# Patient Record
Sex: FEMALE | Race: WHITE | NOT HISPANIC OR LATINO | Employment: FULL TIME | URBAN - METROPOLITAN AREA
[De-identification: names, ages, dates, MRNs, and addresses within clinical notes are randomized per-mention and may not be internally consistent; named-entity substitution may affect disease eponyms.]

---

## 2017-04-12 ENCOUNTER — TELEPHONE (OUTPATIENT)
Dept: ENDOCRINOLOGY | Facility: CLINIC | Age: 47
End: 2017-04-12

## 2017-04-12 DIAGNOSIS — E89.0 POSTOPERATIVE HYPOTHYROIDISM: ICD-10-CM

## 2017-04-12 RX ORDER — LEVOTHYROXINE SODIUM 137 UG/1
137 TABLET ORAL DAILY
Qty: 30 TABLET | Refills: 5 | Status: SHIPPED | OUTPATIENT
Start: 2017-04-12 | End: 2017-10-10 | Stop reason: SDUPTHER

## 2017-04-12 NOTE — TELEPHONE ENCOUNTER
----- Message from Kimberly CARNES MD sent at 4/11/2017  6:19 PM EDT -----  Please call the patient regarding her lab results. Thyroid function is at goal of therapy. Vitamin D is low and she needs replacement (is she taking vit D supplements?) She also needs appointment - 30 min in fall. If she waits till last minute we will not be able to find a spot.

## 2017-04-12 NOTE — TELEPHONE ENCOUNTER
Pt was informed of results and expressed understanding. An appointment has been scheduled for 10/2016

## 2017-09-25 ENCOUNTER — TELEPHONE (OUTPATIENT)
Dept: INTERNAL MEDICINE | Facility: CLINIC | Age: 47
End: 2017-09-25

## 2017-09-25 DIAGNOSIS — C73 PAPILLARY CARCINOMA OF THYROID (HCC): Primary | ICD-10-CM

## 2017-09-25 NOTE — TELEPHONE ENCOUNTER
PATIENT IS CONCERNED ABOUT NOT FEELING GOOD, LEG CRAMPS AND LOSS OF CONCENTRATION. SHE WANTS TO KNOW IF DR. OLIVAS WOULD WANT TO CHECK PATIENTS BLOOD LEVEL TO SEE IF THIS IS WHAT'S CAUSING HER ISSUES. YOU CAN REACH PATIENT BACK [phone]

## 2017-09-26 NOTE — TELEPHONE ENCOUNTER
Lab order was entered into pt's chart, called pt, no answer. Left message requesting for her to return my call.

## 2017-09-26 NOTE — TELEPHONE ENCOUNTER
Spoke with pt and informed her that  wants for her to have some labs completed and that she can come by our office to have these completed.

## 2017-10-09 ENCOUNTER — LAB (OUTPATIENT)
Dept: LAB | Facility: HOSPITAL | Age: 47
End: 2017-10-09

## 2017-10-09 DIAGNOSIS — C73 PAPILLARY CARCINOMA OF THYROID (HCC): ICD-10-CM

## 2017-10-09 LAB
T4 FREE SERPL-MCNC: 1.36 NG/DL (ref 0.89–1.76)
TSH SERPL DL<=0.05 MIU/L-ACNC: 0.53 MIU/ML (ref 0.35–5.35)

## 2017-10-09 PROCEDURE — 84439 ASSAY OF FREE THYROXINE: CPT | Performed by: INTERNAL MEDICINE

## 2017-10-09 PROCEDURE — 84443 ASSAY THYROID STIM HORMONE: CPT | Performed by: INTERNAL MEDICINE

## 2017-10-10 DIAGNOSIS — E89.0 POSTOPERATIVE HYPOTHYROIDISM: ICD-10-CM

## 2017-10-10 RX ORDER — LEVOTHYROXINE SODIUM 137 UG/1
137 TABLET ORAL DAILY
Qty: 30 TABLET | Refills: 5 | Status: SHIPPED | OUTPATIENT
Start: 2017-10-10 | End: 2018-02-15

## 2017-10-10 NOTE — TELEPHONE ENCOUNTER
HAS TO BE NAME BRAND, CALLED FOR REFILL, WANTS TO KNOW IF THERE WILL BE A DOSAGE CHANGE WITH RECENT LAB RESULTS

## 2018-02-13 ENCOUNTER — TRANSCRIBE ORDERS (OUTPATIENT)
Dept: ADMINISTRATIVE | Facility: HOSPITAL | Age: 48
End: 2018-02-13

## 2018-02-13 DIAGNOSIS — Z12.31 VISIT FOR SCREENING MAMMOGRAM: Primary | ICD-10-CM

## 2018-02-14 ENCOUNTER — OFFICE VISIT (OUTPATIENT)
Dept: INTERNAL MEDICINE | Facility: CLINIC | Age: 48
End: 2018-02-14

## 2018-02-14 VITALS
OXYGEN SATURATION: 99 % | SYSTOLIC BLOOD PRESSURE: 124 MMHG | WEIGHT: 167.8 LBS | HEIGHT: 66 IN | BODY MASS INDEX: 26.97 KG/M2 | TEMPERATURE: 98 F | HEART RATE: 74 BPM | DIASTOLIC BLOOD PRESSURE: 82 MMHG

## 2018-02-14 DIAGNOSIS — E53.8 VITAMIN B12 DEFICIENCY: ICD-10-CM

## 2018-02-14 DIAGNOSIS — Z78.0 MENOPAUSE: ICD-10-CM

## 2018-02-14 DIAGNOSIS — Z23 NEED FOR TDAP VACCINATION: ICD-10-CM

## 2018-02-14 DIAGNOSIS — E89.0 POSTOPERATIVE HYPOTHYROIDISM: ICD-10-CM

## 2018-02-14 DIAGNOSIS — E55.9 VITAMIN D DEFICIENCY: ICD-10-CM

## 2018-02-14 DIAGNOSIS — K21.9 GASTROESOPHAGEAL REFLUX DISEASE WITHOUT ESOPHAGITIS: ICD-10-CM

## 2018-02-14 DIAGNOSIS — M25.552 PAIN OF BOTH HIP JOINTS: ICD-10-CM

## 2018-02-14 DIAGNOSIS — M25.551 PAIN OF BOTH HIP JOINTS: ICD-10-CM

## 2018-02-14 DIAGNOSIS — Z00.00 ANNUAL PHYSICAL EXAM: Primary | ICD-10-CM

## 2018-02-14 DIAGNOSIS — R82.90 ABNORMAL URINALYSIS: ICD-10-CM

## 2018-02-14 LAB
BILIRUB BLD-MCNC: NEGATIVE MG/DL
CLARITY, POC: CLEAR
COLOR UR: YELLOW
GLUCOSE UR STRIP-MCNC: NEGATIVE MG/DL
KETONES UR QL: NEGATIVE
LEUKOCYTE EST, POC: ABNORMAL
NITRITE UR-MCNC: NEGATIVE MG/ML
PH UR: 6 [PH] (ref 5–8)
PROT UR STRIP-MCNC: NEGATIVE MG/DL
RBC # UR STRIP: NEGATIVE /UL
SP GR UR: 1.02 (ref 1–1.03)
UROBILINOGEN UR QL: NORMAL

## 2018-02-14 PROCEDURE — 90471 IMMUNIZATION ADMIN: CPT | Performed by: NURSE PRACTITIONER

## 2018-02-14 PROCEDURE — 81003 URINALYSIS AUTO W/O SCOPE: CPT | Performed by: NURSE PRACTITIONER

## 2018-02-14 PROCEDURE — 87086 URINE CULTURE/COLONY COUNT: CPT | Performed by: NURSE PRACTITIONER

## 2018-02-14 PROCEDURE — 99396 PREV VISIT EST AGE 40-64: CPT | Performed by: NURSE PRACTITIONER

## 2018-02-14 PROCEDURE — 90715 TDAP VACCINE 7 YRS/> IM: CPT | Performed by: NURSE PRACTITIONER

## 2018-02-14 NOTE — PROGRESS NOTES
Subjective   Joyce Orona is a 47 y.o. female here to establish care.  Chief Complaint   Patient presents with   • Establish Care   • Annual Exam       History of Present Illness     Joyce is here to establish care and complete her annual physical exam.  She does see Dr. Christensen for gynecology and Dr. Vance for endocrinology.     She does have a history of thyroid cancer in 2007. She underwent a thyroidectomy and I - 131 was administered afterwards.  In July 2008 she had repeat whole body scan and recurrent activity in the thyroid bed was seen, for which patient received additional therapy. Thyroglobulin is undetectable since then.  Her last visit with Dr. Vance was 11/2016. Has an appt with her 3/2/2018.   Her last TSH and free T4 were drawn 10/2017.  TSH was 0.528 and free T4 was 1.36 at that time. Currently compliant with taking levothyroxine 125mg daily. Has some tremor in the  afternoons occasionally, but feels better after a snack.     She would like me to take over monitoring her thyroid.      Having some mild arthralgias in her hips and legs. Feels stiff in the mornings and improves within a few minutes of ambulation or when she gets on her treadmill.    Her Vitamin D was 12.8 in 3/2017.  She is not currently taking vitamin D supplementation.      She has had some past issues with vertigo and has meclizine for this. Sh has not had any problems for about a year.     Menopause- her LMP was 11/2016.  She does have some occasional hot flashes/night sweats. Denies any mood or vaginal symptoms. She does not wish to take any medications for this at this time.     GERD- chronic and well controlled with prevacid 15mg daily. Denies heartburn, regurgitation, early satiety, cough, trouble swallowing, N/V, abdominal pain, and melena.    Health maintenance:    Influenza: 10/2017  Tdap: 2/14/2018  Pap: per GYN (torre)  Mammogram:she has one scheduled for 3/2018  Dexa: 2003  Tobacco use: denies    Diet: meat and  "potatoes  Exercise: daily walking on treadmill      The following portions of the patient's history were reviewed and updated as appropriate: allergies, current medications, past family history, past medical history, past social history, past surgical history and problem list.    Review of Systems   Constitutional: Negative for appetite change, chills, fatigue and fever.   HENT: Negative for congestion, ear pain, rhinorrhea, sinus pressure and sore throat.    Eyes: Negative for itching and visual disturbance.   Respiratory: Negative for cough, chest tightness, shortness of breath and wheezing.    Cardiovascular: Negative for chest pain, palpitations and leg swelling.   Gastrointestinal: Negative for abdominal pain, constipation, diarrhea, nausea and vomiting.   Endocrine: Negative for cold intolerance, heat intolerance, polydipsia, polyphagia and polyuria.   Genitourinary: Negative for difficulty urinating, dysuria and hematuria.        C/O Hot flashes/night sweats     Musculoskeletal: Negative for arthralgias, back pain, joint swelling and myalgias.   Skin: Negative for rash and wound.   Allergic/Immunologic: Negative for environmental allergies and food allergies.   Neurological: Positive for dizziness (none for the last year) and tremors (occasional in afternoons). Negative for headaches.   Hematological: Negative for adenopathy. Does not bruise/bleed easily.   Psychiatric/Behavioral: Negative for sleep disturbance. The patient is not nervous/anxious.      Blood pressure 124/82, pulse 74, temperature 98 °F (36.7 °C), height 166.4 cm (65.5\"), weight 76.1 kg (167 lb 12.8 oz), last menstrual period 11/01/2016, SpO2 99 %, not currently breastfeeding.    Allergies   Allergen Reactions   • Antihistamines, Chlorpheniramine-Type Palpitations     TABS.  Elevated heart rate     • Codeine Rash     Past Medical History:   Diagnosis Date   • GERD (gastroesophageal reflux disease)    • H/O bone density study 01/01/2007   • H/O " mammogram 02/01/2017   • Hx of radiation therapy    • Migraine    • Pap smear for cervical cancer screening 04/01/2017   • Thyroid cancer 05/2007    radioiodine tx      Past Surgical History:   Procedure Laterality Date   • TONSILLECTOMY     • TOTAL THYROIDECTOMY  05/2007    Dr. FALCON   • VAGINAL DELIVERY      x1     Family History   Problem Relation Age of Onset   • Diabetes type I Father    • Kidney failure Father 42   • Heart attack Maternal Grandmother 72   • Arthritis Maternal Grandmother    • Diabetes type I Daughter    • Hypertension Mother    • Arthritis Maternal Grandfather    • Arthritis Paternal Grandmother    • Arthritis Paternal Grandfather    • Heart attack Paternal Grandfather 72   • No Known Problems Brother      Social History     Social History   • Marital status:      Spouse name: N/A   • Number of children: N/A   • Years of education: N/A     Occupational History   • refer coord      Social History Main Topics   • Smoking status: Never Smoker   • Smokeless tobacco: Never Used   • Alcohol use No   • Drug use: No   • Sexual activity: Yes     Partners: Male     Birth control/ protection: None      Comment:      Other Topics Concern   • Not on file     Social History Narrative    Lives with spouse and daughter     Immunization History   Administered Date(s) Administered   • Influenza, Unspecified 10/01/2017   • Tdap 02/14/2018   • Tetanus 01/01/2005       Current Outpatient Prescriptions:   •  lansoprazole (PREVACID 24HR) 15 MG capsule, Take  by mouth every 12 (twelve) hours., Disp: , Rfl:   •  Multiple Vitamins-Minerals (HAIR SKIN NAILS PO), Take  by mouth., Disp: , Rfl:   •  levothyroxine (SYNTHROID) 125 MCG tablet, Take 1 tablet by mouth Daily., Disp: 30 tablet, Rfl: 3    Objective   Physical Exam   Constitutional: She is oriented to person, place, and time. Vital signs are normal. She appears well-developed and well-nourished. No distress.   HENT:   Head: Normocephalic and atraumatic.    Right Ear: Tympanic membrane and external ear normal.   Left Ear: Tympanic membrane and external ear normal.   Nose: Nose normal. Right sinus exhibits no maxillary sinus tenderness and no frontal sinus tenderness. Left sinus exhibits no maxillary sinus tenderness and no frontal sinus tenderness.   Mouth/Throat: Uvula is midline, oropharynx is clear and moist and mucous membranes are normal.   Eyes: Conjunctivae, EOM and lids are normal. Pupils are equal, round, and reactive to light. Right eye exhibits no discharge. Left eye exhibits no discharge. No scleral icterus.   Neck: Normal range of motion. Neck supple. No JVD present. Carotid bruit is not present. No tracheal deviation present. No thyroid mass (S/P thyroidectomy) present.   Cardiovascular: Normal rate, regular rhythm, normal heart sounds and intact distal pulses.  Exam reveals no gallop and no friction rub.    No murmur heard.  Pulmonary/Chest: Effort normal and breath sounds normal. No respiratory distress. She exhibits no tenderness. Right breast exhibits no inverted nipple, no mass, no nipple discharge, no skin change and no tenderness. Left breast exhibits no inverted nipple, no mass, no nipple discharge, no skin change and no tenderness. Breasts are symmetrical.   Abdominal: Soft. Normal appearance and bowel sounds are normal. She exhibits no distension and no mass. There is no hepatosplenomegaly. There is no tenderness. There is no rigidity, no rebound, no guarding and no CVA tenderness. No hernia.   Musculoskeletal: Normal range of motion. She exhibits no edema, tenderness or deformity.   Lymphadenopathy:        Head (right side): No submental, no submandibular, no tonsillar, no preauricular, no posterior auricular and no occipital adenopathy present.        Head (left side): No submental, no submandibular, no tonsillar, no preauricular, no posterior auricular and no occipital adenopathy present.     She has no cervical adenopathy.   Neurological:  She is alert and oriented to person, place, and time. She has normal strength and normal reflexes. She displays normal reflexes. No cranial nerve deficit or sensory deficit. Coordination and gait normal.   Skin: Skin is warm and dry. No rash noted. She is not diaphoretic. No erythema. No pallor.   Psychiatric: She has a normal mood and affect. Her speech is normal and behavior is normal. Thought content normal. She is attentive.   Nursing note and vitals reviewed.      Assessment/Plan   Joyce was seen today for establish care and annual exam.    Diagnoses and all orders for this visit:    Annual physical exam  -     POCT urinalysis dipstick, automated  -     CBC & Differential; Future  -     Comprehensive Metabolic Panel; Future  -     TSH; Future  -     T4, Free; Future  -     Lipid Panel; Future  -     Vitamin D 25 Hydroxy; Future  -     Vitamin B12; Future  -     CBC & Differential  -     Comprehensive Metabolic Panel  -     TSH  -     T4, Free  -     Lipid Panel  -     Vitamin D 25 Hydroxy  -     Vitamin B12  -     CBC Auto Differential    Need for Tdap vaccination  -     Tdap Vaccine Greater Than or Equal To 8yo IM    Abnormal urinalysis  -     Urine Culture - Urine, Urine, Clean Catch    Menopause  -     DEXA Bone Density Axial; Future    Vitamin D deficiency  -     Vitamin D 25 Hydroxy; Future  -     Vitamin D 25 Hydroxy    Vitamin B12 deficiency  -     Vitamin B12; Future  -     Vitamin B12    Postoperative hypothyroidism  -     levothyroxine (SYNTHROID) 125 MCG tablet; Take 1 tablet by mouth Daily.    Gastroesophageal reflux disease without esophagitis    Pain of both hip joints        Outpatient Encounter Prescriptions as of 2/14/2018   Medication Sig Dispense Refill   • lansoprazole (PREVACID 24HR) 15 MG capsule Take  by mouth every 12 (twelve) hours.     • Multiple Vitamins-Minerals (HAIR SKIN NAILS PO) Take  by mouth.     • [DISCONTINUED] levothyroxine (SYNTHROID, LEVOTHROID) 137 MCG tablet Take 1  tablet by mouth Daily. (Patient taking differently: Take 137 mcg by mouth Daily. SUE Brand only) 30 tablet 5   • levothyroxine (SYNTHROID) 125 MCG tablet Take 1 tablet by mouth Daily. 30 tablet 3   • [DISCONTINUED] azithromycin (ZITHROMAX) 250 MG tablet Take 2 tablets the first day, then 1 tablet daily for 4 days. 6 tablet 0     No facility-administered encounter medications on file as of 2/14/2018.      Urine + leuks, culture sent and was normal.   Labs sent and reviewed with pt.   Vit D low, rec vit D 3 4719-0980 units daily  Will decrease her levothyroxine to 125mcg daily as TSH low and free t4 up.   Health maintenance: DEXA ordered, Tdap updated today  Counseling: diet and exercise  Encouraged to stay active  She will follow up with Dr Vance 3/2/18. She will discuss having me follow her thyroid with Dr. Vance.   Return in about 1 year (around 2/14/2019). need repeat thyroid labs in 6-8 weeks due to dose change.    Plan of care discussed with pt. She verbalized understanding and agreement.

## 2018-02-15 LAB
25(OH)D3 SERPL-MCNC: 21.9 NG/ML
ALBUMIN SERPL-MCNC: 4.4 G/DL (ref 3.2–4.8)
ALBUMIN/GLOB SERPL: 1.7 G/DL (ref 1.5–2.5)
ALP SERPL-CCNC: 95 U/L (ref 25–100)
ALT SERPL W P-5'-P-CCNC: 23 U/L (ref 7–40)
ANION GAP SERPL CALCULATED.3IONS-SCNC: 8 MMOL/L (ref 3–11)
ARTICHOKE IGE QN: 99 MG/DL (ref 0–130)
AST SERPL-CCNC: 27 U/L (ref 0–33)
BASOPHILS # BLD AUTO: 0.02 10*3/MM3 (ref 0–0.2)
BASOPHILS NFR BLD AUTO: 0.3 % (ref 0–1)
BILIRUB SERPL-MCNC: 0.6 MG/DL (ref 0.3–1.2)
BUN BLD-MCNC: 15 MG/DL (ref 9–23)
BUN/CREAT SERPL: 18.8 (ref 7–25)
CALCIUM SPEC-SCNC: 9.6 MG/DL (ref 8.7–10.4)
CHLORIDE SERPL-SCNC: 104 MMOL/L (ref 99–109)
CHOLEST SERPL-MCNC: 160 MG/DL (ref 0–200)
CO2 SERPL-SCNC: 28 MMOL/L (ref 20–31)
CREAT BLD-MCNC: 0.8 MG/DL (ref 0.6–1.3)
DEPRECATED RDW RBC AUTO: 43.7 FL (ref 37–54)
EOSINOPHIL # BLD AUTO: 0.18 10*3/MM3 (ref 0–0.3)
EOSINOPHIL NFR BLD AUTO: 3 % (ref 0–3)
ERYTHROCYTE [DISTWIDTH] IN BLOOD BY AUTOMATED COUNT: 12.8 % (ref 11.3–14.5)
GFR SERPL CREATININE-BSD FRML MDRD: 76 ML/MIN/1.73
GLOBULIN UR ELPH-MCNC: 2.6 GM/DL
GLUCOSE BLD-MCNC: 80 MG/DL (ref 70–100)
HCT VFR BLD AUTO: 42 % (ref 34.5–44)
HDLC SERPL-MCNC: 51 MG/DL (ref 40–60)
HGB BLD-MCNC: 13.6 G/DL (ref 11.5–15.5)
IMM GRANULOCYTES # BLD: 0.01 10*3/MM3 (ref 0–0.03)
IMM GRANULOCYTES NFR BLD: 0.2 % (ref 0–0.6)
LYMPHOCYTES # BLD AUTO: 1.17 10*3/MM3 (ref 0.6–4.8)
LYMPHOCYTES NFR BLD AUTO: 19.6 % (ref 24–44)
MCH RBC QN AUTO: 30.2 PG (ref 27–31)
MCHC RBC AUTO-ENTMCNC: 32.4 G/DL (ref 32–36)
MCV RBC AUTO: 93.3 FL (ref 80–99)
MONOCYTES # BLD AUTO: 0.53 10*3/MM3 (ref 0–1)
MONOCYTES NFR BLD AUTO: 8.9 % (ref 0–12)
NEUTROPHILS # BLD AUTO: 4.06 10*3/MM3 (ref 1.5–8.3)
NEUTROPHILS NFR BLD AUTO: 68 % (ref 41–71)
PLATELET # BLD AUTO: 293 10*3/MM3 (ref 150–450)
PMV BLD AUTO: 9.4 FL (ref 6–12)
POTASSIUM BLD-SCNC: 4 MMOL/L (ref 3.5–5.5)
PROT SERPL-MCNC: 7 G/DL (ref 5.7–8.2)
RBC # BLD AUTO: 4.5 10*6/MM3 (ref 3.89–5.14)
SODIUM BLD-SCNC: 140 MMOL/L (ref 132–146)
T4 FREE SERPL-MCNC: 1.77 NG/DL (ref 0.89–1.76)
TRIGL SERPL-MCNC: 75 MG/DL (ref 0–150)
TSH SERPL DL<=0.05 MIU/L-ACNC: 0.05 MIU/ML (ref 0.35–5.35)
VIT B12 BLD-MCNC: 840 PG/ML (ref 211–911)
WBC NRBC COR # BLD: 5.97 10*3/MM3 (ref 3.5–10.8)

## 2018-02-15 PROCEDURE — 85025 COMPLETE CBC W/AUTO DIFF WBC: CPT | Performed by: NURSE PRACTITIONER

## 2018-02-15 PROCEDURE — 80053 COMPREHEN METABOLIC PANEL: CPT | Performed by: NURSE PRACTITIONER

## 2018-02-15 PROCEDURE — 84439 ASSAY OF FREE THYROXINE: CPT | Performed by: NURSE PRACTITIONER

## 2018-02-15 PROCEDURE — 80061 LIPID PANEL: CPT | Performed by: NURSE PRACTITIONER

## 2018-02-15 PROCEDURE — 84443 ASSAY THYROID STIM HORMONE: CPT | Performed by: NURSE PRACTITIONER

## 2018-02-15 PROCEDURE — 82607 VITAMIN B-12: CPT | Performed by: NURSE PRACTITIONER

## 2018-02-15 PROCEDURE — 82306 VITAMIN D 25 HYDROXY: CPT | Performed by: NURSE PRACTITIONER

## 2018-02-15 RX ORDER — LEVOTHYROXINE SODIUM 0.12 MG/1
125 TABLET ORAL DAILY
Qty: 30 TABLET | Refills: 3 | Status: SHIPPED | OUTPATIENT
Start: 2018-02-15 | End: 2018-06-11 | Stop reason: ALTCHOICE

## 2018-02-16 LAB
BACTERIA SPEC AEROBE CULT: NORMAL
BACTERIA SPEC AEROBE CULT: NORMAL

## 2018-02-17 PROBLEM — E55.9 VITAMIN D DEFICIENCY: Status: ACTIVE | Noted: 2018-02-17

## 2018-02-17 PROBLEM — Z78.0 MENOPAUSE: Status: ACTIVE | Noted: 2018-02-17

## 2018-03-02 ENCOUNTER — OFFICE VISIT (OUTPATIENT)
Dept: ENDOCRINOLOGY | Facility: CLINIC | Age: 48
End: 2018-03-02

## 2018-03-02 VITALS
BODY MASS INDEX: 27.26 KG/M2 | OXYGEN SATURATION: 98 % | DIASTOLIC BLOOD PRESSURE: 74 MMHG | HEIGHT: 66 IN | SYSTOLIC BLOOD PRESSURE: 130 MMHG | WEIGHT: 169.6 LBS | HEART RATE: 84 BPM

## 2018-03-02 DIAGNOSIS — E89.0 POSTOPERATIVE HYPOTHYROIDISM: ICD-10-CM

## 2018-03-02 DIAGNOSIS — C73 PAPILLARY CARCINOMA OF THYROID (HCC): Primary | ICD-10-CM

## 2018-03-02 PROCEDURE — 99213 OFFICE O/P EST LOW 20 MIN: CPT | Performed by: INTERNAL MEDICINE

## 2018-03-02 RX ORDER — MELATONIN
3000 DAILY
COMMUNITY
End: 2019-02-21

## 2018-03-02 NOTE — PROGRESS NOTES
"Chief complaint  Thyroid Cancer (F/u for thyroid cancer, pt stated she has been feeling well overall)    Subjective   Joyce Orona is a 47 y.o. female is here today for follow-up.  Follow-up for thyroid cancer.   S/p thyroidectomy 2007 and I - 131 was administered afterwards. She had lab work to follow the cancer and the work -up was negative.   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 T1 NxMx.  In July 2007 patient received 100.4 millicuries of radioactive iodine and uptake in the thyroid bed was seen on posttreatment scan. In July 2008 patient had repeat whole body scan and recurrent activity in the thyroid bed was seen, for which patient received additional therapy.  Thyroglobulin is undetectable since then and she remained on suppression therapy  Takes Synthroid 137 mcg and reduced the dose to 125 mcg 2 weeks ago (TSh was 0.04 and free T4 1.77) .     Vit D deficency was dx on labs and she was given supplements    C/o hot flushes and night sweats      Medications    Current Outpatient Prescriptions:   •  cholecalciferol (VITAMIN D3) 1000 units tablet, Take 3,000 Units by mouth Daily., Disp: , Rfl:   •  lansoprazole (PREVACID 24HR) 15 MG capsule, Take  by mouth every 12 (twelve) hours., Disp: , Rfl:   •  levothyroxine (SYNTHROID) 125 MCG tablet, Take 1 tablet by mouth Daily., Disp: 30 tablet, Rfl: 3  •  Multiple Vitamins-Minerals (HAIR SKIN NAILS PO), Take  by mouth., Disp: , Rfl:     PMH  The following portions of the patient's history were reviewed and updated as appropriate: allergies, current medications, past family history, past medical history, past social history, past surgical history and problem list.    Review of systems  Review of Systems   All other systems reviewed and are negative.      Physical exam  Objective   Blood pressure 130/74, pulse 84, height 166.4 cm (65.5\"), weight 76.9 kg (169 lb 9.6 oz), SpO2 98 " %, not currently breastfeeding.   Physical Exam   Constitutional: She is oriented to person, place, and time. She appears well-developed and well-nourished.   HENT:   Head: Normocephalic and atraumatic.   Eyes: Conjunctivae are normal.   Neck: No thyromegaly present.   The neck is supple and symmetric   Cardiovascular: Normal rate, regular rhythm and normal heart sounds.    Pulmonary/Chest: Effort normal and breath sounds normal.   Musculoskeletal: She exhibits no edema.   Lymphadenopathy:     She has no cervical adenopathy.   Neurological: She is alert and oriented to person, place, and time. She has normal reflexes.   Skin: Skin is warm and dry. No rash noted.   Psychiatric: She has a normal mood and affect. Thought content normal.   Vitals reviewed.        LABS AND IMAGING  Reviewed labs from recent visit with PCP        Assessment  Assessment/Plan   Problem List Items Addressed This Visit        Endocrine    Hypothyroidism    Papillary carcinoma of thyroid - Primary          Plan   Cont 125 mcg  Synthroid with the goal of TSH suppression. Continue with TG evaluation and repeat labs in 6-8 weeks.Labs before the next visit.   Agree with the dose reduction, most likely reduced requirements in the dose are associated with menopause.   Follow-up in 1 year.

## 2018-03-13 ENCOUNTER — APPOINTMENT (OUTPATIENT)
Dept: MAMMOGRAPHY | Facility: HOSPITAL | Age: 48
End: 2018-03-13
Attending: OBSTETRICS & GYNECOLOGY

## 2018-04-11 ENCOUNTER — APPOINTMENT (OUTPATIENT)
Dept: BONE DENSITY | Facility: HOSPITAL | Age: 48
End: 2018-04-11

## 2018-04-16 ENCOUNTER — HOSPITAL ENCOUNTER (OUTPATIENT)
Dept: MAMMOGRAPHY | Facility: HOSPITAL | Age: 48
Discharge: HOME | End: 2018-04-16
Attending: OBSTETRICS & GYNECOLOGY | Admitting: OBSTETRICS & GYNECOLOGY

## 2018-04-16 DIAGNOSIS — Z12.31 VISIT FOR SCREENING MAMMOGRAM: ICD-10-CM

## 2018-04-16 PROCEDURE — 77067 SCR MAMMO BI INCL CAD: CPT | Performed by: RADIOLOGY

## 2018-04-16 PROCEDURE — 77067 SCR MAMMO BI INCL CAD: CPT

## 2018-04-16 PROCEDURE — 77063 BREAST TOMOSYNTHESIS BI: CPT

## 2018-04-16 PROCEDURE — 77063 BREAST TOMOSYNTHESIS BI: CPT | Performed by: RADIOLOGY

## 2018-06-08 ENCOUNTER — LAB (OUTPATIENT)
Dept: INTERNAL MEDICINE | Facility: CLINIC | Age: 48
End: 2018-06-08

## 2018-06-08 DIAGNOSIS — C73 PAPILLARY CARCINOMA OF THYROID (HCC): ICD-10-CM

## 2018-06-08 LAB
T4 FREE SERPL-MCNC: 1.33 NG/DL (ref 0.89–1.76)
TSH SERPL DL<=0.05 MIU/L-ACNC: 0.17 MIU/ML (ref 0.35–5.35)

## 2018-06-08 PROCEDURE — 86800 THYROGLOBULIN ANTIBODY: CPT | Performed by: INTERNAL MEDICINE

## 2018-06-08 PROCEDURE — 84443 ASSAY THYROID STIM HORMONE: CPT | Performed by: INTERNAL MEDICINE

## 2018-06-08 PROCEDURE — 84439 ASSAY OF FREE THYROXINE: CPT | Performed by: INTERNAL MEDICINE

## 2018-06-11 ENCOUNTER — TELEPHONE (OUTPATIENT)
Dept: ENDOCRINOLOGY | Facility: CLINIC | Age: 48
End: 2018-06-11

## 2018-06-11 LAB — REF LAB TEST RESULTS: NORMAL

## 2018-06-11 RX ORDER — LEVOTHYROXINE SODIUM 125 MCG
125 TABLET ORAL DAILY
Qty: 30 TABLET | Refills: 5 | Status: SHIPPED | OUTPATIENT
Start: 2018-06-11 | End: 2018-12-06 | Stop reason: SDUPTHER

## 2018-06-11 NOTE — TELEPHONE ENCOUNTER
THyroid function is at goal of therapy, continue same dose. Send rx. Thyroglobulin is 0.1 with negative antibodies.

## 2018-06-11 NOTE — TELEPHONE ENCOUNTER
Pt said she is getting ready to leave out of town and is needing a refill on her levothyroxine. She had labs completed on Friday but did not see where you have reviewed her results yet to see if her her dosage needed to be changed?

## 2018-06-23 RX ORDER — LANSOPRAZOLE 30 MG/1
CAPSULE, DELAYED RELEASE ORAL
Qty: 90 CAPSULE | Refills: 2 | OUTPATIENT
Start: 2018-06-23

## 2018-06-27 ENCOUNTER — TELEPHONE (OUTPATIENT)
Dept: INTERNAL MEDICINE | Facility: CLINIC | Age: 48
End: 2018-06-27

## 2018-06-27 DIAGNOSIS — K21.9 GASTROESOPHAGEAL REFLUX DISEASE WITHOUT ESOPHAGITIS: Primary | ICD-10-CM

## 2018-06-27 RX ORDER — LANSOPRAZOLE 15 MG/1
15 CAPSULE, DELAYED RELEASE ORAL EVERY 12 HOURS
Qty: 90 CAPSULE | Refills: 1 | Status: SHIPPED | OUTPATIENT
Start: 2018-06-27 | End: 2018-09-22 | Stop reason: SDUPTHER

## 2018-08-06 ENCOUNTER — LAB (OUTPATIENT)
Dept: INTERNAL MEDICINE | Facility: CLINIC | Age: 48
End: 2018-08-06

## 2018-08-06 DIAGNOSIS — N95.0 POSTMENOPAUSAL BLEEDING: Primary | ICD-10-CM

## 2018-08-06 DIAGNOSIS — N95.0 POSTMENOPAUSAL BLEEDING: ICD-10-CM

## 2018-08-06 DIAGNOSIS — E89.0 POSTOPERATIVE HYPOTHYROIDISM: ICD-10-CM

## 2018-08-06 DIAGNOSIS — Z85.850 HISTORY OF THYROID CANCER: ICD-10-CM

## 2018-08-06 LAB
ALBUMIN SERPL-MCNC: 4.31 G/DL (ref 3.2–4.8)
ALBUMIN/GLOB SERPL: 1.5 G/DL (ref 1.5–2.5)
ALP SERPL-CCNC: 130 U/L (ref 25–100)
ALT SERPL W P-5'-P-CCNC: 16 U/L (ref 7–40)
ANION GAP SERPL CALCULATED.3IONS-SCNC: 9 MMOL/L (ref 3–11)
AST SERPL-CCNC: 22 U/L (ref 0–33)
BILIRUB SERPL-MCNC: 0.3 MG/DL (ref 0.3–1.2)
BUN BLD-MCNC: 16 MG/DL (ref 9–23)
BUN/CREAT SERPL: 21.3 (ref 7–25)
CALCIUM SPEC-SCNC: 9.3 MG/DL (ref 8.7–10.4)
CHLORIDE SERPL-SCNC: 108 MMOL/L (ref 99–109)
CO2 SERPL-SCNC: 26 MMOL/L (ref 20–31)
CREAT BLD-MCNC: 0.75 MG/DL (ref 0.6–1.3)
DEPRECATED RDW RBC AUTO: 47.2 FL (ref 37–54)
ERYTHROCYTE [DISTWIDTH] IN BLOOD BY AUTOMATED COUNT: 13.7 % (ref 11.3–14.5)
GFR SERPL CREATININE-BSD FRML MDRD: 82 ML/MIN/1.73
GLOBULIN UR ELPH-MCNC: 2.8 GM/DL
GLUCOSE BLD-MCNC: 85 MG/DL (ref 70–100)
HCT VFR BLD AUTO: 45.8 % (ref 34.5–44)
HGB BLD-MCNC: 14.6 G/DL (ref 11.5–15.5)
MCH RBC QN AUTO: 30 PG (ref 27–31)
MCHC RBC AUTO-ENTMCNC: 31.9 G/DL (ref 32–36)
MCV RBC AUTO: 94 FL (ref 80–99)
PLATELET # BLD AUTO: 340 10*3/MM3 (ref 150–450)
PMV BLD AUTO: 9.6 FL (ref 6–12)
POTASSIUM BLD-SCNC: 4 MMOL/L (ref 3.5–5.5)
PROT SERPL-MCNC: 7.1 G/DL (ref 5.7–8.2)
RBC # BLD AUTO: 4.87 10*6/MM3 (ref 3.89–5.14)
SODIUM BLD-SCNC: 143 MMOL/L (ref 132–146)
TSH SERPL DL<=0.05 MIU/L-ACNC: 0.2 MIU/ML (ref 0.35–5.35)
WBC NRBC COR # BLD: 6.47 10*3/MM3 (ref 3.5–10.8)

## 2018-08-06 PROCEDURE — 85027 COMPLETE CBC AUTOMATED: CPT | Performed by: NURSE PRACTITIONER

## 2018-08-06 PROCEDURE — 84439 ASSAY OF FREE THYROXINE: CPT | Performed by: NURSE PRACTITIONER

## 2018-08-06 PROCEDURE — 84443 ASSAY THYROID STIM HORMONE: CPT | Performed by: NURSE PRACTITIONER

## 2018-08-06 PROCEDURE — 80053 COMPREHEN METABOLIC PANEL: CPT | Performed by: NURSE PRACTITIONER

## 2018-08-06 NOTE — PROGRESS NOTES
Joyce notified me of vaginal bleeding similar to a heavy menses. She has not had a menses in almost 2 years. Will proceed with labs and US. May need to see GYN.

## 2018-08-07 LAB — T4 FREE SERPL-MCNC: 1.4 NG/DL (ref 0.89–1.76)

## 2018-08-16 DIAGNOSIS — Z78.0 MENOPAUSE: Primary | ICD-10-CM

## 2018-08-28 ENCOUNTER — OFFICE VISIT (OUTPATIENT)
Dept: INTERNAL MEDICINE | Facility: CLINIC | Age: 48
End: 2018-08-28

## 2018-08-28 VITALS
DIASTOLIC BLOOD PRESSURE: 64 MMHG | TEMPERATURE: 99.3 F | OXYGEN SATURATION: 98 % | HEART RATE: 84 BPM | SYSTOLIC BLOOD PRESSURE: 118 MMHG | HEIGHT: 66 IN

## 2018-08-28 DIAGNOSIS — R06.2 WHEEZING: Primary | ICD-10-CM

## 2018-08-28 DIAGNOSIS — J01.10 ACUTE FRONTAL SINUSITIS, RECURRENCE NOT SPECIFIED: ICD-10-CM

## 2018-08-28 DIAGNOSIS — J40 BRONCHITIS: ICD-10-CM

## 2018-08-28 PROCEDURE — 99214 OFFICE O/P EST MOD 30 MIN: CPT | Performed by: NURSE PRACTITIONER

## 2018-08-28 PROCEDURE — 94640 AIRWAY INHALATION TREATMENT: CPT | Performed by: NURSE PRACTITIONER

## 2018-08-28 RX ORDER — ALBUTEROL SULFATE 2.5 MG/3ML
2.5 SOLUTION RESPIRATORY (INHALATION) ONCE
Status: COMPLETED | OUTPATIENT
Start: 2018-08-28 | End: 2018-08-28

## 2018-08-28 RX ORDER — ALBUTEROL SULFATE 90 UG/1
2 AEROSOL, METERED RESPIRATORY (INHALATION) EVERY 4 HOURS PRN
Qty: 1 INHALER | Refills: 0 | Status: SHIPPED | OUTPATIENT
Start: 2018-08-28 | End: 2020-06-03 | Stop reason: SDUPTHER

## 2018-08-28 RX ORDER — DOXYCYCLINE HYCLATE 100 MG/1
100 CAPSULE ORAL 2 TIMES DAILY
Qty: 10 CAPSULE | Refills: 0 | Status: SHIPPED | OUTPATIENT
Start: 2018-08-28 | End: 2019-02-21

## 2018-08-28 RX ADMIN — ALBUTEROL SULFATE 2.5 MG: 2.5 SOLUTION RESPIRATORY (INHALATION) at 14:50

## 2018-08-28 NOTE — PROGRESS NOTES
Subjective   Joyce Orona is a 48 y.o. female.   Chief Complaint   Patient presents with   • Wheezing     sx x 2 days   • Facial Pain   • nasal drainage     colored mucus   • Headache       Wheezing    This is a new problem. The current episode started in the past 7 days. Associated symptoms include coryza, coughing, ear pain, headaches, rhinorrhea, shortness of breath and sputum production. Pertinent negatives include no abdominal pain, chest pain, chills, diarrhea, fever, hemoptysis, neck pain, rash, sore throat, swollen glands or vomiting. Nothing aggravates the symptoms. Treatments tried: claritin. The treatment provided no relief. There is no history of asthma, bronchiolitis, CAD, chronic lung disease, COPD, DVT, heart failure, past MI, PE or pneumonia.   Headache    This is a new problem. The current episode started in the past 7 days. The problem occurs daily. The problem has been unchanged. The pain is located in the bilateral and frontal region. The pain quality is similar to prior headaches. The quality of the pain is described as aching (pressure). The pain is mild. Associated symptoms include coughing, drainage, ear pain, rhinorrhea and sinus pressure. Pertinent negatives include no abdominal pain, abnormal behavior, anorexia, blurred vision, dizziness, eye pain, eye redness, facial sweating, fever, loss of balance, nausea, neck pain, numbness, phonophobia, scalp tenderness, seizures, sore throat, swollen glands, tingling or vomiting. The symptoms are aggravated by coughing. Treatments tried: claritin. The treatment provided mild relief. There is no history of cancer, cluster headaches, hypertension, immunosuppression, migraine headaches, migraines in the family, obesity, pseudotumor cerebri, recent head traumas, sinus disease or TMJ.   URI    This is a new problem. The current episode started in the past 7 days. The problem has been gradually worsening. There has been no fever. Associated  symptoms include congestion, coughing, ear pain, headaches, rhinorrhea, sinus pain and wheezing. Pertinent negatives include no abdominal pain, chest pain, diarrhea, dysuria, joint pain, joint swelling, nausea, neck pain, plugged ear sensation, rash, sneezing, sore throat, swollen glands or vomiting. She has tried antihistamine for the symptoms. The treatment provided mild relief.        The following portions of the patient's history were reviewed and updated as appropriate: allergies, current medications, past family history, past medical history, past social history, past surgical history and problem list.    Review of Systems   Constitutional: Positive for fatigue. Negative for appetite change, chills, diaphoresis and fever.   HENT: Positive for congestion, ear pain, postnasal drip, rhinorrhea, sinus pain and sinus pressure. Negative for sneezing and sore throat.    Eyes: Negative for blurred vision, pain, discharge, redness, itching and visual disturbance.   Respiratory: Positive for cough, sputum production, shortness of breath and wheezing. Negative for apnea, hemoptysis, choking, chest tightness and stridor.    Cardiovascular: Negative for chest pain and leg swelling.   Gastrointestinal: Negative for abdominal pain, anorexia, diarrhea, nausea and vomiting.   Genitourinary: Negative for dysuria.   Musculoskeletal: Negative for joint pain and neck pain.   Skin: Negative for rash.   Neurological: Positive for headaches. Negative for dizziness, tingling, seizures, numbness and loss of balance.       Objective   Physical Exam   Constitutional: She is oriented to person, place, and time. She appears well-developed and well-nourished. She does not have a sickly appearance. She appears ill. No distress.   HENT:   Head: Normocephalic and atraumatic.   Right Ear: Tympanic membrane and external ear normal.   Left Ear: Tympanic membrane and external ear normal.   Nose: Mucosal edema and rhinorrhea present. Right sinus  exhibits frontal sinus tenderness. Right sinus exhibits no maxillary sinus tenderness. Left sinus exhibits frontal sinus tenderness. Left sinus exhibits no maxillary sinus tenderness.   Mouth/Throat: Uvula is midline, oropharynx is clear and moist and mucous membranes are normal. No oropharyngeal exudate.   Eyes: Conjunctivae are normal. Right eye exhibits no discharge. Left eye exhibits no discharge.   Neck: Normal range of motion. Neck supple. No JVD present.   Cardiovascular: Normal rate, regular rhythm and normal heart sounds.    No murmur heard.  Pulmonary/Chest: Effort normal. No stridor. No respiratory distress. She has wheezes (cleared after neb in office).   Abdominal: Soft.   Musculoskeletal: She exhibits no edema.   Lymphadenopathy:     She has no cervical adenopathy.   Neurological: She is alert and oriented to person, place, and time.   Skin: Skin is warm and dry. Capillary refill takes less than 2 seconds. She is not diaphoretic.   Psychiatric: She has a normal mood and affect. Her behavior is normal.   Nursing note and vitals reviewed.      Assessment/Plan   Joyce was seen today for wheezing, facial pain, nasal drainage and headache.    Diagnoses and all orders for this visit:    Wheezing  -     albuterol (PROVENTIL) nebulizer solution 0.083% 2.5 mg/3mL; Take 2.5 mg by nebulization 1 (One) Time.  -     albuterol (PROVENTIL HFA;VENTOLIN HFA) 108 (90 Base) MCG/ACT inhaler; Inhale 2 puffs Every 4 (Four) Hours As Needed for Wheezing.    Acute frontal sinusitis, recurrence not specified  -     doxycycline (VIBRAMYCIN) 100 MG capsule; Take 1 capsule by mouth 2 (Two) Times a Day.    Bronchitis  -     albuterol (PROVENTIL HFA;VENTOLIN HFA) 108 (90 Base) MCG/ACT inhaler; Inhale 2 puffs Every 4 (Four) Hours As Needed for Wheezing.        Neb in office- tolerated well, expresses ability to take a deeper breath post neb.   Doxycycline as directed. AE discussed  Increase fluids  Saline nasal rinses.   Mucinex  with increased fluids  Albuterol inhaler PRN  May consider adding steroid if no improvement in 24 hours - she will let me know how she is feeling.   Return if symptoms worsen or fail to improve.  Plan of care discussed with pt. They verbalized understanding and agreement.

## 2018-09-13 ENCOUNTER — CLINICAL SUPPORT (OUTPATIENT)
Dept: INTERNAL MEDICINE | Facility: CLINIC | Age: 48
End: 2018-09-13

## 2018-09-13 DIAGNOSIS — Z23 NEED FOR PROPHYLACTIC VACCINATION AGAINST HEPATITIS A: Primary | ICD-10-CM

## 2018-09-13 PROCEDURE — 90632 HEPA VACCINE ADULT IM: CPT | Performed by: NURSE PRACTITIONER

## 2018-09-13 PROCEDURE — 90471 IMMUNIZATION ADMIN: CPT | Performed by: NURSE PRACTITIONER

## 2018-09-17 ENCOUNTER — HOSPITAL ENCOUNTER (OUTPATIENT)
Dept: BONE DENSITY | Facility: HOSPITAL | Age: 48
Discharge: HOME | End: 2018-09-17
Admitting: NURSE PRACTITIONER

## 2018-09-17 DIAGNOSIS — Z78.0 MENOPAUSE: ICD-10-CM

## 2018-09-17 PROCEDURE — 77080 DXA BONE DENSITY AXIAL: CPT

## 2018-09-24 RX ORDER — LANSOPRAZOLE 15 MG/1
CAPSULE, DELAYED RELEASE ORAL
Qty: 60 CAPSULE | Refills: 0 | Status: SHIPPED | OUTPATIENT
Start: 2018-09-24 | End: 2018-10-21 | Stop reason: SDUPTHER

## 2018-10-22 RX ORDER — LANSOPRAZOLE 15 MG/1
CAPSULE, DELAYED RELEASE ORAL
Qty: 60 CAPSULE | Refills: 5 | Status: SHIPPED | OUTPATIENT
Start: 2018-10-22 | End: 2019-02-21

## 2018-11-14 ENCOUNTER — TELEPHONE (OUTPATIENT)
Dept: INTERNAL MEDICINE | Facility: CLINIC | Age: 48
End: 2018-11-14

## 2018-11-14 DIAGNOSIS — E55.9 VITAMIN D DEFICIENCY: Primary | ICD-10-CM

## 2018-11-14 RX ORDER — LANSOPRAZOLE 30 MG/1
30 CAPSULE, DELAYED RELEASE ORAL DAILY
Qty: 90 CAPSULE | Refills: 1 | Status: SHIPPED | OUTPATIENT
Start: 2018-11-14 | End: 2019-05-16 | Stop reason: SDUPTHER

## 2018-11-14 NOTE — TELEPHONE ENCOUNTER
May I please have refills called in on my Prevacid and I take 2 of the 15mg can we please call in 30 mg so I am not taking two pills. Thank you

## 2018-12-06 RX ORDER — LEVOTHYROXINE SODIUM 125 MCG
TABLET ORAL
Qty: 30 TABLET | Refills: 4 | Status: SHIPPED | OUTPATIENT
Start: 2018-12-06 | End: 2019-07-02 | Stop reason: SDUPTHER

## 2018-12-10 ENCOUNTER — LAB (OUTPATIENT)
Dept: INTERNAL MEDICINE | Facility: CLINIC | Age: 48
End: 2018-12-10

## 2018-12-10 DIAGNOSIS — E55.9 VITAMIN D DEFICIENCY: ICD-10-CM

## 2018-12-10 DIAGNOSIS — E89.0 POSTOPERATIVE HYPOTHYROIDISM: Primary | ICD-10-CM

## 2018-12-10 DIAGNOSIS — E89.0 POSTOPERATIVE HYPOTHYROIDISM: ICD-10-CM

## 2018-12-10 LAB
25(OH)D3 SERPL-MCNC: 43.5 NG/ML
T4 FREE SERPL-MCNC: 1.74 NG/DL (ref 0.89–1.76)
TSH SERPL DL<=0.05 MIU/L-ACNC: 0.1 MIU/ML (ref 0.35–5.35)

## 2018-12-10 PROCEDURE — 84443 ASSAY THYROID STIM HORMONE: CPT | Performed by: NURSE PRACTITIONER

## 2018-12-10 PROCEDURE — 84439 ASSAY OF FREE THYROXINE: CPT | Performed by: NURSE PRACTITIONER

## 2018-12-10 PROCEDURE — 82306 VITAMIN D 25 HYDROXY: CPT | Performed by: NURSE PRACTITIONER

## 2018-12-22 ENCOUNTER — TELEPHONE (OUTPATIENT)
Dept: INTERNAL MEDICINE | Facility: CLINIC | Age: 48
End: 2018-12-22

## 2018-12-22 RX ORDER — AZITHROMYCIN 250 MG/1
TABLET, FILM COATED ORAL
Qty: 6 TABLET | Refills: 0 | Status: SHIPPED | OUTPATIENT
Start: 2018-12-22 | End: 2019-02-21

## 2018-12-22 NOTE — TELEPHONE ENCOUNTER
Requests Zpak, for sinus infection. Has used it before with good results.  Sent to [address]

## 2019-02-21 ENCOUNTER — OFFICE VISIT (OUTPATIENT)
Dept: INTERNAL MEDICINE | Facility: CLINIC | Age: 49
End: 2019-02-21

## 2019-02-21 VITALS
WEIGHT: 163 LBS | TEMPERATURE: 98.8 F | HEART RATE: 87 BPM | SYSTOLIC BLOOD PRESSURE: 118 MMHG | DIASTOLIC BLOOD PRESSURE: 70 MMHG | BODY MASS INDEX: 26.2 KG/M2 | HEIGHT: 66 IN | OXYGEN SATURATION: 98 % | RESPIRATION RATE: 16 BRPM

## 2019-02-21 DIAGNOSIS — R68.89 FLU-LIKE SYMPTOMS: Primary | ICD-10-CM

## 2019-02-21 DIAGNOSIS — H65.111 ACUTE MUCOID OTITIS MEDIA OF RIGHT EAR: ICD-10-CM

## 2019-02-21 LAB
EXPIRATION DATE: NORMAL
FLUAV AG NPH QL: NEGATIVE
FLUBV AG NPH QL: NEGATIVE
INTERNAL CONTROL: NORMAL
Lab: NORMAL

## 2019-02-21 PROCEDURE — 87804 INFLUENZA ASSAY W/OPTIC: CPT | Performed by: NURSE PRACTITIONER

## 2019-02-21 PROCEDURE — 99213 OFFICE O/P EST LOW 20 MIN: CPT | Performed by: NURSE PRACTITIONER

## 2019-02-21 RX ORDER — AMOXICILLIN 875 MG/1
875 TABLET, COATED ORAL 2 TIMES DAILY
Qty: 20 TABLET | Refills: 0 | Status: SHIPPED | OUTPATIENT
Start: 2019-02-21 | End: 2019-03-13

## 2019-02-21 NOTE — PROGRESS NOTES
Subjective   Joyce Orona is a 48 y.o. female.     Chief Complaint   Patient presents with   • Earache     nasal congestion and drainage, earache, body aches, chills last night, fatigue   • Generalized Body Aches   • Nasal Congestion       URI    This is a new problem. The current episode started in the past 7 days. The problem has been gradually worsening. There has been no fever. Associated symptoms include congestion (nasal), ear pain, headaches and rhinorrhea. Pertinent negatives include no abdominal pain, chest pain, coughing, diarrhea, dysuria, joint pain, joint swelling, nausea, neck pain, plugged ear sensation, rash, sinus pain, sneezing, sore throat, swollen glands, vomiting or wheezing. Treatments tried: claritin, stahist. flonase. The treatment provided no relief.   Earache    There is pain in both ears. This is a new problem. The current episode started in the past 7 days. The problem occurs constantly. The problem has been unchanged. There has been no fever. The pain is moderate. Associated symptoms include headaches and rhinorrhea. Pertinent negatives include no abdominal pain, coughing, diarrhea, ear discharge, hearing loss, neck pain, rash, sore throat or vomiting. Treatments tried: stahist.          Review of Systems   Constitutional: Positive for chills and fatigue. Negative for fever.   HENT: Positive for congestion (nasal), ear pain and rhinorrhea. Negative for ear discharge, hearing loss, sinus pain, sneezing and sore throat.    Respiratory: Negative for cough and wheezing.    Cardiovascular: Negative for chest pain.   Gastrointestinal: Negative for abdominal pain, diarrhea, nausea and vomiting.   Genitourinary: Negative for dysuria.   Musculoskeletal: Positive for myalgias. Negative for joint pain and neck pain.   Skin: Negative for rash.   Neurological: Positive for headaches.   Psychiatric/Behavioral: Positive for sleep disturbance.         Objective   Physical Exam   Constitutional:  She appears well-developed and well-nourished.  Non-toxic appearance. She appears ill. No distress.   HENT:   Head: Normocephalic and atraumatic.   Right Ear: Hearing, external ear and ear canal normal. No swelling. Tympanic membrane is injected, scarred, erythematous and bulging. Tympanic membrane mobility is abnormal. A middle ear effusion is present.   Left Ear: Hearing, external ear and ear canal normal. No swelling. Tympanic membrane is not injected and not erythematous. Tympanic membrane mobility is normal. A middle ear effusion is present.   Nose: Mucosal edema and rhinorrhea present. Right sinus exhibits no maxillary sinus tenderness and no frontal sinus tenderness. Left sinus exhibits no maxillary sinus tenderness and no frontal sinus tenderness.   Mouth/Throat: Uvula is midline, oropharynx is clear and moist and mucous membranes are normal. No oropharyngeal exudate, posterior oropharyngeal edema or posterior oropharyngeal erythema. No tonsillar exudate.   Eyes: Conjunctivae are normal. Pupils are equal, round, and reactive to light. Right eye exhibits no discharge. Left eye exhibits no discharge.   Neck: Normal range of motion. Neck supple.   Cardiovascular: Normal rate, regular rhythm and normal heart sounds.   Pulmonary/Chest: Effort normal and breath sounds normal. No respiratory distress.   Lymphadenopathy:     She has no cervical adenopathy.   Skin: Skin is warm and dry. No rash noted. She is not diaphoretic.   Nursing note and vitals reviewed.      Vitals:    02/21/19 1353   BP: 118/70   Pulse: 87   Resp: 16   Temp: 98.8 °F (37.1 °C)   SpO2: 98%         Assessment/Plan   Joyce was seen today for earache, generalized body aches and nasal congestion.    Diagnoses and all orders for this visit:    Flu-like symptoms  -     POCT Influenza A/B    Acute mucoid otitis media of right ear  -     amoxicillin (AMOXIL) 875 MG tablet; Take 1 tablet by mouth 2 (Two) Times a Day.         Flu  negative.  Amoxicillin as directed.  Increase rest and fluids.  May continue Claritin and Flonase.  Tylenol or Motrin over-the-counter as needed pain or fever.  Return if symptoms worsen or fail to improve.  Plan of care discussed with pt. They verbalized understanding and agreement.

## 2019-03-13 ENCOUNTER — OFFICE VISIT (OUTPATIENT)
Dept: INTERNAL MEDICINE | Facility: CLINIC | Age: 49
End: 2019-03-13

## 2019-03-13 VITALS
HEIGHT: 66 IN | SYSTOLIC BLOOD PRESSURE: 114 MMHG | HEART RATE: 81 BPM | DIASTOLIC BLOOD PRESSURE: 70 MMHG | RESPIRATION RATE: 16 BRPM | OXYGEN SATURATION: 98 % | BODY MASS INDEX: 26.44 KG/M2 | WEIGHT: 164.5 LBS | TEMPERATURE: 98.3 F

## 2019-03-13 DIAGNOSIS — C73 PAPILLARY CARCINOMA OF THYROID (HCC): ICD-10-CM

## 2019-03-13 DIAGNOSIS — E53.8 B12 DEFICIENCY: ICD-10-CM

## 2019-03-13 DIAGNOSIS — Z13.1 DIABETES MELLITUS SCREENING: ICD-10-CM

## 2019-03-13 DIAGNOSIS — K21.9 GASTROESOPHAGEAL REFLUX DISEASE WITHOUT ESOPHAGITIS: ICD-10-CM

## 2019-03-13 DIAGNOSIS — E89.0 POSTOPERATIVE HYPOTHYROIDISM: ICD-10-CM

## 2019-03-13 DIAGNOSIS — Z12.11 COLON CANCER SCREENING: ICD-10-CM

## 2019-03-13 DIAGNOSIS — Z00.00 ANNUAL PHYSICAL EXAM: Primary | ICD-10-CM

## 2019-03-13 DIAGNOSIS — R07.89 CHEST TIGHTNESS: ICD-10-CM

## 2019-03-13 DIAGNOSIS — Z13.220 LIPID SCREENING: ICD-10-CM

## 2019-03-13 DIAGNOSIS — R42 DIZZINESS: ICD-10-CM

## 2019-03-13 DIAGNOSIS — Z78.0 MENOPAUSE: ICD-10-CM

## 2019-03-13 DIAGNOSIS — E55.9 VITAMIN D DEFICIENCY: ICD-10-CM

## 2019-03-13 DIAGNOSIS — Z23 NEED FOR VACCINATION: ICD-10-CM

## 2019-03-13 LAB
25(OH)D3 SERPL-MCNC: 29.8 NG/ML
ALBUMIN SERPL-MCNC: 4.55 G/DL (ref 3.2–4.8)
ALBUMIN/GLOB SERPL: 1.9 G/DL (ref 1.5–2.5)
ALP SERPL-CCNC: 133 U/L (ref 25–100)
ALT SERPL W P-5'-P-CCNC: 23 U/L (ref 7–40)
ANION GAP SERPL CALCULATED.3IONS-SCNC: 10 MMOL/L (ref 3–11)
ARTICHOKE IGE QN: 102 MG/DL (ref 0–130)
AST SERPL-CCNC: 25 U/L (ref 0–33)
BILIRUB BLD-MCNC: NEGATIVE MG/DL
BILIRUB SERPL-MCNC: 0.5 MG/DL (ref 0.3–1.2)
BUN BLD-MCNC: 18 MG/DL (ref 9–23)
BUN/CREAT SERPL: 24 (ref 7–25)
CALCIUM SPEC-SCNC: 9.6 MG/DL (ref 8.7–10.4)
CANCER AG125 SERPL QL: 5.6 U/ML (ref 0–30.2)
CHLORIDE SERPL-SCNC: 106 MMOL/L (ref 99–109)
CHOLEST SERPL-MCNC: 160 MG/DL (ref 0–200)
CLARITY, POC: CLEAR
CO2 SERPL-SCNC: 27 MMOL/L (ref 20–31)
COLOR UR: YELLOW
CREAT BLD-MCNC: 0.75 MG/DL (ref 0.6–1.3)
DEPRECATED RDW RBC AUTO: 45.2 FL (ref 37–54)
ERYTHROCYTE [DISTWIDTH] IN BLOOD BY AUTOMATED COUNT: 13.2 % (ref 11.3–14.5)
GFR SERPL CREATININE-BSD FRML MDRD: 82 ML/MIN/1.73
GLOBULIN UR ELPH-MCNC: 2.5 GM/DL
GLUCOSE BLD-MCNC: 85 MG/DL (ref 70–100)
GLUCOSE UR STRIP-MCNC: NEGATIVE MG/DL
HBA1C MFR BLD: 5.3 % (ref 4.8–5.6)
HCT VFR BLD AUTO: 46 % (ref 34.5–44)
HDLC SERPL-MCNC: 46 MG/DL (ref 40–60)
HGB BLD-MCNC: 14.9 G/DL (ref 11.5–15.5)
KETONES UR QL: NEGATIVE
LEUKOCYTE EST, POC: ABNORMAL
MCH RBC QN AUTO: 30.3 PG (ref 27–31)
MCHC RBC AUTO-ENTMCNC: 32.4 G/DL (ref 32–36)
MCV RBC AUTO: 93.5 FL (ref 80–99)
NITRITE UR-MCNC: NEGATIVE MG/ML
PH UR: 5.5 [PH] (ref 5–8)
PLATELET # BLD AUTO: 333 10*3/MM3 (ref 150–450)
PMV BLD AUTO: 9.8 FL (ref 6–12)
POTASSIUM BLD-SCNC: 3.8 MMOL/L (ref 3.5–5.5)
PROT SERPL-MCNC: 7 G/DL (ref 5.7–8.2)
PROT UR STRIP-MCNC: NEGATIVE MG/DL
RBC # BLD AUTO: 4.92 10*6/MM3 (ref 3.89–5.14)
RBC # UR STRIP: NEGATIVE /UL
SODIUM BLD-SCNC: 143 MMOL/L (ref 132–146)
SP GR UR: 1.03 (ref 1–1.03)
TRIGL SERPL-MCNC: 118 MG/DL (ref 0–150)
TSH SERPL DL<=0.05 MIU/L-ACNC: 0.1 MIU/ML (ref 0.35–5.35)
UROBILINOGEN UR QL: NORMAL
VIT B12 BLD-MCNC: 1008 PG/ML (ref 211–911)
WBC NRBC COR # BLD: 5.38 10*3/MM3 (ref 3.5–10.8)

## 2019-03-13 PROCEDURE — 93000 ELECTROCARDIOGRAM COMPLETE: CPT | Performed by: NURSE PRACTITIONER

## 2019-03-13 PROCEDURE — 84443 ASSAY THYROID STIM HORMONE: CPT | Performed by: NURSE PRACTITIONER

## 2019-03-13 PROCEDURE — 85027 COMPLETE CBC AUTOMATED: CPT | Performed by: NURSE PRACTITIONER

## 2019-03-13 PROCEDURE — 82306 VITAMIN D 25 HYDROXY: CPT | Performed by: NURSE PRACTITIONER

## 2019-03-13 PROCEDURE — 99213 OFFICE O/P EST LOW 20 MIN: CPT | Performed by: NURSE PRACTITIONER

## 2019-03-13 PROCEDURE — 86304 IMMUNOASSAY TUMOR CA 125: CPT | Performed by: NURSE PRACTITIONER

## 2019-03-13 PROCEDURE — 90632 HEPA VACCINE ADULT IM: CPT | Performed by: NURSE PRACTITIONER

## 2019-03-13 PROCEDURE — 99396 PREV VISIT EST AGE 40-64: CPT | Performed by: NURSE PRACTITIONER

## 2019-03-13 PROCEDURE — 90471 IMMUNIZATION ADMIN: CPT | Performed by: NURSE PRACTITIONER

## 2019-03-13 PROCEDURE — 82607 VITAMIN B-12: CPT | Performed by: NURSE PRACTITIONER

## 2019-03-13 PROCEDURE — 80053 COMPREHEN METABOLIC PANEL: CPT | Performed by: NURSE PRACTITIONER

## 2019-03-13 PROCEDURE — 83036 HEMOGLOBIN GLYCOSYLATED A1C: CPT | Performed by: NURSE PRACTITIONER

## 2019-03-13 PROCEDURE — 81003 URINALYSIS AUTO W/O SCOPE: CPT | Performed by: NURSE PRACTITIONER

## 2019-03-13 PROCEDURE — 80061 LIPID PANEL: CPT | Performed by: NURSE PRACTITIONER

## 2019-03-13 RX ORDER — MECLIZINE HYDROCHLORIDE 25 MG/1
25 TABLET ORAL 3 TIMES DAILY PRN
Qty: 30 TABLET | Refills: 0 | Status: SHIPPED | OUTPATIENT
Start: 2019-03-13 | End: 2021-07-26 | Stop reason: SDUPTHER

## 2019-03-13 RX ORDER — SACCHAROMYCES BOULARDII 250 MG
250 CAPSULE ORAL DAILY
COMMUNITY
End: 2019-08-28 | Stop reason: ALTCHOICE

## 2019-03-13 NOTE — PROGRESS NOTES
Patient Care Team:  Trisha Aggarwal APRN as PCP - General (Nurse Practitioner)  Lucy Harris MD as Consulting Physician (Obstetrics and Gynecology)  Kimberly Vance MD as Consulting Physician (Endocrinology)     Chief complaint: Patient is in today for a physical          Patient is a 48 y.o. female who presents for her yearly physical exam.     HPI      Here for annual exam, but has suman having some chest tightness for the last couple of weeks intermittently.    She has had some past issues with vertigo and has meclizine for this, but she has noticed that the dizziness is a little more often recently.  She is having chest heaviness and tightness.  She reports this occurs at rest and with exercise. Occurred last week and lasted several hours and resolved with rest.   Lasted several hours aftereating chocolate ice cream last week. Has not correlated with PO intake at any other episode.   Denies n/v/d, fever    Followed by Dr. Christensen for gynecology and Dr. Vance for endocrinology.     She has a history of thyroid cancer in 2007. She underwent a thyroidectomy and I - 131 was administered afterwards.  In July 2008 she had repeat whole body scan and recurrent activity in the thyroid bed was seen, for which patient received additional therapy. Thyroglobulin has beenundetectable since then.  Her last visit with Dr. Vance was 3/2018  Last TSH was appropriately suppressed in 12/2018.  Currently compliant with taking levothyroxine 125mg daily.   She would like me to take over monitoring her thyroid     Vitamin D deficiency-her last vitamin D was normal at 43.5 and 12/2018.  She has not been taking vitamin D supplementation since that time.     Menopause- her LMP was 11/2016.  She does have some occasional hot flashes/night sweats. Denies any mood or vaginal symptoms. She does not wish to take any medications for this at this time. DEXA was normal in 9/2018    GERD- chronic and well controlled with prevacid 30mg daily.  Denies  regurgitation, early satiety, cough, trouble swallowing, N/V, abdominal pain, and melena.  Has ioccasional heartburn but is diet related.       Health maintenance:              Influenza: 10/2017  Tdap: 2/14/2018  Pap: 2017 per GYN (torre)  Mammogram:4/2018  Hepatitis a vaccination: 9/2018-due for second  Dexa: 2003, 9/2018 (nl)  Tobacco use: denies     Diet: She reports that she eats a lot 5 meat and potatoes, that she has been trying to eat better recently.    Exercise: daily walking on treadmill 5 miles.     Health Maintenance Summary       Status Date      COLONOSCOPY Overdue 4/12/2017     ZOSTER VACCINE Overdue 4/9/2018     ANNUAL PHYSICAL Next Due 3/14/2020      Done 2/14/2018 Registry Metric: Last Annual Physical     Patient has more history with this topic...    PAP SMEAR Next Due 4/1/2020      Patient-Reported (Performed Externally) 4/1/2017     MAMMOGRAM Next Due 4/16/2020      Done 4/16/2018 MAMMO SCREENING DIGITAL TOMOSYNTHESIS BILATERAL W CAD     Patient has more history with this topic...    TDAP/TD VACCINES Next Due 2/14/2028      Done 2/14/2018 Imm Admin: Tdap    INFLUENZA VACCINE This plan is no longer active.      Done 10/1/2018 Imm Admin: Influenza, Unspecified     Patient has more history with this topic...              Review of Systems   Constitutional: Positive for diaphoresis (hot flahes/ ). Negative for appetite change, chills, fatigue and fever.   HENT: Positive for congestion (intermittently ) and ear pain (fullness). Negative for postnasal drip, rhinorrhea, sinus pressure and sore throat.    Eyes: Negative.  Negative for itching and visual disturbance.   Respiratory: Positive for chest tightness. Negative for cough, shortness of breath and wheezing.    Cardiovascular: Positive for chest pain (tightness). Negative for palpitations and leg swelling.   Gastrointestinal: Negative for abdominal pain, anal bleeding, blood in stool, constipation, diarrhea, nausea and vomiting.         Some heartburn but well controlled with prevacid.   Occasional bloating.   Endocrine: Positive for cold intolerance and heat intolerance. Negative for polydipsia, polyphagia and polyuria.   Genitourinary: Negative for difficulty urinating, dysuria, frequency, hematuria and urgency.   Musculoskeletal: Negative for arthralgias, back pain, joint swelling and myalgias.   Skin: Negative for rash and wound.   Allergic/Immunologic: Negative for environmental allergies and food allergies.   Neurological: Positive for dizziness. Negative for syncope, numbness and headaches.   Hematological: Negative for adenopathy. Does not bruise/bleed easily.   Psychiatric/Behavioral: Negative for dysphoric mood, self-injury, sleep disturbance and suicidal ideas. The patient is not nervous/anxious.          History  Past Medical History:   Diagnosis Date   • GERD (gastroesophageal reflux disease)    • H/O bone density study 09/2018    normal   • H/O mammogram 02/01/2017   • Hx of radiation therapy    • Migraine    • Pap smear for cervical cancer screening 04/01/2017   • Thyroid cancer (CMS/[facility]) 05/2007    radioiodine tx       Past Surgical History:   Procedure Laterality Date   • TONSILLECTOMY     • TOTAL THYROIDECTOMY  05/2007    Dr. FALCON   • VAGINAL DELIVERY      x1      Allergies   Allergen Reactions   • Antihistamines, Chlorpheniramine-Type Palpitations     TABS.  Elevated heart rate     • Codeine Rash      Family History   Problem Relation Age of Onset   • Diabetes type I Father    • Kidney failure Father 42   • Heart attack Maternal Grandmother 72   • Arthritis Maternal Grandmother    • Diabetes type I Daughter    • Hypertension Mother    • Arthritis Maternal Grandfather    • Arthritis Paternal Grandmother    • Arthritis Paternal Grandfather    • Heart attack Paternal Grandfather 72   • Colon cancer Paternal Grandfather    • No Known Problems Brother      Social History     Socioeconomic History   • Marital status:      Spouse  "name: Not on file   • Number of children: Not on file   • Years of education: Not on file   • Highest education level: Not on file   Social Needs   • Financial resource strain: Not on file   • Food insecurity - worry: Not on file   • Food insecurity - inability: Not on file   • Transportation needs - medical: Not on file   • Transportation needs - non-medical: Not on file   Occupational History   • Occupation: refer coord   Tobacco Use   • Smoking status: Never Smoker   • Smokeless tobacco: Never Used   Substance and Sexual Activity   • Alcohol use: No   • Drug use: No   • Sexual activity: Yes     Partners: Male     Birth control/protection: None     Comment:    Other Topics Concern   • Not on file   Social History Narrative    Lives with spouse and daughter        Current Outpatient Medications:   •  albuterol (PROVENTIL HFA;VENTOLIN HFA) 108 (90 Base) MCG/ACT inhaler, Inhale 2 puffs Every 4 (Four) Hours As Needed for Wheezing., Disp: 1 inhaler, Rfl: 0  •  lansoprazole (PREVACID) 30 MG capsule, Take 1 capsule by mouth Daily., Disp: 90 capsule, Rfl: 1  •  Multiple Vitamins-Minerals (HAIR SKIN NAILS PO), Take  by mouth., Disp: , Rfl:   •  saccharomyces boulardii (FLORASTOR) 250 MG capsule, Take 250 mg by mouth Daily., Disp: , Rfl:   •  SYNTHROID 125 MCG tablet, TAKE ONE TABLET BY MOUTH DAILY, Disp: 30 tablet, Rfl: 4  •  meclizine (ANTIVERT) 25 MG tablet, Take 1 tablet by mouth 3 (Three) Times a Day As Needed for dizziness., Disp: 30 tablet, Rfl: 0   Immunization History   Administered Date(s) Administered   • Hepatitis A 09/13/2018, 03/13/2019   • Influenza, Unspecified 10/01/2017, 10/01/2018   • Tdap 02/14/2018   • Tetanus 01/01/2005                   /70   Pulse 81   Temp 98.3 °F (36.8 °C)   Resp 16   Ht 168.4 cm (66.3\")   Wt 74.6 kg (164 lb 8 oz)   SpO2 98%   Breastfeeding? No   BMI 26.31 kg/m²       Physical Exam   Constitutional: She is oriented to person, place, and time. Vital signs are " normal. She appears well-developed and well-nourished. No distress.   HENT:   Head: Normocephalic and atraumatic.   Right Ear: Tympanic membrane and external ear normal.   Left Ear: Tympanic membrane and external ear normal.   Nose: Nose normal. Right sinus exhibits no maxillary sinus tenderness and no frontal sinus tenderness. Left sinus exhibits no maxillary sinus tenderness and no frontal sinus tenderness.   Mouth/Throat: Uvula is midline, oropharynx is clear and moist and mucous membranes are normal.   Eyes: Conjunctivae, EOM and lids are normal. Pupils are equal, round, and reactive to light. Right eye exhibits no discharge. Left eye exhibits no discharge. No scleral icterus.   Neck: Normal range of motion. Neck supple. No JVD present. Carotid bruit is not present. No tracheal deviation present. No thyroid mass (S/P thyroidectomy) present.   Cardiovascular: Normal rate, regular rhythm, normal heart sounds and intact distal pulses. Exam reveals no gallop and no friction rub.   No murmur heard.  Pulmonary/Chest: Effort normal and breath sounds normal. No respiratory distress. She exhibits no tenderness.   Abdominal: Soft. Normal appearance and bowel sounds are normal. She exhibits no distension and no mass. There is no hepatosplenomegaly. There is no tenderness. There is no rigidity, no rebound, no guarding and no CVA tenderness. No hernia.   Musculoskeletal: Normal range of motion. She exhibits no edema, tenderness or deformity.   Lymphadenopathy:        Head (right side): No submental, no submandibular, no tonsillar, no preauricular, no posterior auricular and no occipital adenopathy present.        Head (left side): No submental, no submandibular, no tonsillar, no preauricular, no posterior auricular and no occipital adenopathy present.     She has no cervical adenopathy.   Neurological: She is alert and oriented to person, place, and time. She has normal strength and normal reflexes. She displays normal  reflexes. No cranial nerve deficit or sensory deficit. Coordination and gait normal.   Skin: Skin is warm and dry. Capillary refill takes less than 2 seconds. No rash noted. She is not diaphoretic. No erythema. No pallor.   Psychiatric: She has a normal mood and affect. Her speech is normal and behavior is normal. Thought content normal. She is attentive.   Nursing note and vitals reviewed.          ECG 12 Lead  Date/Time: 3/13/2019 11:59 AM  Performed by: Trisha Aggarwal APRN  Authorized by: Trisha Aggarwal APRN   Comparison: not compared with previous ECG   Previous ECG: no previous ECG available  Rhythm: sinus rhythm  Rate: normal  Conduction: conduction normal  ST Depression: V3 and V2 (minimal)  T Waves: T waves normal  QRS axis: normal    Clinical impression: non-specific ECG  Comments: Pr 152  qrs 84  Qt/qtc 353/388  p-r-t 30  16  17                    Diagnoses and all orders for this visit:    Annual physical exam  -     CBC (No Diff)  -     Comprehensive Metabolic Panel  -     Lipid Panel  -     TSH  -     Vitamin B12  -     Vitamin D 25 Hydroxy  -     Hemoglobin A1c  -       -     POC Urinalysis Dipstick, Automated  -     Cancel: POCT urinalysis dipstick, automated    Chest tightness  -     ECG 12 Lead  -     Adult Transthoracic Echo Complete W/ Cont if Necessary Per Protocol; Future  -     Treadmill Stress Test; Future    Diabetes mellitus screening  -     Hemoglobin A1c    Vitamin D deficiency  -     Vitamin D 25 Hydroxy    Postoperative hypothyroidism  -     TSH    Gastroesophageal reflux disease without esophagitis    Papillary carcinoma of thyroid (CMS/HCC)  -     TSH  -         Menopause    Lipid screening  -     Lipid Panel    B12 deficiency  -     Vitamin B12    Need for vaccination  -     Hepatitis A Vaccine Adult IM    Colon cancer screening  -     Ambulatory Referral For Screening Colonoscopy    Dizziness  -     meclizine (ANTIVERT) 25 MG tablet; Take 1 tablet by mouth 3 (Three)  Times a Day As Needed for dizziness.  -     Treadmill Stress Test; Future    Other orders  -     saccharomyces boulardii (FLORASTOR) 250 MG capsule; Take 250 mg by mouth Daily.  -     SCANNED EKG         Labs sent and reviewed.  She will restart vitamin D3 1000 units daily.  Immunizations and screenings : Second hepatitis A vaccination administered today.  She will check with her pharmacy about zoster vaccination.  She will schedule her mammogram.  She will continue her Pap smears and breast exams per gynecology.  Colonoscopy ordered.  Counseling: Diet and exercise for weight reduction  Follow up: Return for will call with results.  And can plan follow-up accordingly  EKG shows normal sinus rhythm.  Will obtain echo and stress test for cardiac workup.  She does not want to see cardiology at this point.  Consider GI evaluation if her cardiac evaluation is negative.    Recent Results (from the past 168 hour(s))   CBC (No Diff)    Collection Time: 03/13/19  8:02 AM   Result Value Ref Range    WBC 5.38 3.50 - 10.80 10*3/mm3    RBC 4.92 3.89 - 5.14 10*6/mm3    Hemoglobin 14.9 11.5 - 15.5 g/dL    Hematocrit 46.0 (H) 34.5 - 44.0 %    MCV 93.5 80.0 - 99.0 fL    MCH 30.3 27.0 - 31.0 pg    MCHC 32.4 32.0 - 36.0 g/dL    RDW 13.2 11.3 - 14.5 %    RDW-SD 45.2 37.0 - 54.0 fl    MPV 9.8 6.0 - 12.0 fL    Platelets 333 150 - 450 10*3/mm3   Comprehensive Metabolic Panel    Collection Time: 03/13/19  8:02 AM   Result Value Ref Range    Glucose 85 70 - 100 mg/dL    BUN 18 9 - 23 mg/dL    Creatinine 0.75 0.60 - 1.30 mg/dL    Sodium 143 132 - 146 mmol/L    Potassium 3.8 3.5 - 5.5 mmol/L    Chloride 106 99 - 109 mmol/L    CO2 27.0 20.0 - 31.0 mmol/L    Calcium 9.6 8.7 - 10.4 mg/dL    Total Protein 7.0 5.7 - 8.2 g/dL    Albumin 4.55 3.20 - 4.80 g/dL    ALT (SGPT) 23 7 - 40 U/L    AST (SGOT) 25 0 - 33 U/L    Alkaline Phosphatase 133 (H) 25 - 100 U/L    Total Bilirubin 0.5 0.3 - 1.2 mg/dL    eGFR Non African Amer 82 >60 mL/min/1.73     Globulin 2.5 gm/dL    A/G Ratio 1.9 1.5 - 2.5 g/dL    BUN/Creatinine Ratio 24.0 7.0 - 25.0    Anion Gap 10.0 3.0 - 11.0 mmol/L   Lipid Panel    Collection Time: 03/13/19  8:02 AM   Result Value Ref Range    Total Cholesterol 160 0 - 200 mg/dL    Triglycerides 118 0 - 150 mg/dL    HDL Cholesterol 46 40 - 60 mg/dL    LDL Cholesterol  102 0 - 130 mg/dL   TSH    Collection Time: 03/13/19  8:02 AM   Result Value Ref Range    TSH 0.099 (L) 0.350 - 5.350 mIU/mL   Vitamin B12    Collection Time: 03/13/19  8:02 AM   Result Value Ref Range    Vitamin B-12 1,008 (H) 211 - 911 pg/mL   Vitamin D 25 Hydroxy    Collection Time: 03/13/19  8:02 AM   Result Value Ref Range    25 Hydroxy, Vitamin D 29.8 ng/ml   Hemoglobin A1c    Collection Time: 03/13/19  8:02 AM   Result Value Ref Range    Hemoglobin A1C 5.30 4.80 - 5.60 %       Collection Time: 03/13/19  8:02 AM   Result Value Ref Range     5.6 0.0 - 30.2 U/mL   POC Urinalysis Dipstick, Automated    Collection Time: 03/13/19 11:14 AM   Result Value Ref Range    Color Yellow Yellow, Straw, Dark Yellow, Maribel    Clarity, UA Clear Clear    Specific Gravity  1.030 1.005 - 1.030    pH, Urine 5.5 5.0 - 8.0    Leukocytes Trace (A) Negative    Nitrite, UA Negative Negative    Protein, POC Negative Negative mg/dL    Glucose, UA Negative Negative, 1000 mg/dL (3+) mg/dL    Ketones, UA Negative Negative    Urobilinogen, UA Normal Normal    Bilirubin Negative Negative    Blood, UA Negative Negative         Trisha Aggarwal, APRN   3/16/2019   8:15 PM

## 2019-03-14 ENCOUNTER — TELEPHONE (OUTPATIENT)
Dept: GASTROENTEROLOGY | Facility: CLINIC | Age: 49
End: 2019-03-14

## 2019-03-14 NOTE — TELEPHONE ENCOUNTER
I SCHEDULED HER COLONOSCOPY FOR 7/30/19 - SHE JUST NEEDS A LETTER SENT TO HER FOR HER INSTRUCTIONS ;]

## 2019-03-26 ENCOUNTER — TELEPHONE (OUTPATIENT)
Dept: INTERNAL MEDICINE | Facility: CLINIC | Age: 49
End: 2019-03-26

## 2019-03-26 ENCOUNTER — HOSPITAL ENCOUNTER (OUTPATIENT)
Dept: CARDIOLOGY | Facility: HOSPITAL | Age: 49
Discharge: HOME | End: 2019-03-26
Admitting: NURSE PRACTITIONER

## 2019-03-26 ENCOUNTER — HOSPITAL ENCOUNTER (OUTPATIENT)
Dept: GENERAL RADIOLOGY | Facility: HOSPITAL | Age: 49
Discharge: HOME | End: 2019-03-26

## 2019-03-26 VITALS — BODY MASS INDEX: 26.36 KG/M2 | WEIGHT: 164 LBS | HEIGHT: 66 IN

## 2019-03-26 DIAGNOSIS — R07.89 CHEST TIGHTNESS: ICD-10-CM

## 2019-03-26 DIAGNOSIS — R07.89 CHEST TIGHTNESS: Primary | ICD-10-CM

## 2019-03-26 LAB
BH CV ECHO MEAS - AO ROOT AREA (BSA CORRECTED): 1.9
BH CV ECHO MEAS - AO ROOT AREA: 9.6 CM^2
BH CV ECHO MEAS - AO ROOT DIAM: 3.5 CM
BH CV ECHO MEAS - BSA(HAYCOCK): 1.9 M^2
BH CV ECHO MEAS - BSA: 1.8 M^2
BH CV ECHO MEAS - BZI_BMI: 26.5 KILOGRAMS/M^2
BH CV ECHO MEAS - BZI_METRIC_HEIGHT: 167.6 CM
BH CV ECHO MEAS - BZI_METRIC_WEIGHT: 74.4 KG
BH CV ECHO MEAS - EDV(CUBED): 104.5 ML
BH CV ECHO MEAS - EDV(MOD-SP2): 65 ML
BH CV ECHO MEAS - EDV(MOD-SP4): 74 ML
BH CV ECHO MEAS - EDV(TEICH): 102.9 ML
BH CV ECHO MEAS - EF(CUBED): 77.4 %
BH CV ECHO MEAS - EF(MOD-BP): 64 %
BH CV ECHO MEAS - EF(MOD-SP2): 66.2 %
BH CV ECHO MEAS - EF(MOD-SP4): 64.9 %
BH CV ECHO MEAS - EF(TEICH): 69.5 %
BH CV ECHO MEAS - ESV(CUBED): 23.6 ML
BH CV ECHO MEAS - ESV(MOD-SP2): 22 ML
BH CV ECHO MEAS - ESV(MOD-SP4): 26 ML
BH CV ECHO MEAS - ESV(TEICH): 31.4 ML
BH CV ECHO MEAS - FS: 39.1 %
BH CV ECHO MEAS - IVS/LVPW: 0.94
BH CV ECHO MEAS - IVSD: 0.9 CM
BH CV ECHO MEAS - LAD MAJOR: 3.9 CM
BH CV ECHO MEAS - LAT PEAK E' VEL: 8.7 CM/SEC
BH CV ECHO MEAS - LATERAL E/E' RATIO: 6.9
BH CV ECHO MEAS - LV DIASTOLIC VOL/BSA (35-75): 40.3 ML/M^2
BH CV ECHO MEAS - LV MASS(C)D: 131.3 GRAMS
BH CV ECHO MEAS - LV MASS(C)DI: 71.4 GRAMS/M^2
BH CV ECHO MEAS - LV SYSTOLIC VOL/BSA (12-30): 14.1 ML/M^2
BH CV ECHO MEAS - LVIDD: 4.7 CM
BH CV ECHO MEAS - LVIDS: 2.9 CM
BH CV ECHO MEAS - LVLD AP2: 6.8 CM
BH CV ECHO MEAS - LVLD AP4: 6.7 CM
BH CV ECHO MEAS - LVLS AP2: 5.2 CM
BH CV ECHO MEAS - LVLS AP4: 5.5 CM
BH CV ECHO MEAS - LVPWD: 0.9 CM
BH CV ECHO MEAS - MED PEAK E' VEL: 9 CM/SEC
BH CV ECHO MEAS - MEDIAL E/E' RATIO: 6.6
BH CV ECHO MEAS - MV A MAX VEL: 60.2 CM/SEC
BH CV ECHO MEAS - MV DEC TIME: 0.23 SEC
BH CV ECHO MEAS - MV E MAX VEL: 59.7 CM/SEC
BH CV ECHO MEAS - MV E/A: 0.99
BH CV ECHO MEAS - PA ACC SLOPE: 461 CM/SEC^2
BH CV ECHO MEAS - PA ACC TIME: 0.13 SEC
BH CV ECHO MEAS - PA PR(ACCEL): 18.7 MMHG
BH CV ECHO MEAS - SI(CUBED): 44 ML/M^2
BH CV ECHO MEAS - SI(MOD-SP2): 23.4 ML/M^2
BH CV ECHO MEAS - SI(MOD-SP4): 26.1 ML/M^2
BH CV ECHO MEAS - SI(TEICH): 38.9 ML/M^2
BH CV ECHO MEAS - SV(CUBED): 80.8 ML
BH CV ECHO MEAS - SV(MOD-SP2): 43 ML
BH CV ECHO MEAS - SV(MOD-SP4): 48 ML
BH CV ECHO MEAS - SV(TEICH): 71.5 ML
BH CV ECHO MEAS - TAPSE (>1.6): 2.1 CM2
BH CV ECHO MEASUREMENTS AVERAGE E/E' RATIO: 6.75
BH CV VAS BP LEFT ARM: NORMAL MMHG
BH CV XLRA - RV BASE: 3.4 CM
BH CV XLRA - RV LENGTH: 6.6 CM
BH CV XLRA - RV MID: 2.8 CM
BH CV XLRA - TDI S': 14.8 CM/SEC
LEFT ATRIUM VOLUME INDEX: 17.4 ML/M^2
LEFT ATRIUM VOLUME: 32 ML
LV EF 2D ECHO EST: 68 %
MAXIMAL PREDICTED HEART RATE: 170 BPM
STRESS TARGET HR: 145 BPM

## 2019-03-26 PROCEDURE — 71046 X-RAY EXAM CHEST 2 VIEWS: CPT

## 2019-03-26 PROCEDURE — 93306 TTE W/DOPPLER COMPLETE: CPT

## 2019-03-26 PROCEDURE — 93306 TTE W/DOPPLER COMPLETE: CPT | Performed by: INTERNAL MEDICINE

## 2019-04-24 ENCOUNTER — APPOINTMENT (OUTPATIENT)
Dept: CARDIOLOGY | Facility: HOSPITAL | Age: 49
End: 2019-04-24

## 2019-05-16 ENCOUNTER — TELEPHONE (OUTPATIENT)
Dept: INTERNAL MEDICINE | Facility: CLINIC | Age: 49
End: 2019-05-16

## 2019-05-16 DIAGNOSIS — K21.9 GASTROESOPHAGEAL REFLUX DISEASE WITHOUT ESOPHAGITIS: Primary | ICD-10-CM

## 2019-05-16 RX ORDER — LANSOPRAZOLE 30 MG/1
30 CAPSULE, DELAYED RELEASE ORAL DAILY
Qty: 90 CAPSULE | Refills: 3 | Status: SHIPPED | OUTPATIENT
Start: 2019-05-16 | End: 2020-05-11 | Stop reason: SDUPTHER

## 2019-06-20 RX ORDER — SODIUM, POTASSIUM,MAG SULFATES 17.5-3.13G
2 SOLUTION, RECONSTITUTED, ORAL ORAL TAKE AS DIRECTED
Qty: 354 ML | Refills: 0 | Status: SHIPPED | OUTPATIENT
Start: 2019-06-20 | End: 2019-08-28

## 2019-06-27 ENCOUNTER — TRANSCRIBE ORDERS (OUTPATIENT)
Dept: ADMINISTRATIVE | Facility: HOSPITAL | Age: 49
End: 2019-06-27

## 2019-06-27 DIAGNOSIS — Z12.31 VISIT FOR SCREENING MAMMOGRAM: Primary | ICD-10-CM

## 2019-07-02 RX ORDER — LEVOTHYROXINE SODIUM 125 MCG
TABLET ORAL
Qty: 30 TABLET | Refills: 3 | Status: SHIPPED | OUTPATIENT
Start: 2019-07-02 | End: 2019-08-30

## 2019-07-30 ENCOUNTER — OUTSIDE FACILITY SERVICE (OUTPATIENT)
Dept: GASTROENTEROLOGY | Facility: CLINIC | Age: 49
End: 2019-07-30

## 2019-07-30 ENCOUNTER — LAB REQUISITION (OUTPATIENT)
Dept: LAB | Facility: HOSPITAL | Age: 49
End: 2019-07-30

## 2019-07-30 DIAGNOSIS — Z12.11 ENCOUNTER FOR SCREENING FOR MALIGNANT NEOPLASM OF COLON: ICD-10-CM

## 2019-07-30 DIAGNOSIS — Z80.0 FAMILY HISTORY OF MALIGNANT NEOPLASM OF DIGESTIVE ORGAN: ICD-10-CM

## 2019-07-30 PROCEDURE — 45385 COLONOSCOPY W/LESION REMOVAL: CPT | Performed by: INTERNAL MEDICINE

## 2019-07-30 PROCEDURE — 88305 TISSUE EXAM BY PATHOLOGIST: CPT | Performed by: INTERNAL MEDICINE

## 2019-07-31 LAB
CYTO UR: NORMAL
LAB AP CASE REPORT: NORMAL
LAB AP CLINICAL INFORMATION: NORMAL
PATH REPORT.FINAL DX SPEC: NORMAL
PATH REPORT.GROSS SPEC: NORMAL

## 2019-08-22 ENCOUNTER — HOSPITAL ENCOUNTER (OUTPATIENT)
Dept: MAMMOGRAPHY | Facility: HOSPITAL | Age: 49
Discharge: HOME | End: 2019-08-22
Admitting: NURSE PRACTITIONER

## 2019-08-22 DIAGNOSIS — Z12.31 VISIT FOR SCREENING MAMMOGRAM: ICD-10-CM

## 2019-08-22 PROCEDURE — 77063 BREAST TOMOSYNTHESIS BI: CPT

## 2019-08-22 PROCEDURE — 77067 SCR MAMMO BI INCL CAD: CPT | Performed by: RADIOLOGY

## 2019-08-22 PROCEDURE — 77067 SCR MAMMO BI INCL CAD: CPT

## 2019-08-22 PROCEDURE — 77063 BREAST TOMOSYNTHESIS BI: CPT | Performed by: RADIOLOGY

## 2019-08-28 ENCOUNTER — OFFICE VISIT (OUTPATIENT)
Dept: ENDOCRINOLOGY | Facility: CLINIC | Age: 49
End: 2019-08-28

## 2019-08-28 VITALS
WEIGHT: 178.3 LBS | SYSTOLIC BLOOD PRESSURE: 121 MMHG | OXYGEN SATURATION: 98 % | DIASTOLIC BLOOD PRESSURE: 80 MMHG | BODY MASS INDEX: 28.66 KG/M2 | HEART RATE: 89 BPM | HEIGHT: 66 IN

## 2019-08-28 DIAGNOSIS — E89.0 POSTOPERATIVE HYPOTHYROIDISM: ICD-10-CM

## 2019-08-28 DIAGNOSIS — C73 PAPILLARY CARCINOMA OF THYROID (HCC): Primary | ICD-10-CM

## 2019-08-28 PROCEDURE — 86800 THYROGLOBULIN ANTIBODY: CPT

## 2019-08-28 PROCEDURE — 99214 OFFICE O/P EST MOD 30 MIN: CPT | Performed by: INTERNAL MEDICINE

## 2019-08-28 PROCEDURE — 84432 ASSAY OF THYROGLOBULIN: CPT | Performed by: INTERNAL MEDICINE

## 2019-08-28 PROCEDURE — 84443 ASSAY THYROID STIM HORMONE: CPT | Performed by: INTERNAL MEDICINE

## 2019-08-28 PROCEDURE — 84439 ASSAY OF FREE THYROXINE: CPT | Performed by: INTERNAL MEDICINE

## 2019-08-28 NOTE — PROGRESS NOTES
Chief complaint  Thyroid Cancer (Follow UP & U/S)    Subjective   Joyce Orona is a 49 y.o. female is here today for follow-up.  Follow-up for thyroid cancer.   S/p thyroidectomy 2007 and I - 131 was administered afterwards. She had lab work to follow the cancer and the work -up was negative.   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 T1 NxMx.  In July 2007 patient received 100.4 millicuries of radioactive iodine and uptake in the thyroid bed was seen on posttreatment scan. In July 2008 patient had repeat whole body scan and recurrent activity in the thyroid bed was seen, for which patient received additional therapy.  Thyroglobulin is undetectable since then and she remained on suppression therapy  Takes Synthroid 137 mcg and reduced the dose to 125 mcg  (TSh was 0.04 and free T4 1.77) , she had hot flushes and the nightsweats, increased anxiety (wakes up in the middle of the night)     Vit D deficency was dx on labs and she was given supplements    C/o hot flushes and night sweats, fatigue.       Medications    Current Outpatient Medications:   •  albuterol (PROVENTIL HFA;VENTOLIN HFA) 108 (90 Base) MCG/ACT inhaler, Inhale 2 puffs Every 4 (Four) Hours As Needed for Wheezing., Disp: 1 inhaler, Rfl: 0  •  lansoprazole (PREVACID) 30 MG capsule, Take 1 capsule by mouth Daily., Disp: 90 capsule, Rfl: 3  •  meclizine (ANTIVERT) 25 MG tablet, Take 1 tablet by mouth 3 (Three) Times a Day As Needed for dizziness., Disp: 30 tablet, Rfl: 0  •  Multiple Vitamins-Minerals (HAIR SKIN NAILS PO), Take  by mouth., Disp: , Rfl:   •  SYNTHROID 125 MCG tablet, TAKE ONE TABLET BY MOUTH DAILY, Disp: 30 tablet, Rfl: 3    PMH  The following portions of the patient's history were reviewed and updated as appropriate: allergies, current medications, past family history, past medical history, past social history, past surgical history and problem  "list.    Review of systems  Review of Systems   All other systems reviewed and are negative.      Physical exam  Objective   Blood pressure 121/80, pulse 89, height 168.4 cm (66.3\"), weight 80.9 kg (178 lb 4.8 oz), last menstrual period 12/16/2016, SpO2 98 %, not currently breastfeeding.   Physical Exam   Constitutional: She is oriented to person, place, and time. She appears well-developed and well-nourished.   HENT:   Head: Normocephalic and atraumatic.   Eyes: Conjunctivae are normal.   Neck: No thyromegaly present.   The neck is supple and symmetric   Cardiovascular: Normal rate, regular rhythm and normal heart sounds.   Pulmonary/Chest: Effort normal and breath sounds normal.   Musculoskeletal: She exhibits no edema.   Lymphadenopathy:     She has no cervical adenopathy.   Neurological: She is alert and oriented to person, place, and time. She has normal reflexes.   Skin: Skin is warm and dry. No rash noted.   Psychiatric: She has a normal mood and affect. Thought content normal.   Vitals reviewed.        LABS AND IMAGING          Assessment  Assessment/Plan   Problem List Items Addressed This Visit        Endocrine    Hypothyroidism    Papillary carcinoma of thyroid (CMS/HCC) - Primary    Relevant Orders    US Thyroid (Completed)          Plan   Cont 125 mcg  Synthroid with the goal of TSH suppression. Continue with TG evaluation showed TG of 0.1. TFT at goal of therapy.  Ultrasound today - no evidence of locoregional thyroid cancer recurrence.     Follow-up in 1 year.     "

## 2019-08-29 LAB
T4 FREE SERPL-MCNC: 1.66 NG/DL (ref 0.93–1.7)
TSH SERPL DL<=0.05 MIU/L-ACNC: 0.22 UIU/ML (ref 0.27–4.2)

## 2019-08-30 DIAGNOSIS — R53.83 FATIGUE, UNSPECIFIED TYPE: ICD-10-CM

## 2019-08-30 DIAGNOSIS — R25.2 MUSCLE CRAMPS: Primary | ICD-10-CM

## 2019-08-30 DIAGNOSIS — Z78.9 LOW DENSITY LIPOPROTEIN (LDL) CHOLESTEROL LEVEL GREATER THAN OR EQUAL TO 100 MG/DL: ICD-10-CM

## 2019-08-30 DIAGNOSIS — E55.9 VITAMIN D INSUFFICIENCY: ICD-10-CM

## 2019-08-30 DIAGNOSIS — E89.0 POSTOPERATIVE HYPOTHYROIDISM: Primary | ICD-10-CM

## 2019-08-30 LAB
25(OH)D3 SERPL-MCNC: 36.1 NG/ML (ref 30–100)
ALBUMIN SERPL-MCNC: 4.6 G/DL (ref 3.5–5.2)
ALBUMIN/GLOB SERPL: 1.3 G/DL
ALP SERPL-CCNC: 134 U/L (ref 39–117)
ALT SERPL W P-5'-P-CCNC: 13 U/L (ref 1–33)
ANION GAP SERPL CALCULATED.3IONS-SCNC: 10.9 MMOL/L (ref 5–15)
AST SERPL-CCNC: 23 U/L (ref 1–32)
BILIRUB SERPL-MCNC: 0.4 MG/DL (ref 0.2–1.2)
BUN BLD-MCNC: 12 MG/DL (ref 6–20)
BUN/CREAT SERPL: 17.1 (ref 7–25)
CALCIUM SPEC-SCNC: 10.3 MG/DL (ref 8.6–10.5)
CHLORIDE SERPL-SCNC: 98 MMOL/L (ref 98–107)
CHOLEST SERPL-MCNC: 156 MG/DL (ref 0–200)
CO2 SERPL-SCNC: 28.1 MMOL/L (ref 22–29)
CREAT BLD-MCNC: 0.7 MG/DL (ref 0.57–1)
DEPRECATED RDW RBC AUTO: 45.9 FL (ref 37–54)
ERYTHROCYTE [DISTWIDTH] IN BLOOD BY AUTOMATED COUNT: 13.5 % (ref 12.3–15.4)
GFR SERPL CREATININE-BSD FRML MDRD: 88 ML/MIN/1.73
GLOBULIN UR ELPH-MCNC: 3.5 GM/DL
GLUCOSE BLD-MCNC: 87 MG/DL (ref 65–99)
HCT VFR BLD AUTO: 47.3 % (ref 34–46.6)
HDLC SERPL-MCNC: 53 MG/DL (ref 40–60)
HGB BLD-MCNC: 14.9 G/DL (ref 12–15.9)
LDLC SERPL CALC-MCNC: 83 MG/DL (ref 0–100)
LDLC/HDLC SERPL: 1.57 {RATIO}
MCH RBC QN AUTO: 29.5 PG (ref 26.6–33)
MCHC RBC AUTO-ENTMCNC: 31.5 G/DL (ref 31.5–35.7)
MCV RBC AUTO: 93.7 FL (ref 79–97)
PLATELET # BLD AUTO: 350 10*3/MM3 (ref 140–450)
PMV BLD AUTO: 9.4 FL (ref 6–12)
POTASSIUM BLD-SCNC: 4.1 MMOL/L (ref 3.5–5.2)
PROT SERPL-MCNC: 8.1 G/DL (ref 6–8.5)
RBC # BLD AUTO: 5.05 10*6/MM3 (ref 3.77–5.28)
REF LAB TEST RESULTS: NORMAL
SODIUM BLD-SCNC: 137 MMOL/L (ref 136–145)
TRIGL SERPL-MCNC: 98 MG/DL (ref 0–150)
VLDLC SERPL-MCNC: 19.6 MG/DL (ref 5–40)
WBC NRBC COR # BLD: 4.86 10*3/MM3 (ref 3.4–10.8)

## 2019-08-30 PROCEDURE — 80053 COMPREHEN METABOLIC PANEL: CPT | Performed by: NURSE PRACTITIONER

## 2019-08-30 PROCEDURE — 82306 VITAMIN D 25 HYDROXY: CPT | Performed by: NURSE PRACTITIONER

## 2019-08-30 PROCEDURE — 85027 COMPLETE CBC AUTOMATED: CPT | Performed by: NURSE PRACTITIONER

## 2019-08-30 PROCEDURE — 80061 LIPID PANEL: CPT | Performed by: NURSE PRACTITIONER

## 2019-08-30 RX ORDER — LEVOTHYROXINE SODIUM 137 MCG
137 TABLET ORAL DAILY
Qty: 90 TABLET | Refills: 1 | Status: SHIPPED | OUTPATIENT
Start: 2019-08-30 | End: 2019-11-06

## 2019-08-30 NOTE — PROGRESS NOTES
Pt reported muscle cramps at night and fatigue. Requests labs. Has thyroids labs with endo earlier this week.

## 2019-11-04 ENCOUNTER — LAB (OUTPATIENT)
Dept: INTERNAL MEDICINE | Facility: CLINIC | Age: 49
End: 2019-11-04

## 2019-11-04 DIAGNOSIS — E89.0 POSTOPERATIVE HYPOTHYROIDISM: ICD-10-CM

## 2019-11-04 LAB
T4 FREE SERPL-MCNC: 1.84 NG/DL (ref 0.93–1.7)
TSH SERPL DL<=0.05 MIU/L-ACNC: 0.02 UIU/ML (ref 0.27–4.2)

## 2019-11-04 PROCEDURE — 84443 ASSAY THYROID STIM HORMONE: CPT | Performed by: NURSE PRACTITIONER

## 2019-11-04 PROCEDURE — 84439 ASSAY OF FREE THYROXINE: CPT

## 2019-11-06 DIAGNOSIS — E89.0 POSTOPERATIVE HYPOTHYROIDISM: Primary | ICD-10-CM

## 2019-11-06 RX ORDER — LEVOTHYROXINE SODIUM 125 MCG
125 TABLET ORAL DAILY
Qty: 30 TABLET | Refills: 2 | Status: SHIPPED | OUTPATIENT
Start: 2019-11-06 | End: 2020-01-27 | Stop reason: SDUPTHER

## 2020-01-22 ENCOUNTER — LAB (OUTPATIENT)
Dept: INTERNAL MEDICINE | Facility: CLINIC | Age: 50
End: 2020-01-22

## 2020-01-22 DIAGNOSIS — E89.0 POSTOPERATIVE HYPOTHYROIDISM: ICD-10-CM

## 2020-01-22 LAB
T4 FREE SERPL-MCNC: 1.8 NG/DL (ref 0.93–1.7)
TSH SERPL DL<=0.05 MIU/L-ACNC: 0.04 UIU/ML (ref 0.27–4.2)

## 2020-01-22 PROCEDURE — 84439 ASSAY OF FREE THYROXINE: CPT | Performed by: NURSE PRACTITIONER

## 2020-01-22 PROCEDURE — 36415 COLL VENOUS BLD VENIPUNCTURE: CPT

## 2020-01-22 PROCEDURE — 84443 ASSAY THYROID STIM HORMONE: CPT | Performed by: NURSE PRACTITIONER

## 2020-01-27 ENCOUNTER — OFFICE VISIT (OUTPATIENT)
Dept: INTERNAL MEDICINE | Facility: CLINIC | Age: 50
End: 2020-01-27

## 2020-01-27 VITALS
BODY MASS INDEX: 27.64 KG/M2 | DIASTOLIC BLOOD PRESSURE: 80 MMHG | RESPIRATION RATE: 18 BRPM | OXYGEN SATURATION: 98 % | HEART RATE: 83 BPM | WEIGHT: 172 LBS | SYSTOLIC BLOOD PRESSURE: 128 MMHG | HEIGHT: 66 IN | TEMPERATURE: 98.7 F

## 2020-01-27 DIAGNOSIS — B96.89 ACUTE BACTERIAL SINUSITIS: Primary | ICD-10-CM

## 2020-01-27 DIAGNOSIS — J01.90 ACUTE BACTERIAL SINUSITIS: Primary | ICD-10-CM

## 2020-01-27 DIAGNOSIS — E89.0 POSTOPERATIVE HYPOTHYROIDISM: ICD-10-CM

## 2020-01-27 PROCEDURE — 99213 OFFICE O/P EST LOW 20 MIN: CPT | Performed by: NURSE PRACTITIONER

## 2020-01-27 RX ORDER — AMOXICILLIN AND CLAVULANATE POTASSIUM 875; 125 MG/1; MG/1
1 TABLET, FILM COATED ORAL 2 TIMES DAILY
Qty: 20 TABLET | Refills: 0 | Status: SHIPPED | OUTPATIENT
Start: 2020-01-27 | End: 2020-05-20

## 2020-01-27 RX ORDER — METHYLPREDNISOLONE 4 MG/1
TABLET ORAL
Qty: 21 TABLET | Refills: 0 | Status: SHIPPED | OUTPATIENT
Start: 2020-01-27 | End: 2020-05-20

## 2020-01-27 RX ORDER — LEVOTHYROXINE SODIUM 125 MCG
125 TABLET ORAL DAILY
Qty: 30 TABLET | Refills: 6 | Status: SHIPPED | OUTPATIENT
Start: 2020-01-27 | End: 2020-05-22

## 2020-01-27 NOTE — PROGRESS NOTES
Subjective   Joyce Orona is a 49 y.o. female.     Chief Complaint   Patient presents with   • Sinusitis     sore throat, bilateral earache, sinus pressure (face hurts), cough at night.  2 days       Sinus Problem   This is a new problem. The current episode started in the past 7 days. The problem has been gradually worsening since onset. There has been no fever. The pain is moderate. Associated symptoms include congestion, coughing, ear pain and sinus pressure. Pertinent negatives include no chills, diaphoresis, headaches, hoarse voice, neck pain, shortness of breath, sneezing, sore throat or swollen glands. Past treatments include nothing. The treatment provided no relief.      Needs refill on synthroid. Hx of thyroid cancer and thyroidectomy  Last TSH appropriately suppressed 1/2020    The following portions of the patient's history were reviewed and updated as appropriate: allergies, current medications, past family history, past medical history, past social history, past surgical history and problem list.    Review of Systems   Constitutional: Negative for appetite change, chills, diaphoresis, fatigue and fever.   HENT: Positive for congestion, ear pain, postnasal drip (at night), sinus pressure and sinus pain. Negative for hoarse voice, rhinorrhea, sneezing, sore throat and trouble swallowing.    Eyes: Negative for pain, discharge and itching.   Respiratory: Positive for cough. Negative for chest tightness and shortness of breath.    Gastrointestinal: Negative.    Genitourinary: Negative.    Musculoskeletal: Negative for myalgias and neck pain.   Neurological: Negative for dizziness, light-headedness and headaches.   Hematological: Negative for adenopathy.   Psychiatric/Behavioral: Positive for sleep disturbance.       Outpatient Medications Marked as Taking for the 1/27/20 encounter (Office Visit) with Trisha Aggarwal APRN   Medication Sig Dispense Refill   • albuterol (PROVENTIL HFA;VENTOLIN HFA)  108 (90 Base) MCG/ACT inhaler Inhale 2 puffs Every 4 (Four) Hours As Needed for Wheezing. 1 inhaler 0   • lansoprazole (PREVACID) 30 MG capsule Take 1 capsule by mouth Daily. 90 capsule 3   • meclizine (ANTIVERT) 25 MG tablet Take 1 tablet by mouth 3 (Three) Times a Day As Needed for dizziness. 30 tablet 0   • Multiple Vitamins-Minerals (HAIR SKIN NAILS PO) Take  by mouth.     • SYNTHROID 125 MCG tablet Take 1 tablet by mouth Daily. 30 tablet 6   • [DISCONTINUED] SYNTHROID 125 MCG tablet Take 1 tablet by mouth Daily. 30 tablet 2           Objective   Physical Exam   Constitutional: She is oriented to person, place, and time. She appears well-developed and well-nourished. No distress.   HENT:   Head: Normocephalic and atraumatic.   Right Ear: Tympanic membrane and external ear normal.   Left Ear: Tympanic membrane and external ear normal.   Nose: Mucosal edema and rhinorrhea present. Right sinus exhibits frontal sinus tenderness. Right sinus exhibits no maxillary sinus tenderness. Left sinus exhibits frontal sinus tenderness. Left sinus exhibits no maxillary sinus tenderness.   Mouth/Throat: Uvula is midline, oropharynx is clear and moist and mucous membranes are normal. No oropharyngeal exudate.   Eyes: Pupils are equal, round, and reactive to light. Conjunctivae are normal. Right eye exhibits no discharge. Left eye exhibits no discharge.   Neck: Normal range of motion. Neck supple.   Cardiovascular: Normal rate, regular rhythm and normal heart sounds.   Pulmonary/Chest: Effort normal and breath sounds normal. No respiratory distress.   Abdominal: Soft. Normal appearance and bowel sounds are normal. There is no tenderness.   Musculoskeletal: Normal range of motion.   Lymphadenopathy:     She has no cervical adenopathy.   Neurological: She is alert and oriented to person, place, and time.   Skin: Skin is warm and dry. She is not diaphoretic.   Psychiatric: She has a normal mood and affect. Her behavior is normal.  "Judgment and thought content normal.   Nursing note and vitals reviewed.      Vitals:    01/27/20 1125   BP: 128/80   Pulse: 83   Resp: 18   Temp: 98.7 °F (37.1 °C)   SpO2: 98%   Weight: 78 kg (172 lb)  Comment: declined.  Pt reported   Height: 168.4 cm (66.3\")     Body mass index is 27.51 kg/m².        Assessment/Plan   Joyce was seen today for sinusitis.    Diagnoses and all orders for this visit:    Acute bacterial sinusitis  -     amoxicillin-clavulanate (AUGMENTIN) 875-125 MG per tablet; Take 1 tablet by mouth 2 (Two) Times a Day.  -     methylPREDNISolone (MEDROL, PAULINO,) 4 MG tablet; Take as directed on package instructions.    Postoperative hypothyroidism  -     SYNTHROID 125 MCG tablet; Take 1 tablet by mouth Daily.    meds as above  Increase rest and fluids.    Over-the-counter lozenges as needed for sore throat or cough, cepacol sample provided  Warm salt water gargles if needed for sore throat  Avoid smoke or fumes.    May use humidifier.    Encouraged saline nasal irrigations.  Synthroid refilled at same dose. Samples provided       Return if symptoms worsen or fail to improve.  I discussed my findings and recommendations with patient.  The plan of care was  discussed with patient. They verbalized understanding and agreement.  Patient was encouraged to keep me informed of any acute changes, lack of improvement, or any new concerning symptoms.       * Please note that portions of this note were completed with a voice recognition program. Efforts were made to edit the dictation but occasionally words are erroneously transcribed.   "

## 2020-01-28 DIAGNOSIS — R11.0 NAUSEA: Primary | ICD-10-CM

## 2020-01-28 RX ORDER — ONDANSETRON 4 MG/1
4 TABLET, ORALLY DISINTEGRATING ORAL EVERY 8 HOURS PRN
Qty: 30 TABLET | Refills: 0 | Status: SHIPPED | OUTPATIENT
Start: 2020-01-28 | End: 2020-05-20

## 2020-03-18 DIAGNOSIS — J30.2 SEASONAL ALLERGIES: Primary | ICD-10-CM

## 2020-03-18 RX ORDER — HYDROXYZINE HYDROCHLORIDE 10 MG/1
TABLET, FILM COATED ORAL
Qty: 60 TABLET | Refills: 0 | Status: SHIPPED | OUTPATIENT
Start: 2020-03-18 | End: 2020-07-06

## 2020-05-11 DIAGNOSIS — K21.9 GASTROESOPHAGEAL REFLUX DISEASE WITHOUT ESOPHAGITIS: ICD-10-CM

## 2020-05-11 RX ORDER — LANSOPRAZOLE 30 MG/1
30 CAPSULE, DELAYED RELEASE ORAL DAILY
Qty: 90 CAPSULE | Refills: 3 | Status: SHIPPED | OUTPATIENT
Start: 2020-05-11 | End: 2020-05-20

## 2020-05-20 ENCOUNTER — OFFICE VISIT (OUTPATIENT)
Dept: INTERNAL MEDICINE | Facility: CLINIC | Age: 50
End: 2020-05-20

## 2020-05-20 VITALS
WEIGHT: 172 LBS | HEIGHT: 66 IN | HEART RATE: 77 BPM | OXYGEN SATURATION: 98 % | SYSTOLIC BLOOD PRESSURE: 128 MMHG | BODY MASS INDEX: 27.64 KG/M2 | DIASTOLIC BLOOD PRESSURE: 84 MMHG | TEMPERATURE: 98 F | RESPIRATION RATE: 16 BRPM

## 2020-05-20 DIAGNOSIS — Z00.00 ANNUAL PHYSICAL EXAM: Primary | ICD-10-CM

## 2020-05-20 DIAGNOSIS — C73 PAPILLARY CARCINOMA OF THYROID (HCC): ICD-10-CM

## 2020-05-20 DIAGNOSIS — K21.9 GASTROESOPHAGEAL REFLUX DISEASE WITHOUT ESOPHAGITIS: ICD-10-CM

## 2020-05-20 DIAGNOSIS — E55.9 VITAMIN D DEFICIENCY: ICD-10-CM

## 2020-05-20 DIAGNOSIS — E89.0 POSTOPERATIVE HYPOTHYROIDISM: ICD-10-CM

## 2020-05-20 LAB
BILIRUB BLD-MCNC: NEGATIVE MG/DL
CLARITY, POC: CLEAR
COLOR UR: YELLOW
GLUCOSE UR STRIP-MCNC: NEGATIVE MG/DL
KETONES UR QL: NEGATIVE
LEUKOCYTE EST, POC: NEGATIVE
NITRITE UR-MCNC: NEGATIVE MG/ML
PH UR: 5.5 [PH] (ref 5–8)
PROT UR STRIP-MCNC: NEGATIVE MG/DL
RBC # UR STRIP: NEGATIVE /UL
SP GR UR: 1.03 (ref 1–1.03)
UROBILINOGEN UR QL: NORMAL

## 2020-05-20 PROCEDURE — 81003 URINALYSIS AUTO W/O SCOPE: CPT | Performed by: NURSE PRACTITIONER

## 2020-05-20 PROCEDURE — 99396 PREV VISIT EST AGE 40-64: CPT | Performed by: NURSE PRACTITIONER

## 2020-05-20 RX ORDER — DEXLANSOPRAZOLE 30 MG/1
30 CAPSULE, DELAYED RELEASE ORAL DAILY
Qty: 30 CAPSULE | Refills: 3 | Status: SHIPPED | OUTPATIENT
Start: 2020-05-20 | End: 2020-06-19

## 2020-05-20 NOTE — PATIENT INSTRUCTIONS
Health Maintenance   Topic Date Due   • HEPATITIS C SCREENING  04/12/2017   • ZOSTER VACCINE (1 of 2) 04/09/2018   • PAP SMEAR  04/01/2020   • INFLUENZA VACCINE  08/01/2020   • MAMMOGRAM  08/22/2020   • DXA SCAN  09/17/2020   • ANNUAL PHYSICAL  05/21/2021   • TDAP/TD VACCINES (2 - Td) 02/14/2028   • COLONOSCOPY  07/30/2029

## 2020-05-20 NOTE — PROGRESS NOTES
Patient Care Team:  Trisha Aggarwal APRN as PCP - General (Nurse Practitioner)  Lucy Harris MD as Consulting Physician (Obstetrics and Gynecology)  Kimberly Vance MD as Consulting Physician (Endocrinology)  Lisandro Trujillo MD as Consulting Physician (Gastroenterology)     Chief complaint: Patient is in today for a physical          Patient is a 50 y.o. female who presents for her yearly physical exam.     HPI   Patient reports that she has been experiencing hot flashes particularly in the evenings for the last 2 months.  She thinks her hair is thinning and she is more fatigued than ever.  She does have a history of thyroid cancer and thyroidectomy.  Her last thyroid labs showed a slightly elevated free T4 but she was feeling great so we opted to keep her on that same dose.    GERD-chronic.  She reports that she has been having a slight sensation of fullness or food getting stuck.  She denies any coughing or choking.  She has been on Prevacid for years.  She has historically tried Prilosec, and Zantac without relief.  She has had no EGD.    Health maintenance/lifestyle:  Influenza: 2019 with work  Tdap: 2018, td 2005  Hep A: completed    Pap: 2017, thinks she has had once since with Dr. Harris, will check before we repeat.   Mammogram:8/2019  Menses: posmenopausal  Patient's last menstrual period was 12/16/2016.    Dexa:2018    Colonoscopy: 7/2019    Eye exam: Up-to-date  Dental exam: Up-to-date    Tobacco use: Denies  alcohol use occasional  Sleep: Wakes often with diaphoresis    Diet: Combination of healthy and unhealthy  Caffeine: Minimal  Calcium: Adequate  Exercise: Intermittently    PHQ-2 Depression Screening  Little interest or pleasure in doing things? 0   Feeling down, depressed, or hopeless? 0   PHQ-2 Total Score 0         Review of Systems   Constitutional: Positive for diaphoresis and fatigue. Negative for activity change, appetite change, chills, fever and unexpected weight change.   HENT:  Negative for congestion, ear pain, rhinorrhea, sinus pressure and sore throat.    Eyes: Negative for itching and visual disturbance.   Respiratory: Negative for cough, chest tightness, shortness of breath and wheezing.    Cardiovascular: Negative for chest pain, palpitations and leg swelling.   Gastrointestinal: Negative for abdominal pain, constipation, diarrhea, nausea and vomiting.        C/o bloating and hearburn   Endocrine: Negative for cold intolerance, heat intolerance, polydipsia, polyphagia and polyuria.   Genitourinary: Negative for difficulty urinating, dysuria and hematuria.   Musculoskeletal: Negative for arthralgias, back pain, joint swelling and myalgias.   Skin: Negative for color change, rash and wound.   Allergic/Immunologic: Negative for environmental allergies and food allergies.   Neurological: Negative for dizziness, syncope, weakness, light-headedness, numbness and headaches.   Hematological: Negative for adenopathy. Does not bruise/bleed easily.   Psychiatric/Behavioral: Negative for dysphoric mood and sleep disturbance. The patient is not nervous/anxious.            History  Past Medical History:   Diagnosis Date   • GERD (gastroesophageal reflux disease)    • H/O bone density study 09/2018    normal   • H/O mammogram 02/01/2017   • Hx of radiation therapy    • Migraine    • Otitis media 8/15/2016    Impression: 02/13/2015 - amoxil 875 mg bid x 10 days;    • Pap smear for cervical cancer screening 04/01/2017   • Thyroid cancer (CMS/HCC) 05/2007    radioiodine tx    • Tubular adenoma of colon 07/2019      Past Surgical History:   Procedure Laterality Date   • COLONOSCOPY  07/30/2019    Dr. Trujillo, tubular adenoma   • TONSILLECTOMY     • TOTAL THYROIDECTOMY  05/2007    Dr. FALCON   • VAGINAL DELIVERY      x1      Allergies   Allergen Reactions   • Antihistamines, Chlorpheniramine-Type Palpitations     TABS.  Elevated heart rate     • Codeine Rash      Family History   Problem Relation Age of Onset    • Diabetes type I Father    • Kidney failure Father 42   • Heart attack Maternal Grandmother 72   • Arthritis Maternal Grandmother    • Diabetes type I Daughter    • Hypertension Mother    • Arthritis Maternal Grandfather    • Arthritis Paternal Grandmother    • Arthritis Paternal Grandfather    • Heart attack Paternal Grandfather 72   • Colon cancer Paternal Grandfather    • No Known Problems Brother      Social History     Socioeconomic History   • Marital status:      Spouse name: Not on file   • Number of children: Not on file   • Years of education: Not on file   • Highest education level: Not on file   Occupational History   • Occupation: refer coord   Tobacco Use   • Smoking status: Never Smoker   • Smokeless tobacco: Never Used   Substance and Sexual Activity   • Alcohol use: No   • Drug use: No   • Sexual activity: Yes     Partners: Male     Birth control/protection: None     Comment:    Social History Narrative    Lives with spouse and daughter        Current Outpatient Medications:   •  albuterol (PROVENTIL HFA;VENTOLIN HFA) 108 (90 Base) MCG/ACT inhaler, Inhale 2 puffs Every 4 (Four) Hours As Needed for Wheezing., Disp: 1 inhaler, Rfl: 0  •  hydrOXYzine (ATARAX) 10 MG tablet, Take 1-2 pills 3 times a day if needed for allergies, Disp: 60 tablet, Rfl: 0  •  meclizine (ANTIVERT) 25 MG tablet, Take 1 tablet by mouth 3 (Three) Times a Day As Needed for dizziness., Disp: 30 tablet, Rfl: 0  •  Multiple Vitamins-Minerals (HAIR SKIN NAILS PO), Take  by mouth., Disp: , Rfl:   •  dexlansoprazole (Dexilant) 30 MG capsule, Take 1 capsule by mouth Daily for 30 days., Disp: 30 capsule, Rfl: 3  •  SYNTHROID 112 MCG tablet, Take 1 tablet by mouth Daily., Disp: 30 tablet, Rfl: 2   Immunization History   Administered Date(s) Administered   • Hepatitis A 09/13/2018, 03/13/2019   • Influenza, Unspecified 10/01/2017, 10/01/2018, 10/01/2019   • Tdap 02/14/2018   • Tetanus 01/01/2005                   /84  "  Pulse 77   Temp 98 °F (36.7 °C)   Resp 16   Ht 167.6 cm (66\")   Wt 78 kg (172 lb)   LMP 12/16/2016   SpO2 98%   BMI 27.76 kg/m²       Physical Exam   Constitutional: She is oriented to person, place, and time. She appears well-developed and well-nourished. No distress.   HENT:   Head: Normocephalic and atraumatic.   Right Ear: External ear normal.   Left Ear: External ear normal.   Nose: Nose normal.   Mouth/Throat: Oropharynx is clear and moist.   Eyes: Pupils are equal, round, and reactive to light. Conjunctivae and EOM are normal. Right eye exhibits no discharge. Left eye exhibits no discharge. No scleral icterus.   Neck: Normal range of motion. Neck supple. No JVD present. Carotid bruit is not present. No tracheal deviation present. No thyromegaly present.   Cardiovascular: Normal rate, regular rhythm, normal heart sounds and intact distal pulses. Exam reveals no gallop and no friction rub.   No murmur heard.  Pulmonary/Chest: Effort normal and breath sounds normal. No respiratory distress. She has no wheezes. She has no rales. She exhibits no tenderness. Right breast exhibits no inverted nipple, no mass, no nipple discharge, no skin change and no tenderness. Left breast exhibits no inverted nipple, no mass, no nipple discharge, no skin change and no tenderness. No breast swelling, tenderness, discharge or bleeding. Breasts are symmetrical.   Abdominal: Soft. Normal appearance and bowel sounds are normal. She exhibits no distension and no mass. There is no hepatosplenomegaly. There is no tenderness. No hernia.   Genitourinary: No breast swelling, tenderness, discharge or bleeding.   Musculoskeletal: Normal range of motion. She exhibits no edema, tenderness or deformity.   Lymphadenopathy:        Head (right side): No submental, no submandibular, no tonsillar, no preauricular, no posterior auricular and no occipital adenopathy present.        Head (left side): No submental, no submandibular, no " tonsillar, no preauricular, no posterior auricular and no occipital adenopathy present.     She has no cervical adenopathy.     She has no axillary adenopathy.   Neurological: She is alert and oriented to person, place, and time. She has normal reflexes. She displays normal reflexes.   Skin: Skin is warm and dry. No rash noted. She is not diaphoretic. No erythema. No pallor.   Psychiatric: She has a normal mood and affect. Her behavior is normal. Thought content normal.   Nursing note and vitals reviewed.                Diagnoses and all orders for this visit:    Annual physical exam  -     CBC (No Diff); Future  -     Comprehensive Metabolic Panel; Future  -     Lipid Panel; Future  -     TSH; Future  -     Vitamin B12; Future  -     Vitamin D 25 Hydroxy; Future  -     Hepatitis C Antibody; Future  -     POCT urinalysis dipstick, automated  -     T4, free; Future    Vitamin D deficiency  -     Vitamin D 25 Hydroxy; Future    Gastroesophageal reflux disease without esophagitis  -     CBC (No Diff); Future  -     Comprehensive Metabolic Panel; Future  -     Vitamin B12; Future  -     dexlansoprazole (Dexilant) 30 MG capsule; Take 1 capsule by mouth Daily for 30 days.    Postoperative hypothyroidism  -     CBC (No Diff); Future  -     Comprehensive Metabolic Panel; Future  -     TSH; Future  -     T4, free; Future  -     SYNTHROID 112 MCG tablet; Take 1 tablet by mouth Daily.  -     TSH; Future  -     T4, free; Future    Papillary carcinoma of thyroid (CMS/HCC)  -     CBC (No Diff); Future  -     Comprehensive Metabolic Panel; Future  -     TSH; Future  -     T4, free; Future    Recent Results (from the past 168 hour(s))   POCT urinalysis dipstick, automated    Collection Time: 05/20/20  4:32 PM   Result Value Ref Range    Color Yellow Yellow, Straw, Dark Yellow, Maribel    Clarity, UA Clear Clear    Specific Gravity  1.030 1.005 - 1.030    pH, Urine 5.5 5.0 - 8.0    Leukocytes Negative Negative    Nitrite, UA Negative  Negative    Protein, POC Negative Negative mg/dL    Glucose, UA Negative Negative, 1000 mg/dL (3+) mg/dL    Ketones, UA Negative Negative    Urobilinogen, UA Normal Normal    Bilirubin Negative Negative    Blood, UA Negative Negative   CBC (No Diff)    Collection Time: 05/21/20  8:12 AM   Result Value Ref Range    WBC 5.42 3.40 - 10.80 10*3/mm3    RBC 5.14 3.77 - 5.28 10*6/mm3    Hemoglobin 15.5 12.0 - 15.9 g/dL    Hematocrit 45.1 34.0 - 46.6 %    MCV 87.7 79.0 - 97.0 fL    MCH 30.2 26.6 - 33.0 pg    MCHC 34.4 31.5 - 35.7 g/dL    RDW 12.4 12.3 - 15.4 %    RDW-SD 39.5 37.0 - 54.0 fl    MPV 9.8 6.0 - 12.0 fL    Platelets 361 140 - 450 10*3/mm3   Comprehensive Metabolic Panel    Collection Time: 05/21/20  8:12 AM   Result Value Ref Range    Glucose 89 65 - 99 mg/dL    BUN 13 6 - 20 mg/dL    Creatinine 0.83 0.57 - 1.00 mg/dL    Sodium 141 136 - 145 mmol/L    Potassium 3.7 3.5 - 5.2 mmol/L    Chloride 101 98 - 107 mmol/L    CO2 26.2 22.0 - 29.0 mmol/L    Calcium 10.4 8.6 - 10.5 mg/dL    Total Protein 8.8 (H) 6.0 - 8.5 g/dL    Albumin 5.00 3.50 - 5.20 g/dL    ALT (SGPT) 17 1 - 33 U/L    AST (SGOT) 26 1 - 32 U/L    Alkaline Phosphatase 163 (H) 39 - 117 U/L    Total Bilirubin 0.6 0.2 - 1.2 mg/dL    eGFR Non African Amer 72 >60 mL/min/1.73    Globulin 3.8 gm/dL    A/G Ratio 1.3 g/dL    BUN/Creatinine Ratio 15.7 7.0 - 25.0    Anion Gap 13.8 5.0 - 15.0 mmol/L   Lipid Panel    Collection Time: 05/21/20  8:12 AM   Result Value Ref Range    Total Cholesterol 173 0 - 200 mg/dL    Triglycerides 128 0 - 150 mg/dL    HDL Cholesterol 51 40 - 60 mg/dL    LDL Cholesterol  96 0 - 100 mg/dL    VLDL Cholesterol 25.6 5 - 40 mg/dL    LDL/HDL Ratio 1.89    TSH    Collection Time: 05/21/20  8:12 AM   Result Value Ref Range    TSH 0.109 (L) 0.270 - 4.200 uIU/mL   Vitamin B12    Collection Time: 05/21/20  8:12 AM   Result Value Ref Range    Vitamin B-12 794 211 - 946 pg/mL   Vitamin D 25 Hydroxy    Collection Time: 05/21/20  8:12 AM   Result  Value Ref Range    25 Hydroxy, Vitamin D 28.6 (L) 30.0 - 100.0 ng/ml   Hepatitis C Antibody    Collection Time: 05/21/20  8:12 AM   Result Value Ref Range    Hepatitis C Ab Non-Reactive Non-Reactive   T4, free    Collection Time: 05/21/20  8:12 AM   Result Value Ref Range    Free T4 2.03 (H) 0.93 - 1.70 ng/dL            labs completed and reviewed.  Patient with an increase in her free T4.  Suspect this could be likely causing her night sweats, and hair loss and fatigue.  We will decrease her Synthroid down to 112 mcg daily and have her repeat her labs in 6 weeks.  We will change her Prevacid to Dexilant.  If symptoms not improved will consider EGD.  She should take vitamin D3 1000 over-the-counter daily.  Immunizations and screenings up-to-date as noted in HPI.  She will be due for her mammogram in August.  Her Pap smear should be up-to-date she will double check with Dr. Christensen and see the date to be sure  Bone density due in September.  Counseling: Healthy diet and exercise, thyroid symptoms    Follow up: Return in about 6 weeks (around 7/1/2020) for For labs.  Plan of care discussed with pt. They verbalized understanding and agreement.     Trisha Aggarwal, APRN   5/22/2020   10:04              * Please note that portions of this note were completed with a voice recognition program. Efforts were made to edit the dictation but occasionally words are erroneously transcribed.

## 2020-05-21 ENCOUNTER — LAB (OUTPATIENT)
Dept: LAB | Facility: HOSPITAL | Age: 50
End: 2020-05-21

## 2020-05-21 DIAGNOSIS — E55.9 VITAMIN D DEFICIENCY: ICD-10-CM

## 2020-05-21 DIAGNOSIS — C73 PAPILLARY CARCINOMA OF THYROID (HCC): ICD-10-CM

## 2020-05-21 DIAGNOSIS — Z00.00 ANNUAL PHYSICAL EXAM: ICD-10-CM

## 2020-05-21 DIAGNOSIS — E89.0 POSTOPERATIVE HYPOTHYROIDISM: ICD-10-CM

## 2020-05-21 DIAGNOSIS — K21.9 GASTROESOPHAGEAL REFLUX DISEASE WITHOUT ESOPHAGITIS: ICD-10-CM

## 2020-05-21 LAB
25(OH)D3 SERPL-MCNC: 28.6 NG/ML (ref 30–100)
ALBUMIN SERPL-MCNC: 5 G/DL (ref 3.5–5.2)
ALBUMIN/GLOB SERPL: 1.3 G/DL
ALP SERPL-CCNC: 163 U/L (ref 39–117)
ALT SERPL W P-5'-P-CCNC: 17 U/L (ref 1–33)
ANION GAP SERPL CALCULATED.3IONS-SCNC: 13.8 MMOL/L (ref 5–15)
AST SERPL-CCNC: 26 U/L (ref 1–32)
BILIRUB SERPL-MCNC: 0.6 MG/DL (ref 0.2–1.2)
BUN BLD-MCNC: 13 MG/DL (ref 6–20)
BUN/CREAT SERPL: 15.7 (ref 7–25)
CALCIUM SPEC-SCNC: 10.4 MG/DL (ref 8.6–10.5)
CHLORIDE SERPL-SCNC: 101 MMOL/L (ref 98–107)
CHOLEST SERPL-MCNC: 173 MG/DL (ref 0–200)
CO2 SERPL-SCNC: 26.2 MMOL/L (ref 22–29)
CREAT BLD-MCNC: 0.83 MG/DL (ref 0.57–1)
DEPRECATED RDW RBC AUTO: 39.5 FL (ref 37–54)
ERYTHROCYTE [DISTWIDTH] IN BLOOD BY AUTOMATED COUNT: 12.4 % (ref 12.3–15.4)
GFR SERPL CREATININE-BSD FRML MDRD: 72 ML/MIN/1.73
GLOBULIN UR ELPH-MCNC: 3.8 GM/DL
GLUCOSE BLD-MCNC: 89 MG/DL (ref 65–99)
HCT VFR BLD AUTO: 45.1 % (ref 34–46.6)
HCV AB SER DONR QL: NORMAL
HDLC SERPL-MCNC: 51 MG/DL (ref 40–60)
HGB BLD-MCNC: 15.5 G/DL (ref 12–15.9)
LDLC SERPL CALC-MCNC: 96 MG/DL (ref 0–100)
LDLC/HDLC SERPL: 1.89 {RATIO}
MCH RBC QN AUTO: 30.2 PG (ref 26.6–33)
MCHC RBC AUTO-ENTMCNC: 34.4 G/DL (ref 31.5–35.7)
MCV RBC AUTO: 87.7 FL (ref 79–97)
PLATELET # BLD AUTO: 361 10*3/MM3 (ref 140–450)
PMV BLD AUTO: 9.8 FL (ref 6–12)
POTASSIUM BLD-SCNC: 3.7 MMOL/L (ref 3.5–5.2)
PROT SERPL-MCNC: 8.8 G/DL (ref 6–8.5)
RBC # BLD AUTO: 5.14 10*6/MM3 (ref 3.77–5.28)
SODIUM BLD-SCNC: 141 MMOL/L (ref 136–145)
T4 FREE SERPL-MCNC: 2.03 NG/DL (ref 0.93–1.7)
TRIGL SERPL-MCNC: 128 MG/DL (ref 0–150)
TSH SERPL DL<=0.05 MIU/L-ACNC: 0.11 UIU/ML (ref 0.27–4.2)
VIT B12 BLD-MCNC: 794 PG/ML (ref 211–946)
VLDLC SERPL-MCNC: 25.6 MG/DL (ref 5–40)
WBC NRBC COR # BLD: 5.42 10*3/MM3 (ref 3.4–10.8)

## 2020-05-21 PROCEDURE — 84439 ASSAY OF FREE THYROXINE: CPT

## 2020-05-21 PROCEDURE — 36415 COLL VENOUS BLD VENIPUNCTURE: CPT

## 2020-05-21 PROCEDURE — 86803 HEPATITIS C AB TEST: CPT

## 2020-05-21 PROCEDURE — 82306 VITAMIN D 25 HYDROXY: CPT

## 2020-05-21 PROCEDURE — 84443 ASSAY THYROID STIM HORMONE: CPT

## 2020-05-21 PROCEDURE — 82607 VITAMIN B-12: CPT

## 2020-05-21 PROCEDURE — 80061 LIPID PANEL: CPT

## 2020-05-21 PROCEDURE — 85027 COMPLETE CBC AUTOMATED: CPT

## 2020-05-21 PROCEDURE — 80053 COMPREHEN METABOLIC PANEL: CPT

## 2020-05-22 RX ORDER — LEVOTHYROXINE SODIUM 112 MCG
112 TABLET ORAL DAILY
Qty: 30 TABLET | Refills: 2 | Status: SHIPPED | OUTPATIENT
Start: 2020-05-22 | End: 2020-08-17

## 2020-06-03 DIAGNOSIS — R06.2 WHEEZING: ICD-10-CM

## 2020-06-03 RX ORDER — ALBUTEROL SULFATE 90 UG/1
2 AEROSOL, METERED RESPIRATORY (INHALATION) EVERY 4 HOURS PRN
Qty: 18 G | Refills: 3 | Status: SHIPPED | OUTPATIENT
Start: 2020-06-03 | End: 2022-01-27 | Stop reason: SDUPTHER

## 2020-06-22 ENCOUNTER — LAB (OUTPATIENT)
Dept: INTERNAL MEDICINE | Facility: CLINIC | Age: 50
End: 2020-06-22

## 2020-06-22 DIAGNOSIS — Z20.822 EXPOSURE TO COVID-19 VIRUS: ICD-10-CM

## 2020-06-22 DIAGNOSIS — E89.0 POSTOPERATIVE HYPOTHYROIDISM: ICD-10-CM

## 2020-06-22 DIAGNOSIS — Z91.018 MULTIPLE FOOD ALLERGIES: Primary | ICD-10-CM

## 2020-06-22 DIAGNOSIS — Z91.018 MULTIPLE FOOD ALLERGIES: ICD-10-CM

## 2020-06-22 PROCEDURE — 86003 ALLG SPEC IGE CRUDE XTRC EA: CPT | Performed by: NURSE PRACTITIONER

## 2020-06-22 PROCEDURE — 86769 SARS-COV-2 COVID-19 ANTIBODY: CPT | Performed by: NURSE PRACTITIONER

## 2020-06-22 PROCEDURE — 36415 COLL VENOUS BLD VENIPUNCTURE: CPT

## 2020-06-22 NOTE — PROGRESS NOTES
Pt would like to have antibody testing for COVID due to possible previous exposures and a URI at the onset of covid pandemic. She is currently asymptomatic.     She would also like to have food allergy panel checked. Has multiple food allergies historically.     Orders placed

## 2020-06-25 LAB — SARS-COV-2 AB SERPL QL IA: NEGATIVE

## 2020-06-30 LAB
CALIF WALNUT POLN IGE QN: NORMAL
CLAM IGE QN: NORMAL
CODFISH IGE QN: NORMAL
CORN IGE QN: NORMAL
COW MILK IGE QN: NORMAL
EGG WHITE IGE QN: NORMAL
PEANUT IGE QN: NORMAL
SCALLOP IGE QN: NORMAL
SESAME SEED IGE: NORMAL
SHRIMP IGE: NORMAL
SOYBEAN IGE QN: NORMAL
WHEAT IGE QN: NORMAL

## 2020-07-01 DIAGNOSIS — Z91.018 MULTIPLE FOOD ALLERGIES: Primary | ICD-10-CM

## 2020-07-06 ENCOUNTER — HOSPITAL ENCOUNTER (OUTPATIENT)
Dept: GENERAL RADIOLOGY | Facility: HOSPITAL | Age: 50
Discharge: HOME | End: 2020-07-06
Admitting: NURSE PRACTITIONER

## 2020-07-06 ENCOUNTER — OFFICE VISIT (OUTPATIENT)
Dept: INTERNAL MEDICINE | Facility: CLINIC | Age: 50
End: 2020-07-06

## 2020-07-06 VITALS
HEART RATE: 75 BPM | TEMPERATURE: 97.8 F | WEIGHT: 172 LBS | BODY MASS INDEX: 27.64 KG/M2 | HEIGHT: 66 IN | SYSTOLIC BLOOD PRESSURE: 118 MMHG | DIASTOLIC BLOOD PRESSURE: 86 MMHG | RESPIRATION RATE: 16 BRPM | OXYGEN SATURATION: 97 %

## 2020-07-06 DIAGNOSIS — M54.42 ACUTE MIDLINE LOW BACK PAIN WITH LEFT-SIDED SCIATICA: Primary | ICD-10-CM

## 2020-07-06 DIAGNOSIS — M54.42 ACUTE MIDLINE LOW BACK PAIN WITH LEFT-SIDED SCIATICA: ICD-10-CM

## 2020-07-06 PROCEDURE — 72100 X-RAY EXAM L-S SPINE 2/3 VWS: CPT

## 2020-07-06 PROCEDURE — 99213 OFFICE O/P EST LOW 20 MIN: CPT | Performed by: NURSE PRACTITIONER

## 2020-07-06 RX ORDER — DICLOFENAC SODIUM 75 MG/1
75 TABLET, DELAYED RELEASE ORAL 2 TIMES DAILY PRN
Qty: 60 TABLET | Refills: 0 | Status: CANCELLED | OUTPATIENT
Start: 2020-07-06

## 2020-07-06 RX ORDER — METHYLPREDNISOLONE 4 MG/1
TABLET ORAL
Qty: 21 TABLET | Refills: 0 | Status: SHIPPED | OUTPATIENT
Start: 2020-07-06 | End: 2020-10-12

## 2020-07-06 RX ORDER — CYCLOBENZAPRINE HCL 5 MG
TABLET ORAL
Qty: 30 TABLET | Refills: 0 | Status: SHIPPED | OUTPATIENT
Start: 2020-07-06 | End: 2020-10-12

## 2020-07-06 NOTE — PROGRESS NOTES
"Subjective   Joyce Orona is a 50 y.o. female.     Chief Complaint   Patient presents with   • Back Pain     left lower back pain with sciatica. 2 weeks       Back Pain   This is a new problem. The current episode started 1 to 4 weeks ago (2 weeks ago ). The problem occurs constantly. Progression since onset: initially improving but worsening over the last 3 days. The pain is present in the lumbar spine. The quality of the pain is described as aching (\"a catch\"). The pain radiates to the left foot. The pain is at a severity of 8/10. The pain is the same all the time (worse as soon as she gets up in the morning). The symptoms are aggravated by bending, sitting and twisting. Pertinent negatives include no abdominal pain, bladder incontinence, bowel incontinence, chest pain, dysuria, fever, headaches, leg pain, numbness, paresis, paresthesias, pelvic pain, perianal numbness, tingling, weakness or weight loss. Risk factors include history of cancer. She has tried home exercises, heat and NSAIDs (tens) for the symptoms. The treatment provided mild relief.        The following portions of the patient's history were reviewed and updated as appropriate: allergies, current medications, past family history, past medical history, past social history, past surgical history and problem list.        Review of Systems   Constitutional: Negative for fatigue, fever and unexpected weight loss.   HENT: Negative.    Eyes: Negative for pain and visual disturbance.   Respiratory: Negative for cough and shortness of breath.    Cardiovascular: Negative for chest pain, palpitations and leg swelling.   Gastrointestinal: Negative for abdominal pain, bowel incontinence, constipation and diarrhea.   Genitourinary: Negative.  Negative for difficulty urinating, dysuria, pelvic pain and urinary incontinence.   Musculoskeletal: Positive for back pain. Negative for arthralgias, gait problem and myalgias.   Skin: Negative for color change " and rash.   Neurological: Negative for dizziness, tingling, weakness, numbness, headache and paresthesias.   Hematological: Does not bruise/bleed easily.           Outpatient Medications Marked as Taking for the 7/6/20 encounter (Office Visit) with Trisha Aggarwal APRN   Medication Sig Dispense Refill   • albuterol sulfate  (90 Base) MCG/ACT inhaler Inhale 2 puffs Every 4 (Four) Hours As Needed for Wheezing or Shortness of Air. 1 inhaler 3   • meclizine (ANTIVERT) 25 MG tablet Take 1 tablet by mouth 3 (Three) Times a Day As Needed for dizziness. 30 tablet 0   • Multiple Vitamins-Minerals (HAIR SKIN NAILS PO) Take  by mouth.     • SYNTHROID 112 MCG tablet Take 1 tablet by mouth Daily. 30 tablet 2           Objective   Physical Exam   Constitutional: She is oriented to person, place, and time. She appears well-developed and well-nourished.   HENT:   Head: Normocephalic and atraumatic.   Eyes: Pupils are equal, round, and reactive to light. Conjunctivae are normal.   Neck: Normal range of motion.   Cardiovascular: Normal rate, regular rhythm and normal heart sounds.   Pulmonary/Chest: Effort normal.   Abdominal: Soft. Normal appearance and bowel sounds are normal. There is no tenderness.   Musculoskeletal: Normal range of motion.        Lumbar back: She exhibits tenderness, bony tenderness and pain. She exhibits normal range of motion, no swelling, no edema, no deformity, no laceration, no spasm and normal pulse.        Back:    Negative SLR and XSLR, normal heel and toe walk, normal squat and raise.      Neurological: She is alert and oriented to person, place, and time. She has normal strength and normal reflexes. No cranial nerve deficit or sensory deficit.   Skin: Skin is warm and dry.   Psychiatric: She has a normal mood and affect. Her behavior is normal. Judgment and thought content normal.   Nursing note and vitals reviewed.      Vitals:    07/06/20 0827   BP: 118/86   Pulse: 75   Resp: 16   Temp: 97.8  "°F (36.6 °C)   SpO2: 97%   Weight: 78 kg (172 lb)  Comment: per pt   Height: 167.6 cm (66\")   PainSc:   8   PainLoc: Back     Body mass index is 27.76 kg/m².        Assessment/Plan       Diagnoses and all orders for this visit:    1. Acute midline low back pain with left-sided sciatica (Primary)  -     Ambulatory Referral to Physical Therapy Evaluate and treat  -     cyclobenzaprine (FLEXERIL) 5 MG tablet; Take 1/2 to 1 pill in the early evening if needed for muscle spasms and back  Dispense: 30 tablet; Refill: 0  -     methylPREDNISolone (MEDROL, PAULINO,) 4 MG tablet; Take as directed on package instructions.  Dispense: 21 tablet; Refill: 0  -     XR Spine Lumbar 2 or 3 View; Future    Stop ibuprofen, switch to tylenol   Add medrol, low dose of flexeriil at night and refer to PT>   Will check an XR due to some mild bony tenderness in lumbar region    Return if symptoms worsen or fail to improve.    I discussed my findings,recommendations, and plan of care was with the patient. They verbalized understanding and agreement.  Patient was encouraged to keep me informed of any acute changes, lack of improvement, or any new concerning symptoms.       * Please note that portions of this note were completed with a voice recognition program. Efforts were made to edit the dictation but occasionally words are erroneously transcribed.   "

## 2020-07-06 NOTE — PATIENT INSTRUCTIONS
Back Exercises  These exercises help to make your trunk and back strong. They also help to keep the lower back flexible. Doing these exercises can help to prevent back pain or lessen existing pain.  · If you have back pain, try to do these exercises 2-3 times each day or as told by your doctor.  · As you get better, do the exercises once each day. Repeat the exercises more often as told by your doctor.  · To stop back pain from coming back, do the exercises once each day, or as told by your doctor.  Exercises  Single knee to chest  Do these steps 3-5 times in a row for each le. Lie on your back on a firm bed or the floor with your legs stretched out.  2. Bring one knee to your chest.  3. Grab your knee or thigh with both hands and hold them it in place.  4. Pull on your knee until you feel a gentle stretch in your lower back or buttocks.  5. Keep doing the stretch for 10-30 seconds.  6. Slowly let go of your leg and straighten it.  Pelvic tilt  Do these steps 5-10 times in a row:  1. Lie on your back on a firm bed or the floor with your legs stretched out.  2. Bend your knees so they point up to the ceiling. Your feet should be flat on the floor.  3. Tighten your lower belly (abdomen) muscles to press your lower back against the floor. This will make your tailbone point up to the ceiling instead of pointing down to your feet or the floor.  4. Stay in this position for 5-10 seconds while you gently tighten your muscles and breathe evenly.  Cat-cow  Do these steps until your lower back bends more easily:  1. Get on your hands and knees on a firm surface. Keep your hands under your shoulders, and keep your knees under your hips. You may put padding under your knees.  2. Let your head hang down toward your chest. Tighten (contract) the muscles in your belly. Point your tailbone toward the floor so your lower back becomes rounded like the back of a cat.  3. Stay in this position for 5 seconds.  4. Slowly lift your  head. Let the muscles of your belly relax. Point your tailbone up toward the ceiling so your back forms a sagging arch like the back of a cow.  5. Stay in this position for 5 seconds.    Press-ups  Do these steps 5-10 times in a row:  1. Lie on your belly (face-down) on the floor.  2. Place your hands near your head, about shoulder-width apart.  3. While you keep your back relaxed and keep your hips on the floor, slowly straighten your arms to raise the top half of your body and lift your shoulders. Do not use your back muscles. You may change where you place your hands in order to make yourself more comfortable.  4. Stay in this position for 5 seconds.  5. Slowly return to lying flat on the floor.    Bridges  Do these steps 10 times in a row:  1. Lie on your back on a firm surface.  2. Bend your knees so they point up to the ceiling. Your feet should be flat on the floor. Your arms should be flat at your sides, next to your body.  3. Tighten your butt muscles and lift your butt off the floor until your waist is almost as high as your knees. If you do not feel the muscles working in your butt and the back of your thighs, slide your feet 1-2 inches farther away from your butt.  4. Stay in this position for 3-5 seconds.  5. Slowly lower your butt to the floor, and let your butt muscles relax.  If this exercise is too easy, try doing it with your arms crossed over your chest.  Belly crunches  Do these steps 5-10 times in a row:  1. Lie on your back on a firm bed or the floor with your legs stretched out.  2. Bend your knees so they point up to the ceiling. Your feet should be flat on the floor.  3. Cross your arms over your chest.  4. Tip your chin a little bit toward your chest but do not bend your neck.  5. Tighten your belly muscles and slowly raise your chest just enough to lift your shoulder blades a tiny bit off of the floor. Avoid raising your body higher than that, because it can put too much stress on your low  back.  6. Slowly lower your chest and your head to the floor.  Back lifts  Do these steps 5-10 times in a row:  1. Lie on your belly (face-down) with your arms at your sides, and rest your forehead on the floor.  2. Tighten the muscles in your legs and your butt.  3. Slowly lift your chest off of the floor while you keep your hips on the floor. Keep the back of your head in line with the curve in your back. Look at the floor while you do this.  4. Stay in this position for 3-5 seconds.  5. Slowly lower your chest and your face to the floor.  Contact a doctor if:  · Your back pain gets a lot worse when you do an exercise.  · Your back pain does not get better 2 hours after you exercise.  If you have any of these problems, stop doing the exercises. Do not do them again unless your doctor says it is okay.  Get help right away if:  · You have sudden, very bad back pain. If this happens, stop doing the exercises. Do not do them again unless your doctor says it is okay.  This information is not intended to replace advice given to you by your health care provider. Make sure you discuss any questions you have with your health care provider.  Document Released: 01/20/2012 Document Revised: 09/12/2019 Document Reviewed: 09/12/2019  Elsevier Patient Education © 2020 [employer]

## 2020-07-27 ENCOUNTER — LAB (OUTPATIENT)
Dept: LAB | Facility: HOSPITAL | Age: 50
End: 2020-07-27

## 2020-07-27 ENCOUNTER — LAB (OUTPATIENT)
Dept: INTERNAL MEDICINE | Facility: CLINIC | Age: 50
End: 2020-07-27

## 2020-07-27 ENCOUNTER — TELEPHONE (OUTPATIENT)
Dept: INTERNAL MEDICINE | Facility: CLINIC | Age: 50
End: 2020-07-27

## 2020-07-27 DIAGNOSIS — E89.0 POSTOPERATIVE HYPOTHYROIDISM: ICD-10-CM

## 2020-07-27 DIAGNOSIS — E89.0 POSTOPERATIVE HYPOTHYROIDISM: Primary | ICD-10-CM

## 2020-07-27 DIAGNOSIS — R53.81 MALAISE: ICD-10-CM

## 2020-07-27 DIAGNOSIS — Z91.018 MULTIPLE FOOD ALLERGIES: ICD-10-CM

## 2020-07-27 DIAGNOSIS — R53.83 FATIGUE, UNSPECIFIED TYPE: ICD-10-CM

## 2020-07-27 LAB
T4 FREE SERPL-MCNC: 1.71 NG/DL (ref 0.93–1.7)
TSH SERPL DL<=0.05 MIU/L-ACNC: 0.27 UIU/ML (ref 0.27–4.2)
TSH SERPL DL<=0.05 MIU/L-ACNC: 0.27 UIU/ML (ref 0.27–4.2)

## 2020-07-27 PROCEDURE — 84439 ASSAY OF FREE THYROXINE: CPT

## 2020-07-27 PROCEDURE — 84443 ASSAY THYROID STIM HORMONE: CPT

## 2020-07-27 PROCEDURE — 86003 ALLG SPEC IGE CRUDE XTRC EA: CPT | Performed by: NURSE PRACTITIONER

## 2020-07-27 PROCEDURE — 36415 COLL VENOUS BLD VENIPUNCTURE: CPT

## 2020-07-27 NOTE — TELEPHONE ENCOUNTER
Joyce let me know she is having some fatigue and general feeling of unwell. No fever, sore throat, cough, SOA, palpitations, CP, n/v/d...    Did  Not sleep well last night. Typically feels unwell when thyroid levels are abnormal.   Had a dose increase of synthroid about 7 weeks ago due to higt tsh.   Not missing any doses

## 2020-07-30 LAB
CALIF WALNUT POLN IGE QN: <0.1 KU/L
CLAM IGE QN: <0.1 KU/L
CODFISH IGE QN: <0.1 KU/L
CONV CLASS DESCRIPTION: NORMAL
CORN IGE QN: <0.1 KU/L
COW MILK IGE QN: <0.1 KU/L
EGG WHITE IGE QN: <0.1 KU/L
PEANUT IGE QN: <0.1 KU/L
SCALLOP IGE QN: <0.1 KU/L
SESAME SEED IGE: <0.1 KU/L
SHRIMP IGE: <0.1 KU/L
SOYBEAN IGE QN: <0.1 KU/L
WHEAT IGE QN: <0.1 KU/L

## 2020-08-17 DIAGNOSIS — E89.0 POSTOPERATIVE HYPOTHYROIDISM: ICD-10-CM

## 2020-08-17 RX ORDER — LEVOTHYROXINE SODIUM 112 MCG
TABLET ORAL
Qty: 30 TABLET | Refills: 1 | Status: SHIPPED | OUTPATIENT
Start: 2020-08-17 | End: 2020-10-13 | Stop reason: SDUPTHER

## 2020-10-12 ENCOUNTER — LAB (OUTPATIENT)
Dept: LAB | Facility: HOSPITAL | Age: 50
End: 2020-10-12

## 2020-10-12 ENCOUNTER — OFFICE VISIT (OUTPATIENT)
Dept: INTERNAL MEDICINE | Facility: CLINIC | Age: 50
End: 2020-10-12

## 2020-10-12 VITALS
OXYGEN SATURATION: 98 % | BODY MASS INDEX: 27.16 KG/M2 | DIASTOLIC BLOOD PRESSURE: 84 MMHG | HEIGHT: 66 IN | SYSTOLIC BLOOD PRESSURE: 122 MMHG | HEART RATE: 78 BPM | TEMPERATURE: 97.4 F | RESPIRATION RATE: 15 BRPM | WEIGHT: 169 LBS

## 2020-10-12 DIAGNOSIS — H65.02 NON-RECURRENT ACUTE SEROUS OTITIS MEDIA OF LEFT EAR: ICD-10-CM

## 2020-10-12 DIAGNOSIS — R59.0 POSTERIOR CERVICAL LYMPHADENOPATHY: ICD-10-CM

## 2020-10-12 DIAGNOSIS — E89.0 POSTOPERATIVE HYPOTHYROIDISM: Primary | ICD-10-CM

## 2020-10-12 DIAGNOSIS — R53.83 FATIGUE, UNSPECIFIED TYPE: ICD-10-CM

## 2020-10-12 DIAGNOSIS — B02.9 HERPES ZOSTER WITHOUT COMPLICATION: ICD-10-CM

## 2020-10-12 DIAGNOSIS — E89.0 POSTOPERATIVE HYPOTHYROIDISM: ICD-10-CM

## 2020-10-12 LAB
BASOPHILS # BLD AUTO: 0.05 10*3/MM3 (ref 0–0.2)
BASOPHILS NFR BLD AUTO: 0.8 % (ref 0–1.5)
DEPRECATED RDW RBC AUTO: 39.3 FL (ref 37–54)
EOSINOPHIL # BLD AUTO: 0.21 10*3/MM3 (ref 0–0.4)
EOSINOPHIL NFR BLD AUTO: 3.2 % (ref 0.3–6.2)
ERYTHROCYTE [DISTWIDTH] IN BLOOD BY AUTOMATED COUNT: 12.4 % (ref 12.3–15.4)
HCT VFR BLD AUTO: 42.3 % (ref 34–46.6)
HETEROPH AB SER QL LA: NEGATIVE
HGB BLD-MCNC: 14.3 G/DL (ref 12–15.9)
IMM GRANULOCYTES # BLD AUTO: 0.02 10*3/MM3 (ref 0–0.05)
IMM GRANULOCYTES NFR BLD AUTO: 0.3 % (ref 0–0.5)
LYMPHOCYTES # BLD AUTO: 2.12 10*3/MM3 (ref 0.7–3.1)
LYMPHOCYTES NFR BLD AUTO: 32.7 % (ref 19.6–45.3)
MCH RBC QN AUTO: 29.7 PG (ref 26.6–33)
MCHC RBC AUTO-ENTMCNC: 33.8 G/DL (ref 31.5–35.7)
MCV RBC AUTO: 87.8 FL (ref 79–97)
MONOCYTES # BLD AUTO: 0.38 10*3/MM3 (ref 0.1–0.9)
MONOCYTES NFR BLD AUTO: 5.9 % (ref 5–12)
NEUTROPHILS NFR BLD AUTO: 3.71 10*3/MM3 (ref 1.7–7)
NEUTROPHILS NFR BLD AUTO: 57.1 % (ref 42.7–76)
NRBC BLD AUTO-RTO: 0 /100 WBC (ref 0–0.2)
PLATELET # BLD AUTO: 351 10*3/MM3 (ref 140–450)
PMV BLD AUTO: 9.7 FL (ref 6–12)
RBC # BLD AUTO: 4.82 10*6/MM3 (ref 3.77–5.28)
WBC # BLD AUTO: 6.49 10*3/MM3 (ref 3.4–10.8)

## 2020-10-12 PROCEDURE — 86308 HETEROPHILE ANTIBODY SCREEN: CPT

## 2020-10-12 PROCEDURE — 99214 OFFICE O/P EST MOD 30 MIN: CPT | Performed by: NURSE PRACTITIONER

## 2020-10-12 PROCEDURE — 84443 ASSAY THYROID STIM HORMONE: CPT

## 2020-10-12 PROCEDURE — 85025 COMPLETE CBC W/AUTO DIFF WBC: CPT

## 2020-10-12 PROCEDURE — 84439 ASSAY OF FREE THYROXINE: CPT

## 2020-10-12 RX ORDER — CEFUROXIME AXETIL 250 MG/1
250 TABLET ORAL 2 TIMES DAILY
Qty: 20 TABLET | Refills: 0 | Status: SHIPPED | OUTPATIENT
Start: 2020-10-12 | End: 2021-01-26

## 2020-10-12 NOTE — PROGRESS NOTES
Subjective   Joyce Orona is a 50 y.o. female.     Chief Complaint   Patient presents with   • lump on neck     C/o sore throat on left side x 2-3 weeks   • Earache       Earache   There is pain in the left ear. This is a new problem. The current episode started in the past 7 days. The problem occurs constantly. The problem has been unchanged. There has been no fever. The pain is mild. Associated symptoms include headaches, neck pain and a sore throat. Pertinent negatives include no abdominal pain, coughing, diarrhea, hearing loss, rash, rhinorrhea or vomiting. She has tried nothing for the symptoms. The treatment provided no relief. There is no history of a chronic ear infection or hearing loss.      States that he has been having a mild soreness on the left side of her neck and a lump on the occipital region for the last 2 to 3 weeks.  The area is tender, and swollen.  She reports this started after she went to the dentist 3 weeks ago and the next day her neck was sore.  About 2 days later she noticed a knot at the occipital area.  It is getting bigger and is causing pain to radiate down her neck. She reports it almost feels as though it is bruised.  She cannot touch that side or lie on that side to sleep because the area is painful  Area has been slightly pruritic at times.  Does not burn but has a tingling sensation every now and then.    She also has postsurgical hypothyroidism.  She is been experiencing some more sweats recently than typical.  This is 1 of her first symptoms when her Synthroid dosing needs adjusting so she would like to go ahead and check this again today.      The following portions of the patient's history were reviewed and updated as appropriate: allergies, current medications, past family history, past medical history, past social history, past surgical history and problem list.        Review of Systems   Constitutional: Positive for diaphoresis and fatigue. Negative for activity  change, appetite change, chills, fever, unexpected weight gain and unexpected weight loss.   HENT: Positive for ear pain and sore throat. Negative for hearing loss, rhinorrhea and voice change.    Eyes: Negative for pain and visual disturbance.   Respiratory: Negative for cough and shortness of breath.    Cardiovascular: Negative for chest pain, palpitations and leg swelling.   Gastrointestinal: Negative for abdominal pain, constipation, diarrhea and vomiting.   Endocrine: Negative for cold intolerance and heat intolerance.   Genitourinary: Negative.  Negative for difficulty urinating.   Musculoskeletal: Positive for myalgias and neck pain. Negative for arthralgias.   Skin: Negative for color change, dry skin and rash.   Neurological: Negative for dizziness, weakness, headache and confusion.   Hematological: Positive for adenopathy. Does not bruise/bleed easily.   Psychiatric/Behavioral: Negative for decreased concentration. The patient is not nervous/anxious.            Outpatient Medications Marked as Taking for the 10/12/20 encounter (Office Visit) with Trisha Aggarwal APRN   Medication Sig Dispense Refill   • albuterol sulfate  (90 Base) MCG/ACT inhaler Inhale 2 puffs Every 4 (Four) Hours As Needed for Wheezing or Shortness of Air. 1 inhaler 3   • [DISCONTINUED] SYNTHROID 112 MCG tablet TAKE ONE TABLET BY MOUTH DAILY 30 tablet 1     Allergies   Allergen Reactions   • Antihistamines, Chlorpheniramine-Type Palpitations     TABS.  Elevated heart rate     • Codeine Rash           Objective   Physical Exam  Constitutional:       Appearance: Normal appearance. She is well-developed.   HENT:      Head: Normocephalic and atraumatic.      Left Ear: No decreased hearing noted. Swelling present. No laceration or drainage. A middle ear effusion is present. Tympanic membrane is erythematous. Tympanic membrane is not perforated or retracted. Tympanic membrane has normal mobility.   Eyes:      Conjunctiva/sclera:  "Conjunctivae normal.      Pupils: Pupils are equal, round, and reactive to light.   Neck:      Musculoskeletal: Normal range of motion. Pain with movement and muscular tenderness present. No erythema, neck rigidity, crepitus or spinous process tenderness.     Cardiovascular:      Rate and Rhythm: Normal rate and regular rhythm.      Heart sounds: Normal heart sounds.   Pulmonary:      Effort: Pulmonary effort is normal.      Breath sounds: Normal breath sounds.   Abdominal:      General: Bowel sounds are normal.      Palpations: Abdomen is soft.      Tenderness: There is no abdominal tenderness.   Musculoskeletal: Normal range of motion.   Skin:     General: Skin is warm and dry.   Neurological:      Mental Status: She is alert and oriented to person, place, and time.   Psychiatric:         Behavior: Behavior normal.         Thought Content: Thought content normal.         Judgment: Judgment normal.         Vitals:    10/12/20 1145   BP: 122/84   Pulse: 78   Resp: 15   Temp: 97.4 °F (36.3 °C)   SpO2: 98%   Weight: 76.7 kg (169 lb)   Height: 167.6 cm (66\")     Body mass index is 27.28 kg/m².  Wt Readings from Last 3 Encounters:   10/12/20 76.7 kg (169 lb)   07/06/20 78 kg (172 lb)   05/20/20 78 kg (172 lb)             Assessment/Plan       Diagnoses and all orders for this visit:    1. Postoperative hypothyroidism (Primary)  -     CBC & Differential; Future  -     TSH; Future  -     T4, Free; Future    2. Fatigue, unspecified type  -     CBC & Differential; Future  -     Mononucleosis Screen; Future    3. Posterior cervical lymphadenopathy  -     CBC & Differential; Future  -     Mononucleosis Screen; Future  -     cefuroxime (Ceftin) 250 MG tablet; Take 1 tablet by mouth 2 (Two) Times a Day.  Dispense: 20 tablet; Refill: 0    4. Non-recurrent acute serous otitis media of left ear  -     CBC & Differential; Future  -     cefuroxime (Ceftin) 250 MG tablet; Take 1 tablet by mouth 2 (Two) Times a Day.  Dispense: 20 " tablet; Refill: 0    5. Herpes zoster without complication  -     valACYclovir (VALTREX) 1000 MG tablet; Take 1 tablet by mouth 3 (Three) Times a Day for 7 days.  Dispense: 21 tablet; Refill: 0    Labs in acceptable;le ranges.  We will continue current dose of Synthroid.  Mono screening was negative.  We will go ahead and cover this with Ceftin as well as Valtrex as she does have some symptoms consistent with left otitis as well as the tingling sensation appears to be possible prodrome for shingles.  Discussed the plan with her and she agrees to proceed with both medications.  Encourage rest and fluids.  May use heat as well precautions to avoid burns.    Recent Results (from the past 168 hour(s))   TSH    Collection Time: 10/12/20  1:24 PM    Specimen: Blood   Result Value Ref Range    TSH 0.165 (L) 0.270 - 4.200 uIU/mL   T4, Free    Collection Time: 10/12/20  1:24 PM    Specimen: Blood   Result Value Ref Range    Free T4 1.56 0.93 - 1.70 ng/dL   Mononucleosis Screen    Collection Time: 10/12/20  1:24 PM    Specimen: Blood   Result Value Ref Range    Monospot Negative Negative   CBC Auto Differential    Collection Time: 10/12/20  1:24 PM    Specimen: Blood   Result Value Ref Range    WBC 6.49 3.40 - 10.80 10*3/mm3    RBC 4.82 3.77 - 5.28 10*6/mm3    Hemoglobin 14.3 12.0 - 15.9 g/dL    Hematocrit 42.3 34.0 - 46.6 %    MCV 87.8 79.0 - 97.0 fL    MCH 29.7 26.6 - 33.0 pg    MCHC 33.8 31.5 - 35.7 g/dL    RDW 12.4 12.3 - 15.4 %    RDW-SD 39.3 37.0 - 54.0 fl    MPV 9.7 6.0 - 12.0 fL    Platelets 351 140 - 450 10*3/mm3    Neutrophil % 57.1 42.7 - 76.0 %    Lymphocyte % 32.7 19.6 - 45.3 %    Monocyte % 5.9 5.0 - 12.0 %    Eosinophil % 3.2 0.3 - 6.2 %    Basophil % 0.8 0.0 - 1.5 %    Immature Grans % 0.3 0.0 - 0.5 %    Neutrophils, Absolute 3.71 1.70 - 7.00 10*3/mm3    Lymphocytes, Absolute 2.12 0.70 - 3.10 10*3/mm3    Monocytes, Absolute 0.38 0.10 - 0.90 10*3/mm3    Eosinophils, Absolute 0.21 0.00 - 0.40 10*3/mm3     Basophils, Absolute 0.05 0.00 - 0.20 10*3/mm3    Immature Grans, Absolute 0.02 0.00 - 0.05 10*3/mm3    nRBC 0.0 0.0 - 0.2 /100 WBC         Return if symptoms worsen or fail to improve.    I discussed my findings,recommendations, and plan of care was with the patient. They verbalized understanding and agreement.  Patient was encouraged to keep me informed of any acute changes, lack of improvement, or any new concerning symptoms.       * Please note that portions of this note were completed with a voice recognition program. Efforts were made to edit the dictation but occasionally words are erroneously transcribed.

## 2020-10-13 DIAGNOSIS — E89.0 POSTOPERATIVE HYPOTHYROIDISM: ICD-10-CM

## 2020-10-13 LAB
T4 FREE SERPL-MCNC: 1.56 NG/DL (ref 0.93–1.7)
TSH SERPL DL<=0.05 MIU/L-ACNC: 0.17 UIU/ML (ref 0.27–4.2)

## 2020-10-13 RX ORDER — VALACYCLOVIR HYDROCHLORIDE 1 G/1
1000 TABLET, FILM COATED ORAL 3 TIMES DAILY
Qty: 21 TABLET | Refills: 0 | Status: SHIPPED | OUTPATIENT
Start: 2020-10-13 | End: 2020-10-20

## 2020-10-13 RX ORDER — LEVOTHYROXINE SODIUM 112 MCG
112 TABLET ORAL DAILY
Qty: 30 TABLET | Refills: 6 | Status: SHIPPED | OUTPATIENT
Start: 2020-10-13 | End: 2021-05-18 | Stop reason: ALTCHOICE

## 2020-10-14 DIAGNOSIS — E89.0 POSTOPERATIVE HYPOTHYROIDISM: ICD-10-CM

## 2020-10-14 RX ORDER — LEVOTHYROXINE SODIUM 112 MCG
TABLET ORAL
Qty: 30 TABLET | Refills: 0 | OUTPATIENT
Start: 2020-10-14

## 2020-10-15 ENCOUNTER — TRANSCRIBE ORDERS (OUTPATIENT)
Dept: ADMINISTRATIVE | Facility: HOSPITAL | Age: 50
End: 2020-10-15

## 2020-10-15 DIAGNOSIS — Z12.31 VISIT FOR SCREENING MAMMOGRAM: Primary | ICD-10-CM

## 2020-10-16 DIAGNOSIS — B02.9 HERPES ZOSTER WITHOUT COMPLICATION: ICD-10-CM

## 2020-10-16 RX ORDER — VALACYCLOVIR HYDROCHLORIDE 1 G/1
TABLET, FILM COATED ORAL
Qty: 21 TABLET | Refills: 0 | OUTPATIENT
Start: 2020-10-16

## 2020-10-17 ENCOUNTER — HOSPITAL ENCOUNTER (OUTPATIENT)
Dept: MAMMOGRAPHY | Facility: HOSPITAL | Age: 50
Discharge: HOME | End: 2020-10-17
Admitting: NURSE PRACTITIONER

## 2020-10-17 DIAGNOSIS — Z12.31 VISIT FOR SCREENING MAMMOGRAM: ICD-10-CM

## 2020-10-17 PROCEDURE — 77063 BREAST TOMOSYNTHESIS BI: CPT

## 2020-10-17 PROCEDURE — 77063 BREAST TOMOSYNTHESIS BI: CPT | Performed by: RADIOLOGY

## 2020-10-17 PROCEDURE — 77067 SCR MAMMO BI INCL CAD: CPT

## 2020-10-17 PROCEDURE — 77067 SCR MAMMO BI INCL CAD: CPT | Performed by: RADIOLOGY

## 2021-01-15 ENCOUNTER — IMMUNIZATION (OUTPATIENT)
Dept: VACCINE CLINIC | Facility: HOSPITAL | Age: 51
End: 2021-01-15

## 2021-01-15 PROCEDURE — 0001A: CPT | Performed by: INTERNAL MEDICINE

## 2021-01-15 PROCEDURE — 91300 HC SARSCOV02 VAC 30MCG/0.3ML IM: CPT | Performed by: INTERNAL MEDICINE

## 2021-01-15 PROCEDURE — 0002A: CPT | Performed by: INTERNAL MEDICINE

## 2021-01-26 ENCOUNTER — OFFICE VISIT (OUTPATIENT)
Dept: INTERNAL MEDICINE | Facility: CLINIC | Age: 51
End: 2021-01-26

## 2021-01-26 ENCOUNTER — HOSPITAL ENCOUNTER (OUTPATIENT)
Dept: ULTRASOUND IMAGING | Facility: HOSPITAL | Age: 51
Discharge: HOME | End: 2021-01-26
Admitting: NURSE PRACTITIONER

## 2021-01-26 VITALS
RESPIRATION RATE: 16 BRPM | TEMPERATURE: 98.4 F | DIASTOLIC BLOOD PRESSURE: 84 MMHG | HEIGHT: 66 IN | BODY MASS INDEX: 27.64 KG/M2 | SYSTOLIC BLOOD PRESSURE: 116 MMHG | OXYGEN SATURATION: 98 % | WEIGHT: 172 LBS | HEART RATE: 83 BPM

## 2021-01-26 DIAGNOSIS — R59.0 OCCIPITAL LYMPHADENOPATHY: ICD-10-CM

## 2021-01-26 DIAGNOSIS — Z85.850 HISTORY OF THYROID CANCER: ICD-10-CM

## 2021-01-26 DIAGNOSIS — R59.0 OCCIPITAL LYMPHADENOPATHY: Primary | ICD-10-CM

## 2021-01-26 PROBLEM — Z78.0 MENOPAUSE: Chronic | Status: ACTIVE | Noted: 2018-02-17

## 2021-01-26 PROCEDURE — 99213 OFFICE O/P EST LOW 20 MIN: CPT | Performed by: NURSE PRACTITIONER

## 2021-01-26 PROCEDURE — 76536 US EXAM OF HEAD AND NECK: CPT

## 2021-01-26 RX ORDER — MULTIPLE VITAMINS W/ MINERALS TAB 9MG-400MCG
1 TAB ORAL DAILY
COMMUNITY
End: 2021-04-26

## 2021-01-26 NOTE — PROGRESS NOTES
Chief Complaint  Mass    Subjective          Joyce Orona presents to [facility] for   History of Present Illness  Joyce is here today with complaints of a mass to the right posterior auricular/occipital region.  Area is raised, and tender.  She denies any scalp lesions.  Had similar issue in 10/2020. Took ceftin for acute otitis media and antiviral as shingles was suspected due to burning/tingling sensation on the scalp.  Had no rash.  Had a CBC at that time that was unremarkable.  She reports the raised area nearly resolved but is worse this week.  She did not notice that much yesterday morning but when she got to work she ran her hand across her neck/head and noted how tender the area was  Left ear feels muffled frequently  Went to dentist recently. Had xrays to be sure crown on that side was ok. - got good checkup.   Feels like allergies are a bit more active.   Wakes up at night with very painful neck.   No fever or chills.  She does have a past medical history of thyroid cancer.        Allergies   Allergen Reactions   • Antihistamines, Chlorpheniramine-Type Palpitations     TABS.  Elevated heart rate     • Codeine Rash     Current Outpatient Medications on File Prior to Visit   Medication Sig Dispense Refill   • albuterol sulfate  (90 Base) MCG/ACT inhaler Inhale 2 puffs Every 4 (Four) Hours As Needed for Wheezing or Shortness of Air. 18 g 3   • lansoprazole (PREVACID) 30 MG capsule Take 1 capsule by mouth Daily. 90 capsule 3   • meclizine (ANTIVERT) 25 MG tablet Take 1 tablet by mouth 3 (Three) Times a Day As Needed for dizziness. 30 tablet 0   • multivitamin with minerals (CENTRUM ADULTS PO) Take 1 tablet by mouth Daily.     • Synthroid 112 MCG tablet Take 1 tablet by mouth Daily. 30 tablet 6     No current facility-administered medications on file prior to visit.          The following portions of the patient's history were reviewed and updated as  "appropriate: allergies, current medications, past family history, past medical history, past social history, past surgical history and problem list and are available for review within electronic record    Objective     Vital Signs:   /84   Pulse 83   Temp 98.4 °F (36.9 °C)   Resp 16   Ht 167.6 cm (66\")   Wt 78 kg (172 lb) Comment: per pt  SpO2 98%   BMI 27.76 kg/m²         Physical Exam  Constitutional:       Appearance: Normal appearance. She is well-developed and well-groomed. She is not ill-appearing, toxic-appearing or diaphoretic.   HENT:      Head: Normocephalic and atraumatic. Mass present. No abrasion, contusion or laceration. Hair is normal.      Salivary Glands: Right salivary gland is not diffusely enlarged. Left salivary gland is not diffusely enlarged.        Right Ear: Tympanic membrane, ear canal and external ear normal.      Left Ear: Tympanic membrane, ear canal and external ear normal.   Eyes:      Conjunctiva/sclera: Conjunctivae normal.   Neck:      Musculoskeletal: Normal range of motion. Normal range of motion. No edema, erythema, neck rigidity, crepitus, injury, pain with movement, torticollis, spinous process tenderness or muscular tenderness.      Thyroid: No thyroid mass.      Trachea: Trachea normal.   Cardiovascular:      Rate and Rhythm: Normal rate.   Pulmonary:      Effort: Pulmonary effort is normal.   Abdominal:      General: Abdomen is flat.      Palpations: Abdomen is soft.   Musculoskeletal: Normal range of motion.   Lymphadenopathy:      Head:      Right side of head: Occipital adenopathy present. No submental, submandibular, tonsillar, preauricular or posterior auricular adenopathy.      Left side of head: No submental, submandibular, tonsillar, preauricular, posterior auricular or occipital adenopathy.      Cervical: No cervical adenopathy.   Skin:     General: Skin is warm and dry.   Neurological:      Mental Status: She is alert and oriented to person, place, and " time.   Psychiatric:         Behavior: Behavior normal. Behavior is cooperative.         Thought Content: Thought content normal.         Judgment: Judgment normal.          Result Review :                         Assessment and Plan      Diagnoses and all orders for this visit:    1. Occipital lymphadenopathy (Primary)  Assessment & Plan:  Will check US. Can consider abx. Discussed with her and we will hold off ofr now and check US first.     Orders:  -     US Head Neck Soft Tissue; Future  -     CBC & Differential; Future    2. History of thyroid cancer  -     TSH; Future  -     T4, Free; Future      US showed multiple hypoechoic areas with morphological appearance of enlarged lymph nodes measuring up to 1.9 cm in the periauricular/postauricular region.  We will likely repeat ultrasound and about 6 weeks if persists.    Follow Up     Patient was given instructions and counseling regarding her condition or for health maintenance advice. Please see specific information pulled into the AVS if appropriate.   Any new medication's adverse effects have been discussed in detail with patient  Patient was encouraged to keep me informed of any acute changes, lack of improvement, or any new concerning symptoms.    Return for will call with results.    * Please note that portions of this note were completed with a voice recognition program. Efforts were made to edit the dictation but occasionally words are erroneously transcribed.

## 2021-01-26 NOTE — ASSESSMENT & PLAN NOTE
Will check US. Can consider abx. Discussed with her and we will hold off ofr now and check US first.

## 2021-01-27 ENCOUNTER — LAB (OUTPATIENT)
Dept: LAB | Facility: HOSPITAL | Age: 51
End: 2021-01-27

## 2021-01-27 DIAGNOSIS — R59.0 OCCIPITAL LYMPHADENOPATHY: ICD-10-CM

## 2021-01-27 DIAGNOSIS — Z85.850 HISTORY OF THYROID CANCER: ICD-10-CM

## 2021-01-27 LAB
BASOPHILS # BLD AUTO: 0.03 10*3/MM3 (ref 0–0.2)
BASOPHILS NFR BLD AUTO: 0.5 % (ref 0–1.5)
DEPRECATED RDW RBC AUTO: 41.7 FL (ref 37–54)
EOSINOPHIL # BLD AUTO: 0.19 10*3/MM3 (ref 0–0.4)
EOSINOPHIL NFR BLD AUTO: 3.5 % (ref 0.3–6.2)
ERYTHROCYTE [DISTWIDTH] IN BLOOD BY AUTOMATED COUNT: 12.8 % (ref 12.3–15.4)
HCT VFR BLD AUTO: 45.2 % (ref 34–46.6)
HGB BLD-MCNC: 14.8 G/DL (ref 12–15.9)
IMM GRANULOCYTES # BLD AUTO: 0.01 10*3/MM3 (ref 0–0.05)
IMM GRANULOCYTES NFR BLD AUTO: 0.2 % (ref 0–0.5)
LYMPHOCYTES # BLD AUTO: 1.76 10*3/MM3 (ref 0.7–3.1)
LYMPHOCYTES NFR BLD AUTO: 32 % (ref 19.6–45.3)
MCH RBC QN AUTO: 29.3 PG (ref 26.6–33)
MCHC RBC AUTO-ENTMCNC: 32.7 G/DL (ref 31.5–35.7)
MCV RBC AUTO: 89.5 FL (ref 79–97)
MONOCYTES # BLD AUTO: 0.48 10*3/MM3 (ref 0.1–0.9)
MONOCYTES NFR BLD AUTO: 8.7 % (ref 5–12)
NEUTROPHILS NFR BLD AUTO: 3.03 10*3/MM3 (ref 1.7–7)
NEUTROPHILS NFR BLD AUTO: 55.1 % (ref 42.7–76)
NRBC BLD AUTO-RTO: 0 /100 WBC (ref 0–0.2)
PLATELET # BLD AUTO: 351 10*3/MM3 (ref 140–450)
PMV BLD AUTO: 9.5 FL (ref 6–12)
RBC # BLD AUTO: 5.05 10*6/MM3 (ref 3.77–5.28)
T4 FREE SERPL-MCNC: 1.88 NG/DL (ref 0.93–1.7)
TSH SERPL DL<=0.05 MIU/L-ACNC: 0.39 UIU/ML (ref 0.27–4.2)
WBC # BLD AUTO: 5.5 10*3/MM3 (ref 3.4–10.8)

## 2021-01-27 PROCEDURE — 84443 ASSAY THYROID STIM HORMONE: CPT | Performed by: NURSE PRACTITIONER

## 2021-01-27 PROCEDURE — 84439 ASSAY OF FREE THYROXINE: CPT | Performed by: NURSE PRACTITIONER

## 2021-01-27 PROCEDURE — 85025 COMPLETE CBC W/AUTO DIFF WBC: CPT | Performed by: NURSE PRACTITIONER

## 2021-01-27 NOTE — PROGRESS NOTES
Us discussed with JOE Falk and Reviewed with Joyce, we will check CBC and can repeat US in 6 weeks if persists

## 2021-02-05 ENCOUNTER — IMMUNIZATION (OUTPATIENT)
Dept: VACCINE CLINIC | Facility: HOSPITAL | Age: 51
End: 2021-02-05

## 2021-02-05 PROCEDURE — 0002A: CPT | Performed by: INTERNAL MEDICINE

## 2021-02-05 PROCEDURE — 91300 HC SARSCOV02 VAC 30MCG/0.3ML IM: CPT | Performed by: INTERNAL MEDICINE

## 2021-03-30 RX ORDER — PANTOPRAZOLE SODIUM 40 MG/1
40 TABLET, DELAYED RELEASE ORAL DAILY
Qty: 30 TABLET | Refills: 3 | Status: SHIPPED | OUTPATIENT
Start: 2021-03-30 | End: 2021-07-26 | Stop reason: SDUPTHER

## 2021-03-30 NOTE — PROGRESS NOTES
Joyce called and reported that her Prevacid is no longer working for her heartburn since her pharmacy switched formularies.  She is requesting just changed over to Protonix.

## 2021-04-01 ENCOUNTER — LAB (OUTPATIENT)
Dept: LAB | Facility: HOSPITAL | Age: 51
End: 2021-04-01

## 2021-04-01 ENCOUNTER — DOCUMENTATION (OUTPATIENT)
Dept: INTERNAL MEDICINE | Facility: CLINIC | Age: 51
End: 2021-04-01

## 2021-04-01 DIAGNOSIS — E89.0 POSTOPERATIVE HYPOTHYROIDISM: ICD-10-CM

## 2021-04-01 DIAGNOSIS — E89.0 POSTOPERATIVE HYPOTHYROIDISM: Primary | ICD-10-CM

## 2021-04-01 LAB
T4 FREE SERPL-MCNC: 1.56 NG/DL (ref 0.93–1.7)
TSH SERPL DL<=0.05 MIU/L-ACNC: 0.45 UIU/ML (ref 0.27–4.2)

## 2021-04-01 PROCEDURE — 84439 ASSAY OF FREE THYROXINE: CPT | Performed by: NURSE PRACTITIONER

## 2021-04-01 PROCEDURE — 84443 ASSAY THYROID STIM HORMONE: CPT | Performed by: NURSE PRACTITIONER

## 2021-04-02 NOTE — PROGRESS NOTES
Joyce contacted me requesting thyroid labs stating that she is feeling a bit jittery.  Orders placed.

## 2021-04-26 ENCOUNTER — OFFICE VISIT (OUTPATIENT)
Dept: INTERNAL MEDICINE | Facility: CLINIC | Age: 51
End: 2021-04-26

## 2021-04-26 VITALS
HEIGHT: 66 IN | BODY MASS INDEX: 27.97 KG/M2 | HEART RATE: 89 BPM | SYSTOLIC BLOOD PRESSURE: 116 MMHG | WEIGHT: 174 LBS | TEMPERATURE: 97.5 F | DIASTOLIC BLOOD PRESSURE: 76 MMHG | RESPIRATION RATE: 16 BRPM | OXYGEN SATURATION: 98 %

## 2021-04-26 DIAGNOSIS — Z56.6 WORK-RELATED STRESS: ICD-10-CM

## 2021-04-26 DIAGNOSIS — K21.9 GASTROESOPHAGEAL REFLUX DISEASE WITHOUT ESOPHAGITIS: ICD-10-CM

## 2021-04-26 DIAGNOSIS — E89.0 POSTOPERATIVE HYPOTHYROIDISM: Primary | Chronic | ICD-10-CM

## 2021-04-26 DIAGNOSIS — Z85.850 HISTORY OF THYROID CANCER: ICD-10-CM

## 2021-04-26 DIAGNOSIS — Z78.0 POSTMENOPAUSAL: Chronic | ICD-10-CM

## 2021-04-26 PROCEDURE — 99214 OFFICE O/P EST MOD 30 MIN: CPT | Performed by: NURSE PRACTITIONER

## 2021-04-26 SDOH — HEALTH STABILITY - MENTAL HEALTH: OTHER PHYSICAL AND MENTAL STRAIN RELATED TO WORK: Z56.6

## 2021-05-03 PROBLEM — R59.0 OCCIPITAL LYMPHADENOPATHY: Status: RESOLVED | Noted: 2021-01-26 | Resolved: 2021-05-03

## 2021-05-03 NOTE — ASSESSMENT & PLAN NOTE
Last ultrasound in 2019 showed no recurrence of her papillary thyroid carcinoma.  She is due for repeat ultrasound so we will go ahead and order that as well as checking her thyroglobulin.

## 2021-05-03 NOTE — ASSESSMENT & PLAN NOTE
Her last TSH was stable with normal free T4 although could benefit from being slightly more suppressed.  I am reluctant to increase her dose of her Synthroid due to her anxiousness and jitteriness she experiences.  We will watch this closely and check thyroid ultrasound.

## 2021-05-07 ENCOUNTER — HOSPITAL ENCOUNTER (OUTPATIENT)
Dept: ULTRASOUND IMAGING | Facility: HOSPITAL | Age: 51
Discharge: HOME | End: 2021-05-07
Admitting: NURSE PRACTITIONER

## 2021-05-07 DIAGNOSIS — E89.0 POSTOPERATIVE HYPOTHYROIDISM: Primary | ICD-10-CM

## 2021-05-07 DIAGNOSIS — Z85.850 HISTORY OF THYROID CANCER: ICD-10-CM

## 2021-05-07 DIAGNOSIS — E89.0 POSTOPERATIVE HYPOTHYROIDISM: Chronic | ICD-10-CM

## 2021-05-07 DIAGNOSIS — R93.89 ABNORMAL THYROID ULTRASOUND: ICD-10-CM

## 2021-05-07 DIAGNOSIS — E89.0 POSTOPERATIVE HYPOTHYROIDISM: ICD-10-CM

## 2021-05-07 PROCEDURE — 76536 US EXAM OF HEAD AND NECK: CPT

## 2021-05-07 RX ORDER — LEVOTHYROXINE SODIUM 0.12 MG/1
125 TABLET ORAL DAILY
Qty: 30 TABLET | Refills: 6 | Status: SHIPPED | OUTPATIENT
Start: 2021-05-07 | End: 2021-10-12 | Stop reason: SDUPTHER

## 2021-05-10 DIAGNOSIS — R59.0 OCCIPITAL LYMPHADENOPATHY: Primary | ICD-10-CM

## 2021-05-10 RX ORDER — AMOXICILLIN AND CLAVULANATE POTASSIUM 875; 125 MG/1; MG/1
1 TABLET, FILM COATED ORAL 2 TIMES DAILY
Qty: 20 TABLET | Refills: 0 | Status: SHIPPED | OUTPATIENT
Start: 2021-05-10 | End: 2021-07-26

## 2021-05-10 NOTE — PROGRESS NOTES
Spoke with Joyce. She has had recurrence of swelling in the left occipital lymph nodes.   We treated this in the past with abx and it significantly improved.   She would like to try again while awaiting her appt with endo next week to re-evaluate the newly detected cyst in her thyroid on US last week in setting of hx of thyroid cancer.

## 2021-05-18 ENCOUNTER — LAB (OUTPATIENT)
Dept: LAB | Facility: HOSPITAL | Age: 51
End: 2021-05-18

## 2021-05-18 ENCOUNTER — OFFICE VISIT (OUTPATIENT)
Dept: ENDOCRINOLOGY | Facility: CLINIC | Age: 51
End: 2021-05-18

## 2021-05-18 VITALS
WEIGHT: 178.4 LBS | HEART RATE: 94 BPM | BODY MASS INDEX: 28.67 KG/M2 | TEMPERATURE: 98.4 F | DIASTOLIC BLOOD PRESSURE: 70 MMHG | SYSTOLIC BLOOD PRESSURE: 115 MMHG | HEIGHT: 66 IN | OXYGEN SATURATION: 100 %

## 2021-05-18 DIAGNOSIS — C73 PAPILLARY CARCINOMA OF THYROID (HCC): Chronic | ICD-10-CM

## 2021-05-18 DIAGNOSIS — E89.0 POSTOPERATIVE HYPOTHYROIDISM: Primary | Chronic | ICD-10-CM

## 2021-05-18 PROCEDURE — 86800 THYROGLOBULIN ANTIBODY: CPT

## 2021-05-18 PROCEDURE — 36415 COLL VENOUS BLD VENIPUNCTURE: CPT

## 2021-05-18 PROCEDURE — 99214 OFFICE O/P EST MOD 30 MIN: CPT | Performed by: INTERNAL MEDICINE

## 2021-05-18 PROCEDURE — 84432 ASSAY OF THYROGLOBULIN: CPT

## 2021-05-18 NOTE — PROGRESS NOTES
Chief complaint  Thyroid Cancer (Follow Up:  )    Subjective   Joyce Orona is a 51 y.o. female is here today for follow-up.  Follow-up for thyroid cancer.   S/p thyroidectomy 2007 and I - 131 was administered afterwards. She had lab work to follow the cancer and the work -up was negative.   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 T1 NxMx.  In July 2007 patient received 100.4 millicuries of radioactive iodine and uptake in the thyroid bed was seen on posttreatment scan. In July 2008 patient had repeat whole body scan and recurrent activity in the thyroid bed was seen, for which patient received additional therapy.  Thyroglobulin is undetectable since then and she remained on suppression therapy  Takes Synthroid 137 mcg and reduced the dose to 125 mcg  (TSh was 0.04 and free T4 1.77) , she had hot flushes and the nightsweats, increased anxiety (wakes up in the middle of the night)     Vit D deficency was dx on labs and she was given supplements    C/o hot flushes and night sweats, fatigue.       Medications    Current Outpatient Medications:   •  albuterol sulfate  (90 Base) MCG/ACT inhaler, Inhale 2 puffs Every 4 (Four) Hours As Needed for Wheezing or Shortness of Air., Disp: 18 g, Rfl: 3  •  amoxicillin-clavulanate (Augmentin) 875-125 MG per tablet, Take 1 tablet by mouth 2 (Two) Times a Day., Disp: 20 tablet, Rfl: 0  •  levothyroxine (Synthroid) 125 MCG tablet, Take 1 tablet by mouth Daily., Disp: 30 tablet, Rfl: 6  •  meclizine (ANTIVERT) 25 MG tablet, Take 1 tablet by mouth 3 (Three) Times a Day As Needed for dizziness., Disp: 30 tablet, Rfl: 0  •  pantoprazole (Protonix) 40 MG EC tablet, Take 1 tablet by mouth Daily., Disp: 30 tablet, Rfl: 3    Review of systems  Review of Systems   All other systems reviewed and are negative.      Physical exam  Objective   Blood pressure 115/70, pulse 94, temperature 98.4 °F  "(36.9 °C), height 167.6 cm (66\"), weight 80.9 kg (178 lb 6.4 oz), last menstrual period 12/16/2016, SpO2 100 %, not currently breastfeeding.   Physical Exam   Constitutional: She is oriented to person, place, and time. She appears well-developed.   HENT:   Head: Normocephalic and atraumatic.   Eyes: Conjunctivae are normal.   Neck: No thyromegaly present.   The neck is supple and symmetric   Cardiovascular: Normal rate, regular rhythm and normal heart sounds.   Pulmonary/Chest: Effort normal and breath sounds normal.   Lymphadenopathy:     She has no cervical adenopathy.   Neurological: She is alert and oriented to person, place, and time. She has normal reflexes.   Skin: Skin is warm and dry. No rash noted.   Psychiatric: Thought content normal.   Vitals reviewed.        LABS AND IMAGING          Assessment  Assessment/Plan   Problems Addressed this Visit        Other    Hypothyroidism - Primary (Chronic)    Papillary carcinoma of thyroid (CMS/HCC) (Chronic)      Diagnoses       Codes Comments    Postoperative hypothyroidism    -  Primary ICD-10-CM: E89.0  ICD-9-CM: 244.0     Papillary carcinoma of thyroid (CMS/HCC)     ICD-10-CM: C73  ICD-9-CM: 193           Plan   Cont 125 mcg  Synthroid with the goal of TSH suppression.   Check TG . She has increased the dose of synthroid 2 weeks ago and would need labs repeated in 4 weeks.   Ultrasound today - follow on the 3 mm cyst that was seen 5/7/2021. I have performed bedside ultrasound and the area is not significant. Will follow .  Follow-up in 1 year.     "

## 2021-05-19 LAB — REF LAB TEST METHOD: NORMAL

## 2021-07-22 ENCOUNTER — APPOINTMENT (OUTPATIENT)
Dept: BONE DENSITY | Facility: HOSPITAL | Age: 51
End: 2021-07-22

## 2021-07-26 ENCOUNTER — TELEPHONE (OUTPATIENT)
Dept: INTERNAL MEDICINE | Facility: CLINIC | Age: 51
End: 2021-07-26

## 2021-07-26 DIAGNOSIS — R42 DIZZINESS: ICD-10-CM

## 2021-07-26 RX ORDER — MECLIZINE HYDROCHLORIDE 25 MG/1
25 TABLET ORAL 3 TIMES DAILY PRN
Qty: 30 TABLET | Refills: 2 | Status: SHIPPED | OUTPATIENT
Start: 2021-07-26 | End: 2022-10-25

## 2021-07-26 RX ORDER — PANTOPRAZOLE SODIUM 40 MG/1
40 TABLET, DELAYED RELEASE ORAL DAILY
Qty: 90 TABLET | Refills: 2 | Status: SHIPPED | OUTPATIENT
Start: 2021-07-26 | End: 2021-12-15 | Stop reason: ALTCHOICE

## 2021-07-26 NOTE — TELEPHONE ENCOUNTER
Could I please have a refill on my pantoprazole (Protonix) 40 MG EC tablet and my meclizine (ANTIVERT) 25 MG tablet. I need these sent to [facility] please. Thank you

## 2021-08-11 ENCOUNTER — TELEPHONE (OUTPATIENT)
Dept: INTERNAL MEDICINE | Facility: CLINIC | Age: 51
End: 2021-08-11

## 2021-08-11 DIAGNOSIS — Z91.09 ENVIRONMENTAL ALLERGIES: Primary | ICD-10-CM

## 2021-08-11 RX ORDER — HYDROXYZINE HYDROCHLORIDE 10 MG/1
TABLET, FILM COATED ORAL
Qty: 30 TABLET | Refills: 3 | Status: SHIPPED | OUTPATIENT
Start: 2021-08-11 | End: 2022-10-25

## 2021-08-11 NOTE — TELEPHONE ENCOUNTER
Could I please have a refill on my Hydroxyzine as ragweed has started and I did find that it helped. [facility] please if your able to refill. Thank you

## 2021-08-13 ENCOUNTER — CLINICAL SUPPORT (OUTPATIENT)
Dept: INTERNAL MEDICINE | Facility: CLINIC | Age: 51
End: 2021-08-13

## 2021-08-13 ENCOUNTER — TELEPHONE (OUTPATIENT)
Dept: INTERNAL MEDICINE | Facility: CLINIC | Age: 51
End: 2021-08-13

## 2021-08-13 ENCOUNTER — LAB (OUTPATIENT)
Dept: LAB | Facility: HOSPITAL | Age: 51
End: 2021-08-13

## 2021-08-13 DIAGNOSIS — R39.9 UTI SYMPTOMS: ICD-10-CM

## 2021-08-13 DIAGNOSIS — R39.9 UTI SYMPTOMS: Primary | ICD-10-CM

## 2021-08-13 DIAGNOSIS — R35.0 URINE FREQUENCY: ICD-10-CM

## 2021-08-13 DIAGNOSIS — M54.50 ACUTE LOW BACK PAIN WITHOUT SCIATICA, UNSPECIFIED BACK PAIN LATERALITY: Primary | ICD-10-CM

## 2021-08-13 LAB
BILIRUB BLD-MCNC: NEGATIVE MG/DL
CLARITY, POC: ABNORMAL
COLOR UR: YELLOW
GLUCOSE UR STRIP-MCNC: NEGATIVE MG/DL
KETONES UR QL: NEGATIVE
LEUKOCYTE EST, POC: ABNORMAL
NITRITE UR-MCNC: NEGATIVE MG/ML
PH UR: 6 [PH] (ref 5–8)
PROT UR STRIP-MCNC: NEGATIVE MG/DL
RBC # UR STRIP: NEGATIVE /UL
SP GR UR: 1.02 (ref 1–1.03)
UROBILINOGEN UR QL: NORMAL

## 2021-08-13 PROCEDURE — 87086 URINE CULTURE/COLONY COUNT: CPT | Performed by: FAMILY MEDICINE

## 2021-08-13 PROCEDURE — 81003 URINALYSIS AUTO W/O SCOPE: CPT | Performed by: FAMILY MEDICINE

## 2021-08-13 RX ORDER — NITROFURANTOIN 25; 75 MG/1; MG/1
100 CAPSULE ORAL 2 TIMES DAILY
Qty: 20 CAPSULE | Refills: 0 | Status: SHIPPED | OUTPATIENT
Start: 2021-08-13 | End: 2021-09-10

## 2021-08-13 NOTE — TELEPHONE ENCOUNTER
No results found for this or any previous visit (from the past 168 hour(s)).     Added urine culture and macrobid sent to hospital pharmacy

## 2021-08-13 NOTE — TELEPHONE ENCOUNTER
Pt came in to leave a urine due to having symptoms of low back pain, some low abdomen pressure and frequent urination since yesterday.     Results are that she had some leukocytes and sent results to Dr. Victor since Trisha Aggarwal is out and Dr. Victor may want to send it out for a culture. msg sent to [facility] for further instructions

## 2021-08-14 LAB — BACTERIA SPEC AEROBE CULT: NORMAL

## 2021-09-10 ENCOUNTER — OFFICE VISIT (OUTPATIENT)
Dept: INTERNAL MEDICINE | Facility: CLINIC | Age: 51
End: 2021-09-10

## 2021-09-10 VITALS
SYSTOLIC BLOOD PRESSURE: 128 MMHG | HEIGHT: 66 IN | OXYGEN SATURATION: 98 % | HEART RATE: 80 BPM | WEIGHT: 178.6 LBS | TEMPERATURE: 97.5 F | BODY MASS INDEX: 28.7 KG/M2 | RESPIRATION RATE: 18 BRPM | DIASTOLIC BLOOD PRESSURE: 82 MMHG

## 2021-09-10 DIAGNOSIS — Z12.4 ENCOUNTER FOR PAPANICOLAOU SMEAR OF CERVIX: ICD-10-CM

## 2021-09-10 DIAGNOSIS — E89.0 POSTOPERATIVE HYPOTHYROIDISM: ICD-10-CM

## 2021-09-10 DIAGNOSIS — C73 PAPILLARY CARCINOMA OF THYROID (HCC): ICD-10-CM

## 2021-09-10 DIAGNOSIS — Z79.899 CURRENT USE OF PROTON PUMP INHIBITOR: ICD-10-CM

## 2021-09-10 DIAGNOSIS — Z00.00 ANNUAL PHYSICAL EXAM: Primary | ICD-10-CM

## 2021-09-10 DIAGNOSIS — K21.9 GASTROESOPHAGEAL REFLUX DISEASE WITHOUT ESOPHAGITIS: ICD-10-CM

## 2021-09-10 DIAGNOSIS — E55.9 VITAMIN D DEFICIENCY: ICD-10-CM

## 2021-09-10 PROCEDURE — 99396 PREV VISIT EST AGE 40-64: CPT | Performed by: NURSE PRACTITIONER

## 2021-09-10 NOTE — PROGRESS NOTES
Patient Care Team:  Trisha Aggarwal APRN as PCP - General (Nurse Practitioner)  Lucy Harris MD as Consulting Physician (Obstetrics and Gynecology)  Kimberly Vance MD as Consulting Physician (Endocrinology)  Lisandro Trujillo MD as Consulting Physician (Gastroenterology)     Chief complaint: Patient is in today for a physical          Patient is a 51 y.o. female who presents for her yearly physical exam.     HPI      Hypothyroidism-postsurgical due to history of papillary carcinoma of the thyroid .  Symptoms well controlled with her Synthroid.  Her last TSH was stable at 0.447 on 4/1/2021.  See's endo again. Most recent Us was ok    GERD-well controlled with pantoprazole.    Health maintenance/lifestyle:  Health Maintenance   Topic Date Due   • ZOSTER VACCINE (1 of 2) Never done   • PAP SMEAR  04/01/2020   • DXA SCAN  09/17/2020   • INFLUENZA VACCINE  10/01/2021   • MAMMOGRAM  10/17/2021   • ANNUAL PHYSICAL  09/11/2022   • COLORECTAL CANCER SCREENING  07/30/2024   • TDAP/TD VACCINES (2 - Td or Tdap) 02/14/2028   • HEPATITIS C SCREENING  Completed   • COVID-19 Vaccine  Completed   • Pneumococcal Vaccine 0-64  Aged Out       Immunization History   Administered Date(s) Administered   • COVID-19 (PFIZER) 01/15/2021, 02/05/2021   • FluLaval >6 Months 09/06/2020   • Hepatitis A 09/13/2018, 03/13/2019   • Influenza, Unspecified 10/01/2017, 10/01/2018, 10/01/2019   • Tdap 02/14/2018   • Tetanus 01/01/2005       Covid vaccine: Completed  Influenza: Due 10/2021, can do as a nurse visit  Tetanus: Up-to-date, due 2028  Hep A: Completed  Zoster advised to receive at her pharmacy   Hep C screening: neg  5/2021    Cancer-related family history includes Colon cancer in her paternal grandfather.    Colon cancer screening: Last colonoscopy was in 7/2019 with Dr. Trujillo, polyps, repeat due in 2024    Mammogram: Last completed 10/17/2020.  Due next month.  Advised to go ahead and schedule.    Dexa: 2018, due.  Was ordered  earlier this year.  She will schedule.     reports being sexually active and has had partner(s) who are Male. She reports using the following method of birth control/protection: None.  STI concerns: Denies    P[facility]: Due, [facility] to have completed with her GYN but due to some scheduling conflicts they are unable to complete.  She would like to complete this today.    Menses:    Patient's last menstrual period was 12/16/2016.   Postmenopausal    Seatbelt use: Wears      Diet: Tries to eat fairly healthy, not as much recently   Caffeine: No  Exercise: Walks frequently, not as much last few weeks     Social History     Tobacco Use   Smoking Status Never Smoker   Smokeless Tobacco Never Used     Social History     Substance and Sexual Activity   Alcohol Use No       PHQ-2 Depression Screening  Little interest or pleasure in doing things? 0   Feeling down, depressed, or hopeless? 0   PHQ-2 Total Score 0       Review of Systems   Constitutional: Negative for fatigue, fever and unexpected weight loss.   Eyes: Negative for blurred vision, double vision, pain and visual disturbance.   Respiratory: Negative for cough, chest tightness, shortness of breath and wheezing.    Cardiovascular: Negative for chest pain, palpitations and leg swelling.   Gastrointestinal: Positive for GERD. Negative for abdominal pain, constipation, diarrhea, nausea and vomiting.   Genitourinary: Negative for difficulty urinating, frequency and urgency.   Musculoskeletal: Negative for arthralgias and myalgias.   Skin: Negative for color change and rash.   Allergic/Immunologic: Positive for environmental allergies.   Neurological: Negative for dizziness, weakness, light-headedness, headache and confusion.   Hematological: Negative for adenopathy. Does not bruise/bleed easily.   Psychiatric/Behavioral: Negative for stress.         History  Social History     Socioeconomic History   • Marital status:      Spouse name: Not on file    • Number of children: Not on file   • Years of education: Not on file   • Highest education level: Not on file   Tobacco Use   • Smoking status: Never Smoker   • Smokeless tobacco: Never Used   Substance and Sexual Activity   • Alcohol use: No   • Drug use: No   • Sexual activity: Yes     Partners: Male     Birth control/protection: None     Comment:      Past Medical History:   Diagnosis Date   • GERD (gastroesophageal reflux disease)    • H/O bone density study 09/2018    normal   • H/O mammogram 02/01/2017   • Hx of radiation therapy    • Migraine    • Occipital lymphadenopathy 1/26/2021   • Otitis media 8/15/2016    Impression: 02/13/2015 - amoxil 875 mg bid x 10 days;    • Pap smear for cervical cancer screening 04/01/2017   • Thyroid cancer (CMS/HCC) 05/2007    radioiodine tx    • Tubular adenoma of colon 07/2019      Past Surgical History:   Procedure Laterality Date   • COLONOSCOPY  07/30/2019    Dr. Trujillo, tubular adenoma   • TONSILLECTOMY     • TOTAL THYROIDECTOMY  05/2007    Dr. FALCON   • VAGINAL DELIVERY      x1      Allergies   Allergen Reactions   • Antihistamines, Chlorpheniramine-Type Palpitations     TABS.  Elevated heart rate     • Codeine Rash      Family History   Problem Relation Age of Onset   • Diabetes type I Father    • Kidney failure Father 42   • Heart attack Maternal Grandmother 72   • Arthritis Maternal Grandmother    • Diabetes type I Daughter    • Hypertension Mother    • Arthritis Maternal Grandfather    • Arthritis Paternal Grandmother    • Arthritis Paternal Grandfather    • Heart attack Paternal Grandfather 72   • Colon cancer Paternal Grandfather    • No Known Problems Brother        Current Outpatient Medications:   •  albuterol sulfate  (90 Base) MCG/ACT inhaler, Inhale 2 puffs Every 4 (Four) Hours As Needed for Wheezing or Shortness of Air., Disp: 18 g, Rfl: 3  •  hydrOXYzine (ATARAX) 10 MG tablet, Take 1 to 2 tablets by mouth 3 times a day if needed for allergies,  "Disp: 30 tablet, Rfl: 3  •  levothyroxine (Synthroid) 125 MCG tablet, Take 1 tablet by mouth Daily., Disp: 30 tablet, Rfl: 6  •  meclizine (ANTIVERT) 25 MG tablet, Take 1 tablet by mouth 3 (Three) Times a Day As Needed for Dizziness., Disp: 30 tablet, Rfl: 2  •  pantoprazole (Protonix) 40 MG EC tablet, Take 1 tablet by mouth Daily., Disp: 90 tablet, Rfl: 2                  /82   Pulse 80   Temp 97.5 °F (36.4 °C) (Infrared)   Resp 18   Ht 167.6 cm (65.98\")   Wt 81 kg (178 lb 9.6 oz)   LMP 12/16/2016   SpO2 98%   BMI 28.84 kg/m²       Physical Exam  Vitals and nursing note reviewed.   Constitutional:       General: She is not in acute distress.     Appearance: Normal appearance. She is well-developed. She is not diaphoretic.   HENT:      Head: Normocephalic and atraumatic.      Right Ear: External ear normal.      Left Ear: External ear normal.      Mouth/Throat:      Pharynx: No oropharyngeal exudate.   Eyes:      General: No scleral icterus.        Right eye: No discharge.         Left eye: No discharge.      Conjunctiva/sclera: Conjunctivae normal.      Pupils: Pupils are equal, round, and reactive to light.   Neck:      Thyroid: No thyromegaly.      Vascular: No JVD.      Trachea: No tracheal deviation.   Cardiovascular:      Rate and Rhythm: Normal rate and regular rhythm.      Pulses:           Dorsalis pedis pulses are 2+ on the right side and 2+ on the left side.        Posterior tibial pulses are 2+ on the right side and 2+ on the left side.      Heart sounds: Normal heart sounds. No murmur heard.   No friction rub.   Pulmonary:      Effort: Pulmonary effort is normal. No respiratory distress.      Breath sounds: Normal breath sounds. No wheezing or rales.   Chest:      Chest wall: No tenderness.   Abdominal:      General: Bowel sounds are normal. There is no distension.      Palpations: Abdomen is soft. There is no mass.      Tenderness: There is no abdominal tenderness.      Hernia: No hernia " is present. There is no hernia in the left inguinal area or right inguinal area.   Genitourinary:     Exam position: Supine.      Pubic Area: No rash.       Labia:         Right: No rash, tenderness, lesion or injury.         Left: No rash, tenderness, lesion or injury.       Vagina: Normal.      Cervix: Normal.      Uterus: Normal.       Adnexa: Right adnexa normal and left adnexa normal.      Rectum: No external hemorrhoid.   Musculoskeletal:         General: No tenderness or deformity. Normal range of motion.      Cervical back: Normal range of motion and neck supple.   Lymphadenopathy:      Cervical: No cervical adenopathy.      Lower Body: No right inguinal adenopathy. No left inguinal adenopathy.   Skin:     General: Skin is warm and dry.      Coloration: Skin is not pale.      Findings: No erythema or rash.   Neurological:      Mental Status: She is alert and oriented to person, place, and time.      Coordination: Coordination normal.      Deep Tendon Reflexes: Reflexes are normal and symmetric. Reflexes normal.   Psychiatric:         Behavior: Behavior normal.         Thought Content: Thought content normal.         Judgment: Judgment normal.                   Diagnoses and all orders for this visit:    1. Annual physical exam (Primary)  -     CBC (No Diff); Future  -     Comprehensive Metabolic Panel; Future  -     Lipid Panel; Future  -     Vitamin B12; Future  -     Vitamin D 25 Hydroxy; Future  -     Cancel: TSH Rfx On Abnormal To Free T4; Future    2. Encounter for Papanicolaou smear of cervix  -     Liquid-based Pap Smear, Screening; Future    3. Vitamin D deficiency  -     Vitamin D 25 Hydroxy; Future    4. Papillary carcinoma of thyroid (CMS/HCC)  -     Cancel: TSH Rfx On Abnormal To Free T4; Future    5. Postoperative hypothyroidism  -     Cancel: TSH Rfx On Abnormal To Free T4; Future    6. Gastroesophageal reflux disease without esophagitis    7. Current use of proton pump inhibitor  -     Vitamin  B12; Future    Other orders  -     Cancel: TSH; Future    has thyroid labs ordered through endo      Labs  ordered as above- will notify of results and treat accordingly. If patient has not received results within one week via mychart or letter, they will notify my office  Immunizations and screenings:  preventative/screenings are up-to-date/addressed as noted in the HPI.  Counseling: The patient is advised to begin progressive daily aerobic exercise program, improve dietary compliance, continue current medications and return for routine annual checkups  Follow up: Return in about 1 year (around 9/10/2022) for Annual.  Plan of care discussed with pt. They verbalized understanding and agreement.     Trisha Aggarwal, APRN   9/10/2021   12:33 EDT              * Please note that portions of this note were completed with a voice recognition program. Efforts were made to edit the dictation but occasionally words are erroneously transcribed.

## 2021-09-16 ENCOUNTER — LAB (OUTPATIENT)
Dept: LAB | Facility: HOSPITAL | Age: 51
End: 2021-09-16

## 2021-09-16 DIAGNOSIS — Z00.00 ANNUAL PHYSICAL EXAM: ICD-10-CM

## 2021-09-16 DIAGNOSIS — E55.9 VITAMIN D DEFICIENCY: ICD-10-CM

## 2021-09-16 DIAGNOSIS — Z85.850 HISTORY OF THYROID CANCER: ICD-10-CM

## 2021-09-16 DIAGNOSIS — Z79.899 CURRENT USE OF PROTON PUMP INHIBITOR: ICD-10-CM

## 2021-09-16 DIAGNOSIS — C73 PAPILLARY CARCINOMA OF THYROID (HCC): Chronic | ICD-10-CM

## 2021-09-16 DIAGNOSIS — E89.0 POSTOPERATIVE HYPOTHYROIDISM: Chronic | ICD-10-CM

## 2021-09-16 LAB
25(OH)D3 SERPL-MCNC: 32.8 NG/ML (ref 30–100)
ALBUMIN SERPL-MCNC: 4.6 G/DL (ref 3.5–5.2)
ALBUMIN/GLOB SERPL: 1.6 G/DL
ALP SERPL-CCNC: 128 U/L (ref 39–117)
ALT SERPL W P-5'-P-CCNC: 18 U/L (ref 1–33)
ANION GAP SERPL CALCULATED.3IONS-SCNC: 14.1 MMOL/L (ref 5–15)
AST SERPL-CCNC: 27 U/L (ref 1–32)
BILIRUB SERPL-MCNC: 0.4 MG/DL (ref 0–1.2)
BUN SERPL-MCNC: 14 MG/DL (ref 6–20)
BUN/CREAT SERPL: 16.5 (ref 7–25)
CALCIUM SPEC-SCNC: 9.9 MG/DL (ref 8.6–10.5)
CHLORIDE SERPL-SCNC: 104 MMOL/L (ref 98–107)
CHOLEST SERPL-MCNC: 145 MG/DL (ref 0–200)
CO2 SERPL-SCNC: 23.9 MMOL/L (ref 22–29)
CREAT SERPL-MCNC: 0.85 MG/DL (ref 0.57–1)
DEPRECATED RDW RBC AUTO: 44.5 FL (ref 37–54)
ERYTHROCYTE [DISTWIDTH] IN BLOOD BY AUTOMATED COUNT: 12.9 % (ref 12.3–15.4)
GFR SERPL CREATININE-BSD FRML MDRD: 70 ML/MIN/1.73
GLOBULIN UR ELPH-MCNC: 2.9 GM/DL
GLUCOSE SERPL-MCNC: 109 MG/DL (ref 65–99)
HCT VFR BLD AUTO: 45.4 % (ref 34–46.6)
HDLC SERPL-MCNC: 46 MG/DL (ref 40–60)
HGB BLD-MCNC: 14.4 G/DL (ref 12–15.9)
LDLC SERPL CALC-MCNC: 80 MG/DL (ref 0–100)
LDLC/HDLC SERPL: 1.69 {RATIO}
MCH RBC QN AUTO: 29.3 PG (ref 26.6–33)
MCHC RBC AUTO-ENTMCNC: 31.7 G/DL (ref 31.5–35.7)
MCV RBC AUTO: 92.3 FL (ref 79–97)
PLATELET # BLD AUTO: 312 10*3/MM3 (ref 140–450)
PMV BLD AUTO: 9.8 FL (ref 6–12)
POTASSIUM SERPL-SCNC: 3.8 MMOL/L (ref 3.5–5.2)
PROT SERPL-MCNC: 7.5 G/DL (ref 6–8.5)
RBC # BLD AUTO: 4.92 10*6/MM3 (ref 3.77–5.28)
SODIUM SERPL-SCNC: 142 MMOL/L (ref 136–145)
T4 FREE SERPL-MCNC: 1.93 NG/DL (ref 0.93–1.7)
TRIGL SERPL-MCNC: 106 MG/DL (ref 0–150)
TSH SERPL DL<=0.05 MIU/L-ACNC: 0.29 UIU/ML (ref 0.27–4.2)
VIT B12 BLD-MCNC: 797 PG/ML (ref 211–946)
VLDLC SERPL-MCNC: 19 MG/DL (ref 5–40)
WBC # BLD AUTO: 4.77 10*3/MM3 (ref 3.4–10.8)

## 2021-09-16 PROCEDURE — 82607 VITAMIN B-12: CPT | Performed by: NURSE PRACTITIONER

## 2021-09-16 PROCEDURE — 86800 THYROGLOBULIN ANTIBODY: CPT | Performed by: NURSE PRACTITIONER

## 2021-09-16 PROCEDURE — 80053 COMPREHEN METABOLIC PANEL: CPT | Performed by: NURSE PRACTITIONER

## 2021-09-16 PROCEDURE — 80061 LIPID PANEL: CPT | Performed by: NURSE PRACTITIONER

## 2021-09-16 PROCEDURE — 82306 VITAMIN D 25 HYDROXY: CPT | Performed by: NURSE PRACTITIONER

## 2021-09-16 PROCEDURE — 84432 ASSAY OF THYROGLOBULIN: CPT | Performed by: NURSE PRACTITIONER

## 2021-09-16 PROCEDURE — 84443 ASSAY THYROID STIM HORMONE: CPT | Performed by: NURSE PRACTITIONER

## 2021-09-16 PROCEDURE — 85027 COMPLETE CBC AUTOMATED: CPT | Performed by: NURSE PRACTITIONER

## 2021-09-16 PROCEDURE — 84439 ASSAY OF FREE THYROXINE: CPT | Performed by: NURSE PRACTITIONER

## 2021-09-17 LAB — REF LAB TEST METHOD: NORMAL

## 2021-10-08 ENCOUNTER — IMMUNIZATION (OUTPATIENT)
Dept: VACCINE CLINIC | Facility: HOSPITAL | Age: 51
End: 2021-10-08

## 2021-10-08 PROCEDURE — 0003A: CPT | Performed by: INTERNAL MEDICINE

## 2021-10-08 PROCEDURE — 91300 HC SARSCOV02 VAC 30MCG/0.3ML IM: CPT | Performed by: INTERNAL MEDICINE

## 2021-10-08 PROCEDURE — 0004A ADM SARSCOV2 30MCG/0.3ML BOOSTER: CPT | Performed by: INTERNAL MEDICINE

## 2021-10-11 DIAGNOSIS — E89.0 POSTOPERATIVE HYPOTHYROIDISM: ICD-10-CM

## 2021-10-11 NOTE — TELEPHONE ENCOUNTER
I am sorry you have had trouble reaching me. I have not received a phone call at all on my number that is listed. I am doing well and have no symptoms . If it is okay I will remain on same dose and recheck in 3 months.

## 2021-10-12 NOTE — TELEPHONE ENCOUNTER
We have tried several times to reach you by phone, but have been unsuccessful.  Please see below.              Thyroid level is borderline elevated. Does she have any sx of heat intolerance and increased palpitations? We can cont the same dose      or reduce if she has symptoms.  Please contact the office to let us know.               TG level is < 0.1, no evidence of cancer recurrence.

## 2021-10-13 RX ORDER — LEVOTHYROXINE SODIUM 0.12 MG/1
125 TABLET ORAL DAILY
Qty: 90 TABLET | Refills: 1 | Status: SHIPPED | OUTPATIENT
Start: 2021-10-13 | End: 2022-04-08 | Stop reason: DRUGHIGH

## 2021-12-15 ENCOUNTER — OFFICE VISIT (OUTPATIENT)
Dept: INTERNAL MEDICINE | Facility: CLINIC | Age: 51
End: 2021-12-15

## 2021-12-15 VITALS
BODY MASS INDEX: 28.61 KG/M2 | RESPIRATION RATE: 16 BRPM | HEIGHT: 66 IN | DIASTOLIC BLOOD PRESSURE: 62 MMHG | OXYGEN SATURATION: 98 % | TEMPERATURE: 98.1 F | SYSTOLIC BLOOD PRESSURE: 104 MMHG | WEIGHT: 178 LBS | HEART RATE: 83 BPM

## 2021-12-15 DIAGNOSIS — L30.9 ECZEMA OF BOTH HANDS: ICD-10-CM

## 2021-12-15 DIAGNOSIS — Z20.822 CLOSE EXPOSURE TO COVID-19 VIRUS: ICD-10-CM

## 2021-12-15 DIAGNOSIS — Z85.850 HISTORY OF THYROID CANCER: ICD-10-CM

## 2021-12-15 DIAGNOSIS — K21.9 GASTROESOPHAGEAL REFLUX DISEASE WITHOUT ESOPHAGITIS: ICD-10-CM

## 2021-12-15 DIAGNOSIS — M25.542 ARTHRALGIA OF BOTH HANDS: ICD-10-CM

## 2021-12-15 DIAGNOSIS — E89.0 POSTOPERATIVE HYPOTHYROIDISM: Primary | ICD-10-CM

## 2021-12-15 DIAGNOSIS — M25.541 ARTHRALGIA OF BOTH HANDS: ICD-10-CM

## 2021-12-15 DIAGNOSIS — M25.50 MULTIPLE JOINT PAIN: ICD-10-CM

## 2021-12-15 PROCEDURE — 99214 OFFICE O/P EST MOD 30 MIN: CPT | Performed by: NURSE PRACTITIONER

## 2021-12-15 RX ORDER — LANSOPRAZOLE 30 MG/1
30 CAPSULE, DELAYED RELEASE ORAL DAILY
Qty: 90 CAPSULE | Refills: 3 | Status: SHIPPED | OUTPATIENT
Start: 2021-12-15 | End: 2022-06-17 | Stop reason: CLARIF

## 2021-12-15 NOTE — PROGRESS NOTES
Joyce Orona presents to [facility] for     Chief Complaint  Chief Complaint   Patient presents with   • Thyroid Problem     Recheck levels    • Arthritis     Rash on hands, pain in hands          Subjective        History of Present Illness  Joyce is a 51-year-old female who is here today requesting thyroid labs be rechecked.  She does have hypothyroidism postsurgical due to history of a papillary carcinoma.  Her symptoms were previously well controlled with Synthroid.  She does see endocrinology.  Her most recent ultrasound looked okay.  Endocrinology also sent off thyroglobulin antibody in 9/2021 which was normal.    Complains of an intermittent painful rash to her hands.  She is also been having pain in the hands.  Widespread joint pain to her back, bilateral hips, knees  She feels as though she is waking up hot and cold.  She has noticed some changes to her fingernails and toenails.  Reports nails are starting to lift away from nail bed.  She has been having more palpitations and feels just jittery and on edge.  She has been having headaches.  She has been having more reflux.  She feels as though but the Protonix is causing the headaches and would like to go back to her Prevacid she thinks she did better with it.  She reports she just generally does not feel well.  Incidentally, she does have quite a bit of stress at her place of employment.    She is happily  for over 30 years and has has a daughter, Sis, who is in college at [employer] and is thriving there.  Joyce is very proud as she should be.    Review of Systems   Constitutional: Positive for chills, diaphoresis and fatigue. Negative for activity change, appetite change, fever, unexpected weight gain and unexpected weight loss.   Eyes: Negative for blurred vision, double vision and visual disturbance.   Respiratory: Negative for cough, shortness of breath and wheezing.    Cardiovascular: Positive for  palpitations. Negative for chest pain and leg swelling.   Gastrointestinal: Positive for GERD and indigestion. Negative for abdominal pain, constipation, diarrhea, nausea and vomiting.   Endocrine: Positive for cold intolerance and heat intolerance.   Genitourinary: Negative for difficulty urinating, frequency and urgency.   Musculoskeletal: Positive for arthralgias, back pain and myalgias.   Skin: Positive for dry skin and rash. Negative for color change.        Complains of nail changes   Neurological: Positive for headache. Negative for dizziness and weakness.   Hematological: Negative for adenopathy. Does not bruise/bleed easily.   Psychiatric/Behavioral: Positive for sleep disturbance and stress. The patient is nervous/anxious.          Allergies   Allergen Reactions   • Antihistamines, Chlorpheniramine-Type Palpitations     TABS.  Elevated heart rate     • Codeine Rash     Current Outpatient Medications on File Prior to Visit   Medication Sig Dispense Refill   • albuterol sulfate  (90 Base) MCG/ACT inhaler Inhale 2 puffs Every 4 (Four) Hours As Needed for Wheezing or Shortness of Air. 18 g 3   • hydrOXYzine (ATARAX) 10 MG tablet Take 1 to 2 tablets by mouth 3 times a day if needed for allergies 30 tablet 3   • levothyroxine (Synthroid) 125 MCG tablet Take 1 tablet by mouth Daily. 90 tablet 1   • meclizine (ANTIVERT) 25 MG tablet Take 1 tablet by mouth 3 (Three) Times a Day As Needed for Dizziness. 30 tablet 2   • [DISCONTINUED] pantoprazole (Protonix) 40 MG EC tablet Take 1 tablet by mouth Daily. 90 tablet 2     No current facility-administered medications on file prior to visit.         The following portions of the patient's history were reviewed and updated as appropriate: allergies, current medications, past family history, past medical history, past social history, past surgical history and problem list and are available for review within electronic record    Objective     Result Review :  "                   Vital Signs:   /62   Pulse 83   Temp 98.1 °F (36.7 °C) (Infrared)   Resp 16   Ht 167.6 cm (65.98\")   Wt 80.7 kg (178 lb)   SpO2 98%   BMI 28.75 kg/m²         Physical Exam  Constitutional:       Appearance: Normal appearance. She is well-developed.   HENT:      Head: Normocephalic and atraumatic.   Eyes:      Conjunctiva/sclera: Conjunctivae normal.      Pupils: Pupils are equal, round, and reactive to light.   Cardiovascular:      Rate and Rhythm: Normal rate and regular rhythm.      Heart sounds: Normal heart sounds.   Pulmonary:      Effort: Pulmonary effort is normal.      Breath sounds: Normal breath sounds.   Abdominal:      General: Bowel sounds are normal.      Palpations: Abdomen is soft.      Tenderness: There is no abdominal tenderness.   Musculoskeletal:         General: Normal range of motion.      Right hand: No swelling, deformity, tenderness or bony tenderness. Normal range of motion. Normal strength.      Left hand: No swelling, deformity, tenderness or bony tenderness. Normal range of motion. Normal strength.      Cervical back: Normal range of motion.      Right hip: Normal.      Left hip: Normal.   Skin:     General: Skin is warm and dry.      Comments: Mild erythema to dorsum of bilateral hands along knuckles   Neurological:      Mental Status: She is alert and oriented to person, place, and time.   Psychiatric:         Behavior: Behavior normal.         Thought Content: Thought content normal.         Judgment: Judgment normal.                 Assessment and Plan      Diagnoses and all orders for this visit:    1. Postoperative hypothyroidism (Primary)  -     TSH; Future  -     T4, Free; Future  Recheck thyroid labs.  2. History of thyroid cancer    3. Arthralgia of both hands    4. Multiple joint pain  -     TSH; Future  -     T4, Free; Future  -     Ferritin; Future  -     Iron Profile; Future  -     Vitamin D 25 Hydroxy; Future  -     LO With / DsDNA, RNP, " Sjogrens A / B, Spivey; Future  -     Cyclic Citrul Peptide Antibody, IgG / IgA; Future  -     C-reactive Protein; Future  -     Rheumatoid Factor; Future  -     Sedimentation Rate; Future  -     CBC & Differential; Future  -     Comprehensive Metabolic Panel; Future  We will check labs as above to evaluate for any autoimmune disorders as she does have symptoms bilaterally some stiffness as well  5. Gastroesophageal reflux disease without esophagitis  -     lansoprazole (Prevacid) 30 MG capsule; Take 1 capsule by mouth Daily.  Dispense: 90 capsule; Refill: 3  We will switch her pantoprazole back over to Prevacid and see how she does with this.  6.  Eczema both hands  cerave hand cream. OTC frequently. Apply soon after washing hands and use liberally.     Follow Up     Patient was given instructions and counseling regarding her condition or for health maintenance advice. Please see specific information pulled into the AVS if appropriate.   Any new medication's adverse effects have been discussed in detail with patient  Patient was encouraged to keep me informed of any acute changes, lack of improvement, or any new concerning symptoms.    Return if symptoms worsen or fail to improve.          Dictated Utilizing Dragon Dictation   Please note that portions of this note were completed with a voice recognition program.   Part of this note may be an electronic transcription/translation of spoken language to printed text using the Dragon Dictation System.

## 2021-12-20 ENCOUNTER — LAB (OUTPATIENT)
Dept: LAB | Facility: HOSPITAL | Age: 51
End: 2021-12-20

## 2021-12-20 DIAGNOSIS — E89.0 POSTOPERATIVE HYPOTHYROIDISM: ICD-10-CM

## 2021-12-20 DIAGNOSIS — M25.50 MULTIPLE JOINT PAIN: ICD-10-CM

## 2021-12-20 LAB
25(OH)D3 SERPL-MCNC: 30.1 NG/ML
ALBUMIN SERPL-MCNC: 4.4 G/DL (ref 3.5–5.2)
ALBUMIN/GLOB SERPL: 1.5 G/DL
ALP SERPL-CCNC: 120 U/L (ref 39–117)
ALT SERPL W P-5'-P-CCNC: 27 U/L (ref 1–33)
ANION GAP SERPL CALCULATED.3IONS-SCNC: 14.9 MMOL/L (ref 5–15)
AST SERPL-CCNC: 28 U/L (ref 1–32)
BASOPHILS # BLD AUTO: 0.03 10*3/MM3 (ref 0–0.2)
BASOPHILS NFR BLD AUTO: 0.6 % (ref 0–1.5)
BILIRUB SERPL-MCNC: 0.3 MG/DL (ref 0–1.2)
BUN SERPL-MCNC: 13 MG/DL (ref 6–20)
BUN/CREAT SERPL: 17.3 (ref 7–25)
CALCIUM SPEC-SCNC: 9.3 MG/DL (ref 8.6–10.5)
CHLORIDE SERPL-SCNC: 104 MMOL/L (ref 98–107)
CHROMATIN AB SERPL-ACNC: <10 IU/ML (ref 0–14)
CO2 SERPL-SCNC: 22.1 MMOL/L (ref 22–29)
CREAT SERPL-MCNC: 0.75 MG/DL (ref 0.57–1)
CRP SERPL-MCNC: <0.3 MG/DL (ref 0–0.5)
DEPRECATED RDW RBC AUTO: 39.6 FL (ref 37–54)
EOSINOPHIL # BLD AUTO: 0.12 10*3/MM3 (ref 0–0.4)
EOSINOPHIL NFR BLD AUTO: 2.2 % (ref 0.3–6.2)
ERYTHROCYTE [DISTWIDTH] IN BLOOD BY AUTOMATED COUNT: 12.2 % (ref 12.3–15.4)
ERYTHROCYTE [SEDIMENTATION RATE] IN BLOOD: 17 MM/HR (ref 0–30)
FERRITIN SERPL-MCNC: 56.8 NG/ML (ref 13–150)
GFR SERPL CREATININE-BSD FRML MDRD: 81 ML/MIN/1.73
GLOBULIN UR ELPH-MCNC: 2.9 GM/DL
GLUCOSE SERPL-MCNC: 91 MG/DL (ref 65–99)
HCT VFR BLD AUTO: 42.9 % (ref 34–46.6)
HGB BLD-MCNC: 14.7 G/DL (ref 12–15.9)
IMM GRANULOCYTES # BLD AUTO: 0.01 10*3/MM3 (ref 0–0.05)
IMM GRANULOCYTES NFR BLD AUTO: 0.2 % (ref 0–0.5)
IRON 24H UR-MRATE: 58 MCG/DL (ref 37–145)
IRON SATN MFR SERPL: 15 % (ref 20–50)
LYMPHOCYTES # BLD AUTO: 1.43 10*3/MM3 (ref 0.7–3.1)
LYMPHOCYTES NFR BLD AUTO: 26.3 % (ref 19.6–45.3)
MCH RBC QN AUTO: 30.2 PG (ref 26.6–33)
MCHC RBC AUTO-ENTMCNC: 34.3 G/DL (ref 31.5–35.7)
MCV RBC AUTO: 88.1 FL (ref 79–97)
MONOCYTES # BLD AUTO: 0.49 10*3/MM3 (ref 0.1–0.9)
MONOCYTES NFR BLD AUTO: 9 % (ref 5–12)
NEUTROPHILS NFR BLD AUTO: 3.36 10*3/MM3 (ref 1.7–7)
NEUTROPHILS NFR BLD AUTO: 61.7 % (ref 42.7–76)
NRBC BLD AUTO-RTO: 0 /100 WBC (ref 0–0.2)
PLATELET # BLD AUTO: 329 10*3/MM3 (ref 140–450)
PMV BLD AUTO: 9.7 FL (ref 6–12)
POTASSIUM SERPL-SCNC: 3.4 MMOL/L (ref 3.5–5.2)
PROT SERPL-MCNC: 7.3 G/DL (ref 6–8.5)
RBC # BLD AUTO: 4.87 10*6/MM3 (ref 3.77–5.28)
SODIUM SERPL-SCNC: 141 MMOL/L (ref 136–145)
T4 FREE SERPL-MCNC: 1.74 NG/DL (ref 0.93–1.7)
TIBC SERPL-MCNC: 381 MCG/DL (ref 298–536)
TRANSFERRIN SERPL-MCNC: 256 MG/DL (ref 200–360)
TSH SERPL DL<=0.05 MIU/L-ACNC: 0.15 UIU/ML (ref 0.27–4.2)
WBC NRBC COR # BLD: 5.44 10*3/MM3 (ref 3.4–10.8)

## 2021-12-20 PROCEDURE — 86431 RHEUMATOID FACTOR QUANT: CPT | Performed by: NURSE PRACTITIONER

## 2021-12-20 PROCEDURE — 86235 NUCLEAR ANTIGEN ANTIBODY: CPT | Performed by: NURSE PRACTITIONER

## 2021-12-20 PROCEDURE — 84443 ASSAY THYROID STIM HORMONE: CPT | Performed by: NURSE PRACTITIONER

## 2021-12-20 PROCEDURE — 82306 VITAMIN D 25 HYDROXY: CPT | Performed by: NURSE PRACTITIONER

## 2021-12-20 PROCEDURE — 85652 RBC SED RATE AUTOMATED: CPT | Performed by: NURSE PRACTITIONER

## 2021-12-20 PROCEDURE — 84439 ASSAY OF FREE THYROXINE: CPT | Performed by: NURSE PRACTITIONER

## 2021-12-20 PROCEDURE — 85025 COMPLETE CBC W/AUTO DIFF WBC: CPT | Performed by: NURSE PRACTITIONER

## 2021-12-20 PROCEDURE — 86140 C-REACTIVE PROTEIN: CPT | Performed by: NURSE PRACTITIONER

## 2021-12-20 PROCEDURE — 83540 ASSAY OF IRON: CPT | Performed by: NURSE PRACTITIONER

## 2021-12-20 PROCEDURE — 84466 ASSAY OF TRANSFERRIN: CPT | Performed by: NURSE PRACTITIONER

## 2021-12-20 PROCEDURE — 80053 COMPREHEN METABOLIC PANEL: CPT | Performed by: NURSE PRACTITIONER

## 2021-12-20 PROCEDURE — 82728 ASSAY OF FERRITIN: CPT | Performed by: NURSE PRACTITIONER

## 2021-12-20 PROCEDURE — 86200 CCP ANTIBODY: CPT | Performed by: NURSE PRACTITIONER

## 2021-12-20 PROCEDURE — 86225 DNA ANTIBODY NATIVE: CPT | Performed by: NURSE PRACTITIONER

## 2021-12-20 PROCEDURE — 86038 ANTINUCLEAR ANTIBODIES: CPT | Performed by: NURSE PRACTITIONER

## 2021-12-21 DIAGNOSIS — R76.8 POSITIVE ANA (ANTINUCLEAR ANTIBODY): Primary | ICD-10-CM

## 2021-12-21 LAB
ANA SER QL: POSITIVE
CCP IGA+IGG SERPL IA-ACNC: 8 UNITS (ref 0–19)
CENTROMERE B AB SER-ACNC: <0.2 AI (ref 0–0.9)
CHROMATIN AB SERPL-ACNC: <0.2 AI (ref 0–0.9)
DSDNA AB SER-ACNC: 13 IU/ML (ref 0–9)
ENA JO1 AB SER-ACNC: <0.2 AI (ref 0–0.9)
ENA RNP AB SER-ACNC: 2.2 AI (ref 0–0.9)
ENA SCL70 AB SER-ACNC: <0.2 AI (ref 0–0.9)
ENA SM AB SER-ACNC: <0.2 AI (ref 0–0.9)
ENA SS-A AB SER-ACNC: <0.2 AI (ref 0–0.9)
ENA SS-B AB SER-ACNC: <0.2 AI (ref 0–0.9)
Lab: ABNORMAL

## 2021-12-28 ENCOUNTER — TRANSCRIBE ORDERS (OUTPATIENT)
Dept: LAB | Facility: HOSPITAL | Age: 51
End: 2021-12-28

## 2021-12-28 ENCOUNTER — LAB (OUTPATIENT)
Dept: LAB | Facility: HOSPITAL | Age: 51
End: 2021-12-28

## 2021-12-28 DIAGNOSIS — L50.9 URTICARIA, UNSPECIFIED: ICD-10-CM

## 2021-12-28 DIAGNOSIS — M25.50 PAIN IN JOINT, MULTIPLE SITES: ICD-10-CM

## 2021-12-28 DIAGNOSIS — E03.9 MYXEDEMA HEART DISEASE: Primary | ICD-10-CM

## 2021-12-28 DIAGNOSIS — E03.9 MYXEDEMA HEART DISEASE: ICD-10-CM

## 2021-12-28 DIAGNOSIS — M54.2 CERVICALGIA: ICD-10-CM

## 2021-12-28 DIAGNOSIS — M25.559 PAIN IN JOINT INVOLVING PELVIC REGION AND THIGH, UNSPECIFIED LATERALITY: ICD-10-CM

## 2021-12-28 DIAGNOSIS — I51.9 MYXEDEMA HEART DISEASE: Primary | ICD-10-CM

## 2021-12-28 DIAGNOSIS — I51.9 MYXEDEMA HEART DISEASE: ICD-10-CM

## 2021-12-28 LAB
BACTERIA UR QL AUTO: ABNORMAL /HPF
BILIRUB UR QL STRIP: NEGATIVE
BILIRUB UR QL STRIP: NEGATIVE
C3 SERPL-MCNC: 124 MG/DL (ref 82–167)
C4 SERPL-MCNC: 27 MG/DL (ref 14–44)
CLARITY UR: CLEAR
CLARITY UR: CLEAR
COLOR UR: YELLOW
COLOR UR: YELLOW
GLUCOSE UR STRIP-MCNC: NEGATIVE MG/DL
GLUCOSE UR STRIP-MCNC: NEGATIVE MG/DL
HGB UR QL STRIP.AUTO: NEGATIVE
HGB UR QL STRIP.AUTO: NEGATIVE
HYALINE CASTS UR QL AUTO: ABNORMAL /LPF
KETONES UR QL STRIP: NEGATIVE
KETONES UR QL STRIP: NEGATIVE
LEUKOCYTE ESTERASE UR QL STRIP.AUTO: ABNORMAL
LEUKOCYTE ESTERASE UR QL STRIP.AUTO: ABNORMAL
NITRITE UR QL STRIP: NEGATIVE
NITRITE UR QL STRIP: NEGATIVE
PH UR STRIP.AUTO: 7 [PH] (ref 5–8)
PH UR STRIP.AUTO: 7 [PH] (ref 5–8)
PROT UR QL STRIP: NEGATIVE
PROT UR QL STRIP: NEGATIVE
RBC # UR STRIP: ABNORMAL /HPF
REF LAB TEST METHOD: ABNORMAL
SP GR UR STRIP: 1.02 (ref 1–1.03)
SP GR UR STRIP: 1.02 (ref 1–1.03)
SQUAMOUS #/AREA URNS HPF: ABNORMAL /HPF
UROBILINOGEN UR QL STRIP: ABNORMAL
UROBILINOGEN UR QL STRIP: ABNORMAL
WBC # UR STRIP: ABNORMAL /HPF

## 2021-12-28 PROCEDURE — 87086 URINE CULTURE/COLONY COUNT: CPT

## 2021-12-28 PROCEDURE — 81001 URINALYSIS AUTO W/SCOPE: CPT

## 2021-12-28 PROCEDURE — 36415 COLL VENOUS BLD VENIPUNCTURE: CPT

## 2021-12-28 PROCEDURE — 86160 COMPLEMENT ANTIGEN: CPT

## 2021-12-28 PROCEDURE — 86255 FLUORESCENT ANTIBODY SCREEN: CPT

## 2021-12-28 PROCEDURE — 86038 ANTINUCLEAR ANTIBODIES: CPT

## 2021-12-29 LAB
ANA INTERCELL BRIDGE TITR SER IF: ABNORMAL {TITER}
ANA TITR SER IF: NEGATIVE {TITER}
ANA TITR SER IF: POSITIVE {TITER}
BACTERIA SPEC AEROBE CULT: NORMAL
LABORATORY COMMENT REPORT: NORMAL
Lab: ABNORMAL

## 2021-12-30 LAB — DSDNA AB SER QL CLIF: NEGATIVE

## 2022-01-24 ENCOUNTER — LAB (OUTPATIENT)
Dept: LAB | Facility: HOSPITAL | Age: 52
End: 2022-01-24

## 2022-01-24 DIAGNOSIS — Z20.822 CLOSE EXPOSURE TO COVID-19 VIRUS: ICD-10-CM

## 2022-01-24 PROCEDURE — U0003 INFECTIOUS AGENT DETECTION BY NUCLEIC ACID (DNA OR RNA); SEVERE ACUTE RESPIRATORY SYNDROME CORONAVIRUS 2 (SARS-COV-2) (CORONAVIRUS DISEASE [COVID-19]), AMPLIFIED PROBE TECHNIQUE, MAKING USE OF HIGH THROUGHPUT TECHNOLOGIES AS DESCRIBED BY CMS-2020-01-R: HCPCS | Performed by: INTERNAL MEDICINE

## 2022-01-25 LAB
LABCORP SARS-COV-2, NAA 2 DAY TAT: NORMAL
SARS-COV-2 RNA RESP QL NAA+PROBE: DETECTED

## 2022-01-27 DIAGNOSIS — U07.1 COVID: Primary | ICD-10-CM

## 2022-01-27 DIAGNOSIS — R06.2 WHEEZING: ICD-10-CM

## 2022-01-27 RX ORDER — DEXTROMETHORPHAN HYDROBROMIDE AND PROMETHAZINE HYDROCHLORIDE 15; 6.25 MG/5ML; MG/5ML
5 SYRUP ORAL 4 TIMES DAILY PRN
Qty: 240 ML | Refills: 0 | Status: SHIPPED | OUTPATIENT
Start: 2022-01-27 | End: 2022-10-25

## 2022-01-27 RX ORDER — ALBUTEROL SULFATE 90 UG/1
2 AEROSOL, METERED RESPIRATORY (INHALATION) EVERY 4 HOURS PRN
Qty: 18 G | Refills: 3 | Status: SHIPPED | OUTPATIENT
Start: 2022-01-27 | End: 2022-10-25

## 2022-03-31 ENCOUNTER — TELEPHONE (OUTPATIENT)
Dept: INTERNAL MEDICINE | Facility: CLINIC | Age: 52
End: 2022-03-31

## 2022-03-31 ENCOUNTER — LAB (OUTPATIENT)
Dept: LAB | Facility: HOSPITAL | Age: 52
End: 2022-03-31

## 2022-03-31 DIAGNOSIS — E89.0 POSTOPERATIVE HYPOTHYROIDISM: Chronic | ICD-10-CM

## 2022-03-31 DIAGNOSIS — E89.0 POSTOPERATIVE HYPOTHYROIDISM: Primary | Chronic | ICD-10-CM

## 2022-03-31 LAB
T3FREE SERPL-MCNC: 3.43 PG/ML (ref 2–4.4)
T4 FREE SERPL-MCNC: 1.75 NG/DL (ref 0.93–1.7)
TSH SERPL DL<=0.05 MIU/L-ACNC: 0.13 UIU/ML (ref 0.27–4.2)

## 2022-03-31 PROCEDURE — 84481 FREE ASSAY (FT-3): CPT | Performed by: FAMILY MEDICINE

## 2022-03-31 PROCEDURE — 84443 ASSAY THYROID STIM HORMONE: CPT | Performed by: FAMILY MEDICINE

## 2022-03-31 PROCEDURE — 84439 ASSAY OF FREE THYROXINE: CPT | Performed by: FAMILY MEDICINE

## 2022-03-31 NOTE — TELEPHONE ENCOUNTER
Pt not feeling good, very tired.  Pt had thyroid problems the last time this occurred.  Pulse was regular.  Orders placed for thyroid lab.

## 2022-04-08 RX ORDER — LEVOTHYROXINE SODIUM 112 UG/1
112 TABLET ORAL DAILY
Qty: 90 TABLET | Refills: 1 | Status: SHIPPED | OUTPATIENT
Start: 2022-04-08

## 2022-04-08 NOTE — TELEPHONE ENCOUNTER
Thyroid levels showed elevated thyroid function.  Decrease the dose to 112 mcg daily. Please send rx

## 2022-05-18 DIAGNOSIS — Z78.0 POSTMENOPAUSAL: Primary | ICD-10-CM

## 2022-05-18 DIAGNOSIS — Z12.31 ENCOUNTER FOR SCREENING MAMMOGRAM FOR MALIGNANT NEOPLASM OF BREAST: ICD-10-CM

## 2022-06-13 ENCOUNTER — LAB (OUTPATIENT)
Dept: LAB | Facility: HOSPITAL | Age: 52
End: 2022-06-13

## 2022-06-13 DIAGNOSIS — E89.0 POSTOPERATIVE HYPOTHYROIDISM: ICD-10-CM

## 2022-06-13 DIAGNOSIS — E89.0 POSTOPERATIVE HYPOTHYROIDISM: Primary | ICD-10-CM

## 2022-06-13 LAB
T4 FREE SERPL-MCNC: 1.51 NG/DL (ref 0.93–1.7)
TSH SERPL DL<=0.05 MIU/L-ACNC: 0.51 UIU/ML (ref 0.27–4.2)

## 2022-06-13 PROCEDURE — 84443 ASSAY THYROID STIM HORMONE: CPT | Performed by: NURSE PRACTITIONER

## 2022-06-13 PROCEDURE — 84439 ASSAY OF FREE THYROXINE: CPT | Performed by: NURSE PRACTITIONER

## 2022-06-13 NOTE — PROGRESS NOTES
Joyce has felt unwell for the last week. She feels like her thyroid levels are off. Had a dose reduction in 4/2022 to synthroid 112 mcg. She feels fatigued and sluggish yet  A little jittery at the same time. Will check thyroid labs and can set up FU with me or her endo based on results

## 2022-06-17 ENCOUNTER — TELEPHONE (OUTPATIENT)
Dept: INTERNAL MEDICINE | Facility: CLINIC | Age: 52
End: 2022-06-17

## 2022-06-17 NOTE — TELEPHONE ENCOUNTER
Called [facility] and sent in the brand name Prevacid 30MG Daily for the patient.

## 2022-06-17 NOTE — TELEPHONE ENCOUNTER
I would like to have name brand Prevacid called in if possible. Every time I get it refilled it is a different maker that gets sent and I am having issues with that. Please send to  pharmacy thank you     with patient

## 2022-06-20 ENCOUNTER — PRIOR AUTHORIZATION (OUTPATIENT)
Dept: INTERNAL MEDICINE | Facility: CLINIC | Age: 52
End: 2022-06-20

## 2022-06-22 ENCOUNTER — TELEPHONE (OUTPATIENT)
Dept: INTERNAL MEDICINE | Facility: CLINIC | Age: 52
End: 2022-06-22

## 2022-06-22 NOTE — TELEPHONE ENCOUNTER
LAWRENCE FROM [facility] CALLING ABOUT PATIENT BECCA WATERS.    PRIOR AUTH #: [id number]    IN REGARDS TO PREVACID. [facility] RECEIVED PRIOR AUTH AND NEEDS CLINICAL QUESTIONS ANSWERED. THE ADDITIONAL INFORMATION WILL ALLOW THE INSURANCE TO COVER THE MEDICATION.

## 2022-06-22 NOTE — TELEPHONE ENCOUNTER
Called [facility] and asked to Speak to Ruben. The rep stated that he was not available at that time. Call back number was provided for him to reach out again.

## 2022-08-10 ENCOUNTER — APPOINTMENT (OUTPATIENT)
Dept: MAMMOGRAPHY | Facility: HOSPITAL | Age: 52
End: 2022-08-10

## 2022-08-10 ENCOUNTER — APPOINTMENT (OUTPATIENT)
Dept: BONE DENSITY | Facility: HOSPITAL | Age: 52
End: 2022-08-10

## 2022-08-20 ENCOUNTER — HOSPITAL ENCOUNTER (OUTPATIENT)
Dept: MAMMOGRAPHY | Facility: HOSPITAL | Age: 52
Discharge: HOME | End: 2022-08-20
Admitting: NURSE PRACTITIONER

## 2022-08-20 DIAGNOSIS — Z12.31 ENCOUNTER FOR SCREENING MAMMOGRAM FOR MALIGNANT NEOPLASM OF BREAST: ICD-10-CM

## 2022-08-20 PROCEDURE — 77063 BREAST TOMOSYNTHESIS BI: CPT | Performed by: RADIOLOGY

## 2022-08-20 PROCEDURE — 77063 BREAST TOMOSYNTHESIS BI: CPT

## 2022-08-20 PROCEDURE — 77067 SCR MAMMO BI INCL CAD: CPT | Performed by: RADIOLOGY

## 2022-08-20 PROCEDURE — 77067 SCR MAMMO BI INCL CAD: CPT

## 2022-09-06 DIAGNOSIS — K21.00 GASTROESOPHAGEAL REFLUX DISEASE WITH ESOPHAGITIS WITHOUT HEMORRHAGE: Primary | ICD-10-CM

## 2022-09-06 RX ORDER — LANSOPRAZOLE 30 MG/1
1 CAPSULE, DELAYED RELEASE ORAL
COMMUNITY
End: 2022-09-26 | Stop reason: SDUPTHER

## 2022-09-06 RX ORDER — LANSOPRAZOLE 30 MG/1
30 CAPSULE, DELAYED RELEASE ORAL DAILY
Qty: 90 CAPSULE | Refills: 2 | Status: SHIPPED | OUTPATIENT
Start: 2022-09-06 | End: 2022-09-26 | Stop reason: SDUPTHER

## 2022-09-26 ENCOUNTER — TELEPHONE (OUTPATIENT)
Dept: INTERNAL MEDICINE | Facility: CLINIC | Age: 52
End: 2022-09-26

## 2022-09-26 DIAGNOSIS — K21.00 GASTROESOPHAGEAL REFLUX DISEASE WITH ESOPHAGITIS WITHOUT HEMORRHAGE: ICD-10-CM

## 2022-09-26 RX ORDER — LANSOPRAZOLE 30 MG/1
30 CAPSULE, DELAYED RELEASE ORAL DAILY
Qty: 90 CAPSULE | Refills: 2 | Status: SHIPPED | OUTPATIENT
Start: 2022-09-26 | End: 2022-10-25

## 2022-09-26 NOTE — TELEPHONE ENCOUNTER
Insurance will not approve name brand Prevacid so it cancelled out my refill for the generic of it. Could you please call in new RX for the generic Prevacid to [facility]. Thank you !

## 2022-09-27 DIAGNOSIS — K21.9 GASTROESOPHAGEAL REFLUX DISEASE WITHOUT ESOPHAGITIS: ICD-10-CM

## 2022-09-27 RX ORDER — LANSOPRAZOLE 30 MG/1
30 CAPSULE, DELAYED RELEASE ORAL DAILY
Qty: 90 CAPSULE | Refills: 3 | Status: SHIPPED | OUTPATIENT
Start: 2022-09-27 | End: 2022-11-21 | Stop reason: SDUPTHER

## 2022-09-29 DIAGNOSIS — Z23 COVID-19 VACCINE ADMINISTERED: Primary | ICD-10-CM

## 2022-09-29 PROCEDURE — 0124A PR ADM SARSCOV2 30MCG/0.3ML BST: CPT | Performed by: NURSE PRACTITIONER

## 2022-09-29 PROCEDURE — 91312 COVID-19 (PFIZER) BIVALENT BOOSTER 12+YRS: CPT | Performed by: NURSE PRACTITIONER

## 2022-10-06 ENCOUNTER — APPOINTMENT (OUTPATIENT)
Dept: BONE DENSITY | Facility: HOSPITAL | Age: 52
End: 2022-10-06

## 2022-10-06 ENCOUNTER — APPOINTMENT (OUTPATIENT)
Dept: MAMMOGRAPHY | Facility: HOSPITAL | Age: 52
End: 2022-10-06

## 2022-10-25 ENCOUNTER — OFFICE VISIT (OUTPATIENT)
Dept: INTERNAL MEDICINE | Facility: CLINIC | Age: 52
End: 2022-10-25

## 2022-10-25 VITALS
TEMPERATURE: 97.1 F | HEIGHT: 66 IN | OXYGEN SATURATION: 99 % | SYSTOLIC BLOOD PRESSURE: 130 MMHG | WEIGHT: 178 LBS | HEART RATE: 84 BPM | RESPIRATION RATE: 18 BRPM | BODY MASS INDEX: 28.61 KG/M2 | DIASTOLIC BLOOD PRESSURE: 82 MMHG

## 2022-10-25 DIAGNOSIS — R68.81 EARLY SATIETY: ICD-10-CM

## 2022-10-25 DIAGNOSIS — K21.9 GASTROESOPHAGEAL REFLUX DISEASE WITHOUT ESOPHAGITIS: Primary | ICD-10-CM

## 2022-10-25 DIAGNOSIS — R14.0 BLOATING: ICD-10-CM

## 2022-10-25 PROCEDURE — 99213 OFFICE O/P EST LOW 20 MIN: CPT | Performed by: NURSE PRACTITIONER

## 2022-10-25 NOTE — PROGRESS NOTES
Joyce Orona presents to [facility] for     Chief Complaint  Chief Complaint   Patient presents with   • Heartburn     Taking meds, but lately has been getting worse. Not getting much sleep at night       PCP:  Trisha Aggarwal APRN    Subjective        Heartburn  She complains of early satiety, heartburn and nausea. She reports no abdominal pain, no belching, no chest pain, no choking, no coughing, no dysphagia, no globus sensation, no hoarse voice, no sore throat, no stridor, no tooth decay, no water brash or no wheezing. This is a chronic problem. The current episode started more than 1 year ago. The problem occurs frequently. The problem has been gradually worsening. The heartburn wakes her from sleep. The heartburn limits her activity. The symptoms are aggravated by certain foods and caffeine. Pertinent negatives include no anemia, fatigue, melena, muscle weakness, orthopnea or weight loss. Risk factors include caffeine use. She has tried a PPI for the symptoms. The treatment provided mild relief. Past procedures do not include an abdominal ultrasound, an EGD, esophageal manometry, esophageal pH monitoring, H. pylori antibody titer or a UGI. Past invasive treatments do not include gastroplasty, gastroplication or reflux surgery.   She has been feeling really bloated.    worse over the last 2 weeks.   Worse at night.   Trying to eat earlier.   Coffee and tea are triggers.       Review of Systems   Constitutional: Negative for fatigue, fever and unexpected weight loss.   HENT: Negative for hoarse voice and sore throat.    Eyes: Negative for blurred vision, double vision, pain and visual disturbance.   Respiratory: Negative for cough, choking, chest tightness, shortness of breath and wheezing.    Cardiovascular: Negative for chest pain, palpitations and leg swelling.   Gastrointestinal: Positive for nausea, GERD (reflux) and indigestion. Negative for abdominal  distention, abdominal pain, anal bleeding, blood in stool, constipation, diarrhea, dysphagia, melena, rectal pain and vomiting.   Genitourinary: Negative for difficulty urinating, frequency and urgency.   Musculoskeletal: Negative for arthralgias, myalgias and muscle weakness.   Skin: Negative for color change and rash.   Neurological: Negative for dizziness, weakness, light-headedness, headache and confusion.   Hematological: Negative for adenopathy. Does not bruise/bleed easily.         Allergies   Allergen Reactions   • Antihistamines, Chlorpheniramine-Type Palpitations     TABS.  Elevated heart rate     • Codeine Rash     Current Outpatient Medications on File Prior to Visit   Medication Sig Dispense Refill   • lansoprazole (Prevacid) 30 MG capsule Take 1 capsule by mouth Daily. 90 capsule 3   • levothyroxine (Synthroid) 112 MCG tablet Take 1 tablet by mouth Daily. 90 tablet 1   • [DISCONTINUED] lansoprazole (Prevacid) 30 MG capsule Take 1 capsule by mouth Daily. 90 capsule 2   • [DISCONTINUED] albuterol sulfate  (90 Base) MCG/ACT inhaler Inhale 2 puffs Every 4 (Four) Hours As Needed for Wheezing or Shortness of Air. 18 g 3   • [DISCONTINUED] hydrOXYzine (ATARAX) 10 MG tablet Take 1 to 2 tablets by mouth 3 times a day if needed for allergies 30 tablet 3   • [DISCONTINUED] meclizine (ANTIVERT) 25 MG tablet Take 1 tablet by mouth 3 (Three) Times a Day As Needed for Dizziness. 30 tablet 2   • [DISCONTINUED] promethazine-dextromethorphan (PROMETHAZINE-DM) 6.25-15 MG/5ML syrup Take 5 mL by mouth 4 (Four) Times a Day As Needed for Cough. 240 mL 0     No current facility-administered medications on file prior to visit.         The following portions of the patient's history were reviewed and updated as appropriate: allergies, current medications, past family history, past medical history, past social history, past surgical history and problem list and are available for review within electronic  "record    Objective     Result Review :                    Vital Signs:   /82 (Cuff Size: Adult)   Pulse 84   Temp 97.1 °F (36.2 °C) (Infrared)   Resp 18   Ht 167.6 cm (65.98\")   Wt 80.7 kg (178 lb)   SpO2 99%   BMI 28.75 kg/m²         Physical Exam  Constitutional:       Appearance: Normal appearance. She is well-developed.   HENT:      Head: Normocephalic and atraumatic.   Eyes:      Conjunctiva/sclera: Conjunctivae normal.   Cardiovascular:      Rate and Rhythm: Normal rate and regular rhythm.      Heart sounds: Normal heart sounds.   Pulmonary:      Effort: Pulmonary effort is normal.      Breath sounds: Normal breath sounds.   Abdominal:      General: Abdomen is flat. Bowel sounds are normal.      Palpations: Abdomen is soft.      Tenderness: There is no abdominal tenderness.   Musculoskeletal:         General: Normal range of motion.      Cervical back: Normal range of motion.   Skin:     General: Skin is warm and dry.   Neurological:      Mental Status: She is alert and oriented to person, place, and time.   Psychiatric:         Behavior: Behavior normal.         Thought Content: Thought content normal.         Judgment: Judgment normal.                 Assessment and Plan {CC Problem List  Visit Diagnosis  ROS  Review (Popup)  [facility] Maintenance  Quality  BestPractice  Medications  SmartSets  SnapShot Encounters  Media :23}     Diagnoses and all orders for this visit:    1. Gastroesophageal reflux disease without esophagitis (Primary)  -     Ambulatory referral for Screening EGD    2. Early satiety  -     Ambulatory referral for Screening EGD    3. Bloating  -     Ambulatory referral for Screening EGD      Increase PPI to BID.   Refer for EGD as she does have early satiety and worsening symptoms.   Patient advised on GERD precautions: Weight loss, avoid alcohol and caffeine, avoid NSAIDs, consume small frequent meals, wear loosefitting clothing, and avoid spicy, acidic, or trigger " foods.  May elevate the head of bed 30 degrees at night.      Follow Up     Patient was given instructions and counseling regarding her condition or for health maintenance advice. Please see specific information pulled into the AVS if appropriate.   Any new medication's adverse effects have been discussed in detail with patient  Patient was encouraged to keep me informed of any acute changes, lack of improvement, or any new concerning symptoms.    Return after EGD.          Dictated Utilizing Dragon Dictation   Please note that portions of this note were completed with a voice recognition program.   Part of this note may be an electronic transcription/translation of spoken language to printed text using the Dragon Dictation System.

## 2022-11-21 ENCOUNTER — TELEPHONE (OUTPATIENT)
Dept: INTERNAL MEDICINE | Facility: CLINIC | Age: 52
End: 2022-11-21

## 2022-11-21 DIAGNOSIS — E89.0 POSTOPERATIVE HYPOTHYROIDISM: Primary | ICD-10-CM

## 2022-11-21 DIAGNOSIS — K21.9 GASTROESOPHAGEAL REFLUX DISEASE WITHOUT ESOPHAGITIS: ICD-10-CM

## 2022-11-21 DIAGNOSIS — Z13.220 SCREENING CHOLESTEROL LEVEL: ICD-10-CM

## 2022-11-21 RX ORDER — LANSOPRAZOLE 30 MG/1
30 CAPSULE, DELAYED RELEASE ORAL 2 TIMES DAILY
Qty: 180 CAPSULE | Refills: 3 | Status: SHIPPED | OUTPATIENT
Start: 2022-11-21

## 2022-11-21 NOTE — TELEPHONE ENCOUNTER
lansoprazole (Prevacid) 30 MG capsule we increased this to bid at last visit and I have refills but pharmacy can not refill due to directions still being one daily. Can pharmacy be contacted as I have about a week left since increasing dose. Generic is fine as long as it can be the white and black generic.

## 2022-11-29 ENCOUNTER — APPOINTMENT (OUTPATIENT)
Dept: BONE DENSITY | Facility: HOSPITAL | Age: 52
End: 2022-11-29

## 2023-01-05 ENCOUNTER — LAB (OUTPATIENT)
Dept: INTERNAL MEDICINE | Facility: CLINIC | Age: 53
End: 2023-01-05
Payer: COMMERCIAL

## 2023-01-05 DIAGNOSIS — E89.0 POSTOPERATIVE HYPOTHYROIDISM: ICD-10-CM

## 2023-01-05 DIAGNOSIS — E78.00 ELEVATED LOW-DENSITY LIPOPROTEIN LEVEL: ICD-10-CM

## 2023-01-05 LAB
ALBUMIN SERPL-MCNC: 4.9 G/DL (ref 3.5–5.2)
ALBUMIN/GLOB SERPL: 1.8 G/DL
ALP SERPL-CCNC: 127 U/L (ref 39–117)
ALT SERPL W P-5'-P-CCNC: 14 U/L (ref 1–33)
ANION GAP SERPL CALCULATED.3IONS-SCNC: 11.9 MMOL/L (ref 5–15)
AST SERPL-CCNC: 23 U/L (ref 1–32)
BILIRUB SERPL-MCNC: 0.4 MG/DL (ref 0–1.2)
BUN SERPL-MCNC: 17 MG/DL (ref 6–20)
BUN/CREAT SERPL: 20 (ref 7–25)
CALCIUM SPEC-SCNC: 9.7 MG/DL (ref 8.6–10.5)
CHLORIDE SERPL-SCNC: 105 MMOL/L (ref 98–107)
CHOLEST SERPL-MCNC: 172 MG/DL (ref 0–200)
CO2 SERPL-SCNC: 24.1 MMOL/L (ref 22–29)
CREAT SERPL-MCNC: 0.85 MG/DL (ref 0.57–1)
EGFRCR SERPLBLD CKD-EPI 2021: 81.5 ML/MIN/1.73
GLOBULIN UR ELPH-MCNC: 2.8 GM/DL
GLUCOSE SERPL-MCNC: 79 MG/DL (ref 65–99)
HDLC SERPL-MCNC: 47 MG/DL (ref 40–60)
LDLC SERPL CALC-MCNC: 104 MG/DL (ref 0–100)
LDLC/HDLC SERPL: 2.17 {RATIO}
POTASSIUM SERPL-SCNC: 4 MMOL/L (ref 3.5–5.2)
PROT SERPL-MCNC: 7.7 G/DL (ref 6–8.5)
SODIUM SERPL-SCNC: 141 MMOL/L (ref 136–145)
T3FREE SERPL-MCNC: 3.05 PG/ML (ref 2–4.4)
T4 FREE SERPL-MCNC: 1.72 NG/DL (ref 0.93–1.7)
TRIGL SERPL-MCNC: 115 MG/DL (ref 0–150)
TSH SERPL DL<=0.05 MIU/L-ACNC: 2.36 UIU/ML (ref 0.27–4.2)
VLDLC SERPL-MCNC: 21 MG/DL (ref 5–40)

## 2023-01-05 PROCEDURE — 36415 COLL VENOUS BLD VENIPUNCTURE: CPT | Performed by: INTERNAL MEDICINE

## 2023-01-05 PROCEDURE — 80053 COMPREHEN METABOLIC PANEL: CPT | Performed by: INTERNAL MEDICINE

## 2023-01-05 PROCEDURE — 84439 ASSAY OF FREE THYROXINE: CPT | Performed by: INTERNAL MEDICINE

## 2023-01-05 PROCEDURE — 84481 FREE ASSAY (FT-3): CPT | Performed by: INTERNAL MEDICINE

## 2023-01-05 PROCEDURE — 84443 ASSAY THYROID STIM HORMONE: CPT | Performed by: INTERNAL MEDICINE

## 2023-01-05 PROCEDURE — 80061 LIPID PANEL: CPT | Performed by: INTERNAL MEDICINE

## 2023-01-18 ENCOUNTER — OFFICE VISIT (OUTPATIENT)
Dept: ENDOCRINOLOGY | Facility: CLINIC | Age: 53
End: 2023-01-18
Payer: COMMERCIAL

## 2023-01-18 VITALS
HEART RATE: 84 BPM | SYSTOLIC BLOOD PRESSURE: 116 MMHG | DIASTOLIC BLOOD PRESSURE: 74 MMHG | OXYGEN SATURATION: 99 % | HEIGHT: 66 IN | WEIGHT: 176 LBS | BODY MASS INDEX: 28.28 KG/M2

## 2023-01-18 DIAGNOSIS — E89.0 POSTOPERATIVE HYPOTHYROIDISM: Primary | Chronic | ICD-10-CM

## 2023-01-18 DIAGNOSIS — C73 PAPILLARY CARCINOMA OF THYROID: ICD-10-CM

## 2023-01-18 PROCEDURE — 76536 US EXAM OF HEAD AND NECK: CPT | Performed by: INTERNAL MEDICINE

## 2023-01-18 PROCEDURE — 84432 ASSAY OF THYROGLOBULIN: CPT | Performed by: INTERNAL MEDICINE

## 2023-01-18 PROCEDURE — 86800 THYROGLOBULIN ANTIBODY: CPT | Performed by: INTERNAL MEDICINE

## 2023-01-18 PROCEDURE — 99214 OFFICE O/P EST MOD 30 MIN: CPT | Performed by: INTERNAL MEDICINE

## 2023-01-18 NOTE — PROGRESS NOTES
"Chief complaint  Thyroid Cancer (Follow Up)    Subjective   Joyce Orona is a 52 y.o. female is here today for follow-up.  Follow-up for thyroid cancer.   S/p thyroidectomy 2007 and I - 131 was administered afterwards. She had lab work to follow the cancer and the work -up was negative.   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 T1 NxMx.  In July 2007 patient received 100.4 millicuries of radioactive iodine and uptake in the thyroid bed was seen on posttreatment scan. In July 2008 patient had repeat whole body scan and recurrent activity in the thyroid bed was seen, for which patient received additional therapy.  Thyroglobulin is undetectable since then and she remained on suppression therapy  Takes Synthroid  112 mcg daily.   Vit D deficency was dx on labs and she was given supplements    C/o fatigue. Labs were completed with PCP and TSh is increased to 2.3. She has some of the symptoms of hypothyroidism.       Medications    Current Outpatient Medications:   •  lansoprazole (Prevacid) 30 MG capsule, Take 1 capsule by mouth 2 (Two) Times a Day., Disp: 180 capsule, Rfl: 3  •  levothyroxine (Synthroid) 112 MCG tablet, Take 1 tablet by mouth Daily., Disp: 90 tablet, Rfl: 1    Review of systems  Review of Systems   Constitutional: Positive for fatigue.   All other systems reviewed and are negative.      Physical exam  Objective   /74   Pulse 84   Ht 167.6 cm (65.98\")   Wt 79.8 kg (176 lb)   LMP 12/16/2016   SpO2 99%   BMI 28.42 kg/m²   Physical Exam   Constitutional: She is oriented to person, place, and time. She appears well-developed.   HENT:   Head: Normocephalic and atraumatic.   Eyes: Conjunctivae are normal.   Neck: No thyromegaly present.   The neck is supple and symmetric   Cardiovascular: Normal rate, regular rhythm and normal heart sounds.   Pulmonary/Chest: Effort normal and breath sounds normal. "   Lymphadenopathy:     She has no cervical adenopathy.   Neurological: She is alert and oriented to person, place, and time. She has normal reflexes.   Skin: Skin is warm and dry. No rash noted.   Psychiatric: Thought content normal.   Vitals reviewed.        LABS AND IMAGING    Post-operative Neck Ultrasound  [facility]    PT: Joyce Orona  : 1970  Date:  23  Indication: History of thyroid cancer.    Technique: Real time high resolution imaging of the anterior neck was performed in longitudinal and transverse planes.    Findings:   Compartment / Level I  (Submandibular): no abnormal lymph nodules bilaterally  Compartment / Level II  (Suprahyoid parajugular): no abnormal lymph nodules bilaterally  Compartment / Level III  (Infrahyoid supracricoid parajugular): no abnormal lymph nodules bilaterally  Compartment / Level IV (Infracricoid parajugular): no abnormal lymph nodules bilaterally  Compartment / Level V (Posterior cervical triangle): no abnormal lymph nodules bilaterally  Compartment / Level VI (Central compartment): no abnormal lymph nodules identified  Compartment / Level VII (Substernal space): no abnormal lymph nodules bilaterally    Impression: The structures of the neck are shifted medially as expected post thyroidectomy. The thyroid bed is unremarkable.   No abnormal lymph nodes were identified in surgical anatomic compartments I - VII.      Recommendation: Repeat Neck US recommended as clinically indicated.      Signed: Kimberly Vance MD            Assessment  Assessment & Plan   Problems Addressed this Visit        Other    Hypothyroidism - Primary (Chronic)    Papillary carcinoma of thyroid (HCC) (Chronic)   Diagnoses       Codes Comments    Postoperative hypothyroidism    -  Primary ICD-10-CM: E89.0  ICD-9-CM: 244.0     Papillary carcinoma of thyroid (HCC)     ICD-10-CM: C73  ICD-9-CM: 193           Plan   Recent labs reviewed. TSH is 2.3 - increase the dose  of Synthroid to 112 mcg 4 days a week and 125 mcg 3 days a week. Samples of 125 mcg provided.    Check TG .  U/s showed no evidence of recurrence  Follow-up in 1 year.

## 2023-01-20 LAB — REF LAB TEST METHOD: NORMAL

## 2023-02-27 DIAGNOSIS — L98.8 WRINKLES: ICD-10-CM

## 2023-02-27 DIAGNOSIS — L70.9 ACNE, UNSPECIFIED ACNE TYPE: Primary | ICD-10-CM

## 2023-03-02 ENCOUNTER — PRIOR AUTHORIZATION (OUTPATIENT)
Dept: INTERNAL MEDICINE | Facility: CLINIC | Age: 53
End: 2023-03-02
Payer: COMMERCIAL

## 2023-03-08 NOTE — TELEPHONE ENCOUNTER
PA approved from 3/2/2023-3/2/2024  Pt and pharmacy notified. Pharmacy will mail out to patient when ready.

## 2023-04-20 ENCOUNTER — LAB (OUTPATIENT)
Dept: INTERNAL MEDICINE | Facility: CLINIC | Age: 53
End: 2023-04-20
Payer: COMMERCIAL

## 2023-04-20 DIAGNOSIS — E89.0 POSTOPERATIVE HYPOTHYROIDISM: Chronic | ICD-10-CM

## 2023-04-20 LAB
T4 FREE SERPL-MCNC: 1.49 NG/DL (ref 0.93–1.7)
TSH SERPL DL<=0.05 MIU/L-ACNC: 1.94 UIU/ML (ref 0.27–4.2)

## 2023-04-20 PROCEDURE — 84443 ASSAY THYROID STIM HORMONE: CPT | Performed by: INTERNAL MEDICINE

## 2023-04-20 PROCEDURE — 84439 ASSAY OF FREE THYROXINE: CPT | Performed by: INTERNAL MEDICINE

## 2023-04-20 PROCEDURE — 36415 COLL VENOUS BLD VENIPUNCTURE: CPT | Performed by: INTERNAL MEDICINE

## 2023-04-25 DIAGNOSIS — J01.90 ACUTE BACTERIAL SINUSITIS: Primary | ICD-10-CM

## 2023-04-25 DIAGNOSIS — B96.89 ACUTE BACTERIAL SINUSITIS: Primary | ICD-10-CM

## 2023-04-25 RX ORDER — CEFDINIR 300 MG/1
300 CAPSULE ORAL 2 TIMES DAILY
Qty: 20 CAPSULE | Refills: 0 | Status: SHIPPED | OUTPATIENT
Start: 2023-04-25

## 2023-04-25 NOTE — PROGRESS NOTES
Has been feeling ill for the last week.  She has postnasal drip, sinus pain and pressure.  She has been having a sore throat and some hoarseness but feels like this is related to postnasal drip.  This is better today.  She has pain in teeth.  No dental issues.  She feels like she has a sinus infection.  She has been using antihistamines and those have not helped.  She has taken a COVID test which was negative.  We will go ahead and send in antibiotic.  If no better, recommend to be evaluated further

## 2023-04-27 DIAGNOSIS — J06.9 ACUTE URI: Primary | ICD-10-CM

## 2023-04-27 RX ORDER — METHYLPREDNISOLONE 4 MG/1
TABLET ORAL
Qty: 21 TABLET | Refills: 0 | Status: SHIPPED | OUTPATIENT
Start: 2023-04-27

## 2023-04-27 RX ORDER — DOXYCYCLINE HYCLATE 100 MG/1
100 CAPSULE ORAL 2 TIMES DAILY
Qty: 20 CAPSULE | Refills: 0 | Status: SHIPPED | OUTPATIENT
Start: 2023-04-27 | End: 2023-05-07

## 2023-04-27 RX ORDER — DEXTROMETHORPHAN HYDROBROMIDE AND PROMETHAZINE HYDROCHLORIDE 15; 6.25 MG/5ML; MG/5ML
5 SYRUP ORAL 4 TIMES DAILY PRN
Qty: 200 ML | Refills: 0 | Status: SHIPPED | OUTPATIENT
Start: 2023-04-27

## 2023-04-27 RX ORDER — BENZONATATE 100 MG/1
100 CAPSULE ORAL 3 TIMES DAILY PRN
Qty: 45 CAPSULE | Refills: 0 | Status: SHIPPED | OUTPATIENT
Start: 2023-04-27

## 2023-07-14 ENCOUNTER — TELEPHONE (OUTPATIENT)
Dept: INTERNAL MEDICINE | Facility: CLINIC | Age: 53
End: 2023-07-14

## 2023-07-14 DIAGNOSIS — E89.0 POSTOPERATIVE HYPOTHYROIDISM: Primary | Chronic | ICD-10-CM

## 2023-07-14 NOTE — TELEPHONE ENCOUNTER
Good morning, I have been feeling extra fatigued this week and each day hoping it will get better. I just wondered if I should recheck my labs since we started the dose of taking two different strengths. I had been doing well up until this week. Thank you !

## 2023-07-20 ENCOUNTER — TELEPHONE (OUTPATIENT)
Dept: INTERNAL MEDICINE | Facility: CLINIC | Age: 53
End: 2023-07-20

## 2023-07-20 NOTE — TELEPHONE ENCOUNTER
I had my labs drawn on Monday and was just wondering when they would be looked at by provider. I am still having increased fatigue,joint pain and stiffness. Thank you

## 2023-08-30 ENCOUNTER — LAB (OUTPATIENT)
Dept: INTERNAL MEDICINE | Facility: CLINIC | Age: 53
End: 2023-08-30
Payer: COMMERCIAL

## 2023-08-30 ENCOUNTER — OFFICE VISIT (OUTPATIENT)
Dept: INTERNAL MEDICINE | Facility: CLINIC | Age: 53
End: 2023-08-30
Payer: COMMERCIAL

## 2023-08-30 VITALS
TEMPERATURE: 96.9 F | BODY MASS INDEX: 29.25 KG/M2 | WEIGHT: 182 LBS | SYSTOLIC BLOOD PRESSURE: 114 MMHG | OXYGEN SATURATION: 99 % | HEIGHT: 66 IN | DIASTOLIC BLOOD PRESSURE: 68 MMHG | RESPIRATION RATE: 18 BRPM | HEART RATE: 82 BPM

## 2023-08-30 DIAGNOSIS — E55.9 VITAMIN D DEFICIENCY: ICD-10-CM

## 2023-08-30 DIAGNOSIS — R42 DIZZINESS: ICD-10-CM

## 2023-08-30 DIAGNOSIS — E89.0 POSTOPERATIVE HYPOTHYROIDISM: ICD-10-CM

## 2023-08-30 DIAGNOSIS — E53.8 B12 DEFICIENCY: ICD-10-CM

## 2023-08-30 DIAGNOSIS — F43.9 STRESS: ICD-10-CM

## 2023-08-30 DIAGNOSIS — R53.83 FATIGUE, UNSPECIFIED TYPE: ICD-10-CM

## 2023-08-30 DIAGNOSIS — R53.83 FATIGUE, UNSPECIFIED TYPE: Primary | ICD-10-CM

## 2023-08-30 DIAGNOSIS — R00.2 PALPITATIONS: ICD-10-CM

## 2023-08-30 LAB
25(OH)D3 SERPL-MCNC: 29.3 NG/ML (ref 30–100)
ALBUMIN SERPL-MCNC: 4.6 G/DL (ref 3.5–5.2)
ALBUMIN/GLOB SERPL: 1.5 G/DL
ALP SERPL-CCNC: 131 U/L (ref 39–117)
ALT SERPL W P-5'-P-CCNC: 15 U/L (ref 1–33)
ANION GAP SERPL CALCULATED.3IONS-SCNC: 14 MMOL/L (ref 5–15)
AST SERPL-CCNC: 24 U/L (ref 1–32)
BILIRUB SERPL-MCNC: 0.2 MG/DL (ref 0–1.2)
BUN SERPL-MCNC: 15 MG/DL (ref 6–20)
BUN/CREAT SERPL: 18.8 (ref 7–25)
CALCIUM SPEC-SCNC: 9.7 MG/DL (ref 8.6–10.5)
CHLORIDE SERPL-SCNC: 104 MMOL/L (ref 98–107)
CO2 SERPL-SCNC: 23 MMOL/L (ref 22–29)
CREAT SERPL-MCNC: 0.8 MG/DL (ref 0.57–1)
DEPRECATED RDW RBC AUTO: 41.6 FL (ref 37–54)
EGFRCR SERPLBLD CKD-EPI 2021: 87.1 ML/MIN/1.73
ERYTHROCYTE [DISTWIDTH] IN BLOOD BY AUTOMATED COUNT: 12.7 % (ref 12.3–15.4)
GLOBULIN UR ELPH-MCNC: 3 GM/DL
GLUCOSE SERPL-MCNC: 80 MG/DL (ref 65–99)
HCT VFR BLD AUTO: 44.2 % (ref 34–46.6)
HGB BLD-MCNC: 15.2 G/DL (ref 12–15.9)
MCH RBC QN AUTO: 31 PG (ref 26.6–33)
MCHC RBC AUTO-ENTMCNC: 34.4 G/DL (ref 31.5–35.7)
MCV RBC AUTO: 90 FL (ref 79–97)
PLATELET # BLD AUTO: 343 10*3/MM3 (ref 140–450)
PMV BLD AUTO: 9.8 FL (ref 6–12)
POTASSIUM SERPL-SCNC: 3.7 MMOL/L (ref 3.5–5.2)
PROT SERPL-MCNC: 7.6 G/DL (ref 6–8.5)
RBC # BLD AUTO: 4.91 10*6/MM3 (ref 3.77–5.28)
SODIUM SERPL-SCNC: 141 MMOL/L (ref 136–145)
TSH SERPL DL<=0.05 MIU/L-ACNC: 1.17 UIU/ML (ref 0.27–4.2)
VIT B12 BLD-MCNC: 734 PG/ML (ref 211–946)
WBC NRBC COR # BLD: 7.3 10*3/MM3 (ref 3.4–10.8)

## 2023-08-30 PROCEDURE — 99214 OFFICE O/P EST MOD 30 MIN: CPT | Performed by: NURSE PRACTITIONER

## 2023-08-30 PROCEDURE — 80050 GENERAL HEALTH PANEL: CPT | Performed by: NURSE PRACTITIONER

## 2023-08-30 PROCEDURE — 82306 VITAMIN D 25 HYDROXY: CPT | Performed by: NURSE PRACTITIONER

## 2023-08-30 PROCEDURE — 82607 VITAMIN B-12: CPT | Performed by: NURSE PRACTITIONER

## 2023-08-30 PROCEDURE — 93000 ELECTROCARDIOGRAM COMPLETE: CPT | Performed by: NURSE PRACTITIONER

## 2023-08-30 PROCEDURE — 36415 COLL VENOUS BLD VENIPUNCTURE: CPT | Performed by: NURSE PRACTITIONER

## 2023-08-30 RX ORDER — ESCITALOPRAM OXALATE 5 MG/1
5 TABLET ORAL DAILY
Qty: 30 TABLET | Refills: 1 | Status: SHIPPED | OUTPATIENT
Start: 2023-08-30

## 2023-08-30 NOTE — PROGRESS NOTES
Joyce Orona presents to [facility] PRIMARY CARE for     Chief Complaint  Chief Complaint   Patient presents with    Extremity Weakness     Left side, started today, Arm and leg    Stress     Pt has been under an extreme amount of stress     Tremors     Pt states she has always had a little tremor but has noticed it worsening        PCP:  Trisha Aggarwal APRN    Subjective        History of Present Illness  Joyce chaudhari is here today to discuss some abnormal symptoms she has been having recently on Saturday she felt like she might start to get a migraine in her left sided vision went blurry for a few minutes.  She reports she did not have a headache at that time.  Symptoms resolved quickly.  On Monday she had dizziness and she felt 1 time that her heart fluttering in her chest she checked her blood pressure was 146/92  She does feel that her ears have been full recently she has not been drinking as much water as typical in her left arm and her left leg felt a little bit weak on Monday as well with a slight tremor she reports all symptoms have currently resolved      Health Maintenance:   Health Maintenance   Topic Date Due    BMI FOLLOWUP  Never done    ZOSTER VACCINE (1 of 2) Never done    DXA SCAN  09/17/2020    ANNUAL PHYSICAL  09/10/2022    MAMMOGRAM  08/20/2023    INFLUENZA VACCINE  10/01/2023    COLORECTAL CANCER SCREENING  07/30/2024    PAP SMEAR  09/10/2024    TDAP/TD VACCINES (2 - Td or Tdap) 02/14/2028    HEPATITIS C SCREENING  Completed    COVID-19 Vaccine  Completed    Pneumococcal Vaccine 0-64  Aged Out           Review of Systems   Constitutional:  Negative for appetite change, diaphoresis, fatigue, fever, unexpected weight gain and unexpected weight loss.   HENT:  Positive for ear pain (pressure). Negative for congestion, dental problem, drooling, ear discharge, postnasal drip and sinus pressure.    Eyes:  Positive for visual disturbance. Negative for blurred vision,  double vision and pain.   Respiratory:  Negative for cough, chest tightness, shortness of breath and wheezing.    Cardiovascular:  Negative for chest pain, palpitations and leg swelling.   Gastrointestinal:  Negative for abdominal distention, abdominal pain, constipation, diarrhea, nausea and vomiting.   Endocrine: Negative for polydipsia, polyphagia and polyuria.   Genitourinary:  Negative for difficulty urinating, frequency and urgency.   Musculoskeletal:  Negative for arthralgias and myalgias.   Skin:  Negative for color change and rash.   Neurological:  Positive for dizziness, tremors, weakness and headache. Negative for facial asymmetry, light-headedness, numbness and confusion.   Hematological:  Negative for adenopathy. Does not bruise/bleed easily.   Psychiatric/Behavioral:  Negative for sleep disturbance.        Allergies   Allergen Reactions    Antihistamines, Chlorpheniramine-Type Palpitations     TABS.  Elevated heart rate      Codeine Rash     Current Outpatient Medications on File Prior to Visit   Medication Sig Dispense Refill    imiquimod (ALDARA) 5 % cream Apply 1 application topically to the affected area(s) as directed once daily 24 each 1    lansoprazole (Prevacid) 30 MG capsule Take 1 capsule by mouth 2 (Two) Times a Day. 180 capsule 3    levothyroxine (Synthroid) 112 MCG tablet Take 1 tablet by mouth Daily. 90 tablet 1    tretinoin (RETIN-A) 0.025 % cream Apply 1 application topically to the appropriate area as directed Every Night. 20 g 2     No current facility-administered medications on file prior to visit.         The following portions of the patient's history were reviewed and updated as appropriate: allergies, current medications, past family history, past medical history, past social history, past surgical history and problem list and are available for review within electronic record    Objective     Result Review :                ECG 12 Lead    Date/Time: 8/30/2023 1:34 PM  Performed  "by: Trisha Aggarwal APRN  Authorized by: Trisha Aggarwal APRN   Comparison: not compared with previous ECG   Previous ECG: no previous ECG available  Rate: normal  BPM: 79  QRS axis: normal    Clinical impression: non-specific ECG  Comments: Pr 156  Qrs 82  Qt/qtc 359/393  P-r-t 24  -1  11         Vital Signs:   /68 (BP Location: Left arm, Patient Position: Sitting, Cuff Size: Adult)   Pulse 82   Temp 96.9 °F (36.1 °C) (Infrared)   Resp 18   Ht 167.6 cm (65.98\")   Wt 82.6 kg (182 lb)   SpO2 99%   BMI 29.39 kg/m²         Physical Exam  Vitals and nursing note reviewed.   Constitutional:       General: She is not in acute distress.     Appearance: Normal appearance. She is well-developed. She is not ill-appearing or diaphoretic.   HENT:      Head: Normocephalic and atraumatic.   Eyes:      General: No scleral icterus.     Conjunctiva/sclera: Conjunctivae normal.   Neck:      Vascular: No JVD.   Cardiovascular:      Rate and Rhythm: Normal rate and regular rhythm.      Chest Wall: PMI is not displaced. No thrill.      Heart sounds: Normal heart sounds. No murmur heard.  Pulmonary:      Effort: Pulmonary effort is normal. No accessory muscle usage or respiratory distress.      Breath sounds: Normal breath sounds.   Chest:      Chest wall: No tenderness.   Abdominal:      General: Bowel sounds are normal. There is no distension.      Palpations: Abdomen is soft.      Tenderness: There is no abdominal tenderness.   Musculoskeletal:         General: Normal range of motion.      Cervical back: Normal range of motion.      Right lower leg: No edema.      Left lower leg: No edema.   Skin:     General: Skin is warm and dry.      Coloration: Skin is not ashen, jaundiced, mottled or pale.      Findings: No erythema.   Neurological:      General: No focal deficit present.      Mental Status: She is alert and oriented to person, place, and time.      GCS: GCS eye subscore is 4. GCS verbal subscore is 5. GCS motor " subscore is 6.      Cranial Nerves: Cranial nerves 2-12 are intact.      Sensory: Sensation is intact. No sensory deficit.      Motor: Motor function is intact.      Coordination: Coordination is intact. Coordination normal. Finger-Nose-Finger Test and Heel to Shin Test normal. Rapid alternating movements normal.      Gait: Gait is intact. Gait normal.      Comments: NIH stroke scale score 0   Psychiatric:         Attention and Perception: Attention normal.         Mood and Affect: Mood and affect normal.         Behavior: Behavior normal. Behavior is cooperative.         Thought Content: Thought content normal.         Cognition and Memory: Cognition normal.         Judgment: Judgment normal.                 Assessment and Plan      Diagnoses and all orders for this visit:    1. Fatigue, unspecified type (Primary)  -     CBC (No Diff); Future  -     Comprehensive Metabolic Panel; Future  -     Vitamin D,25-Hydroxy; Future  -     Vitamin B12; Future  -     TSH Rfx On Abnormal To Free T4; Future  -     Adult Transthoracic Echo Complete W/ Cont if Necessary Per Protocol; Future  -     Duplex Carotid Ultrasound CAR; Future  -     ECG 12 Lead    2. Stress  -     CBC (No Diff); Future  -     Comprehensive Metabolic Panel; Future  -     Vitamin D,25-Hydroxy; Future  -     Vitamin B12; Future  -     TSH Rfx On Abnormal To Free T4; Future  -     escitalopram (Lexapro) 5 MG tablet; Take 1 tablet by mouth Daily.  Dispense: 30 tablet; Refill: 1  -     Adult Transthoracic Echo Complete W/ Cont if Necessary Per Protocol; Future  -     Duplex Carotid Ultrasound CAR; Future    3. Postoperative hypothyroidism  -     CBC (No Diff); Future  -     Comprehensive Metabolic Panel; Future  -     Vitamin D,25-Hydroxy; Future  -     Vitamin B12; Future  -     TSH Rfx On Abnormal To Free T4; Future    4. B12 deficiency  -     Vitamin B12; Future    5. Vitamin D deficiency  -     Vitamin D,25-Hydroxy; Future  -     Cholecalciferol (Vitamin  D3) 1.25 MG (38208 UT) capsule; Take 1 capsule by mouth Every 7 (Seven) Days for 12 doses.  Dispense: 12 capsule; Refill: 0    6. Dizziness  -     Adult Transthoracic Echo Complete W/ Cont if Necessary Per Protocol; Future  -     Duplex Carotid Ultrasound CAR; Future  -     ECG 12 Lead    7. Palpitations  -     Adult Transthoracic Echo Complete W/ Cont if Necessary Per Protocol; Future  -     Duplex Carotid Ultrasound CAR; Future  -     ECG 12 Lead    Exam is essentially normal and symptoms have resolved.  We discussed checking labs as above EKG with no acute ST changes.  We will proceed with echo and carotid duplex to further evaluate the dizziness.  We did discuss MRI but she is not interested at this point    Follow Up     Patient was given instructions and counseling regarding her condition or for health maintenance advice. Please see specific information pulled into the AVS if appropriate.   Any new medication's adverse effects have been discussed in detail with patient  Patient was encouraged to keep me informed of any acute changes, lack of improvement, or any new concerning symptoms.    Return in about 4 weeks (around 9/27/2023) for Recheck.          Dictated Utilizing Dragon Dictation   Please note that portions of this note were completed with a voice recognition program.   Part of this note may be an electronic transcription/translation of spoken language to printed text using the Dragon Dictation System.

## 2023-08-31 RX ORDER — CHOLECALCIFEROL (VITAMIN D3) 1250 MCG
50000 CAPSULE ORAL
Qty: 12 CAPSULE | Refills: 0 | Status: SHIPPED | OUTPATIENT
Start: 2023-08-31 | End: 2023-11-24

## 2023-09-08 ENCOUNTER — TELEPHONE (OUTPATIENT)
Dept: INTERNAL MEDICINE | Facility: CLINIC | Age: 53
End: 2023-09-08

## 2023-09-08 NOTE — TELEPHONE ENCOUNTER
I am feeling much better but I think I would like to have an Echo done just to make myself feel better as there is a strong family history of heart issues. I am also open for a carotid ultrasound as well if you feel that would be needed. Thank you

## 2023-09-15 DIAGNOSIS — R29.898 WEAKNESS OF BOTH ARMS: ICD-10-CM

## 2023-09-15 DIAGNOSIS — H53.9 VISUAL DISTURBANCE: ICD-10-CM

## 2023-09-15 DIAGNOSIS — R51.9 ACUTE NONINTRACTABLE HEADACHE, UNSPECIFIED HEADACHE TYPE: ICD-10-CM

## 2023-09-15 DIAGNOSIS — R42 DIZZINESS: Primary | ICD-10-CM

## 2023-09-15 NOTE — PROGRESS NOTES
"Joyce came in today to update me after recent visit and reported another episode of visual disturbance that started last night and she felt like a migraine may be coming on. She has not had migraines in years though. Vision started \"to go\" in left eye and progressed a little to the right. She felt weak in bilateral upper arms, dizzy, and today she still feels slightly weak and vision is somewhat decrease in her left eye. She has no hx of TIA/CVA. She has been takgin ASA 325mg.  Reports she is very sensitive to caffeine but  No caffeine intake recently.    Neuro exam is normal in office except slight decreased vision.   "

## 2023-09-16 ENCOUNTER — HOSPITAL ENCOUNTER (OUTPATIENT)
Dept: MRI IMAGING | Facility: HOSPITAL | Age: 53
Discharge: HOME | End: 2023-09-16
Admitting: NURSE PRACTITIONER
Payer: COMMERCIAL

## 2023-09-16 DIAGNOSIS — R42 DIZZINESS: ICD-10-CM

## 2023-09-16 DIAGNOSIS — R29.898 WEAKNESS OF BOTH ARMS: ICD-10-CM

## 2023-09-16 DIAGNOSIS — R51.9 ACUTE NONINTRACTABLE HEADACHE, UNSPECIFIED HEADACHE TYPE: ICD-10-CM

## 2023-09-16 DIAGNOSIS — H53.9 VISUAL DISTURBANCE: ICD-10-CM

## 2023-09-16 PROCEDURE — 70551 MRI BRAIN STEM W/O DYE: CPT

## 2023-09-18 DIAGNOSIS — R42 DIZZINESS: Primary | ICD-10-CM

## 2023-09-18 DIAGNOSIS — H53.9 VISUAL DISTURBANCE: ICD-10-CM

## 2023-09-18 DIAGNOSIS — R51.9 ACUTE NONINTRACTABLE HEADACHE, UNSPECIFIED HEADACHE TYPE: ICD-10-CM

## 2023-09-18 DIAGNOSIS — R93.89 ABNORMAL MRI: ICD-10-CM

## 2023-09-18 DIAGNOSIS — R29.898 WEAKNESS OF BOTH ARMS: ICD-10-CM

## 2023-09-19 ENCOUNTER — HOSPITAL ENCOUNTER (OUTPATIENT)
Dept: CARDIOLOGY | Facility: HOSPITAL | Age: 53
Discharge: HOME | End: 2023-09-19
Payer: COMMERCIAL

## 2023-09-19 VITALS — BODY MASS INDEX: 30.32 KG/M2 | HEIGHT: 65 IN | WEIGHT: 182 LBS

## 2023-09-19 DIAGNOSIS — R53.83 FATIGUE, UNSPECIFIED TYPE: ICD-10-CM

## 2023-09-19 DIAGNOSIS — F43.9 STRESS: ICD-10-CM

## 2023-09-19 DIAGNOSIS — R42 DIZZINESS: ICD-10-CM

## 2023-09-19 DIAGNOSIS — R00.2 PALPITATIONS: ICD-10-CM

## 2023-09-19 LAB
ASCENDING AORTA: 3.1 CM
BH CV ECHO MEAS - AO MAX PG: 5.4 MMHG
BH CV ECHO MEAS - AO MEAN PG: 3 MMHG
BH CV ECHO MEAS - AO ROOT DIAM: 3.1 CM
BH CV ECHO MEAS - AO V2 MAX: 116 CM/SEC
BH CV ECHO MEAS - AO V2 VTI: 17.2 CM
BH CV ECHO MEAS - AVA(I,D): 3.1 CM2
BH CV ECHO MEAS - EDV(CUBED): 50.7 ML
BH CV ECHO MEAS - EDV(MOD-SP2): 50.4 ML
BH CV ECHO MEAS - EDV(MOD-SP4): 62.2 ML
BH CV ECHO MEAS - EF(MOD-BP): 62.2 %
BH CV ECHO MEAS - EF(MOD-SP2): 59.3 %
BH CV ECHO MEAS - EF(MOD-SP4): 65.8 %
BH CV ECHO MEAS - ESV(CUBED): 25.7 ML
BH CV ECHO MEAS - ESV(MOD-SP2): 20.5 ML
BH CV ECHO MEAS - ESV(MOD-SP4): 21.3 ML
BH CV ECHO MEAS - FS: 20.3 %
BH CV ECHO MEAS - IVS/LVPW: 0.8 CM
BH CV ECHO MEAS - IVSD: 0.8 CM
BH CV ECHO MEAS - LA DIMENSION: 2.6 CM
BH CV ECHO MEAS - LV DIASTOLIC VOL/BSA (35-75): 32.7 CM2
BH CV ECHO MEAS - LV MASS(C)D: 96.9 GRAMS
BH CV ECHO MEAS - LV MAX PG: 4.6 MMHG
BH CV ECHO MEAS - LV MEAN PG: 3 MMHG
BH CV ECHO MEAS - LV SYSTOLIC VOL/BSA (12-30): 11.2 CM2
BH CV ECHO MEAS - LV V1 MAX: 107 CM/SEC
BH CV ECHO MEAS - LV V1 VTI: 17.1 CM
BH CV ECHO MEAS - LVIDD: 3.7 CM
BH CV ECHO MEAS - LVIDS: 3 CM
BH CV ECHO MEAS - LVOT AREA: 3.1 CM2
BH CV ECHO MEAS - LVOT DIAM: 2 CM
BH CV ECHO MEAS - LVPWD: 1 CM
BH CV ECHO MEAS - MED PEAK E' VEL: 5 CM/SEC
BH CV ECHO MEAS - MV A MAX VEL: 105 CM/SEC
BH CV ECHO MEAS - MV DEC SLOPE: 591 CM/SEC2
BH CV ECHO MEAS - MV DEC TIME: 0.15 SEC
BH CV ECHO MEAS - MV E MAX VEL: 56.6 CM/SEC
BH CV ECHO MEAS - MV E/A: 0.54
BH CV ECHO MEAS - MV MAX PG: 4.2 MMHG
BH CV ECHO MEAS - MV MEAN PG: 2 MMHG
BH CV ECHO MEAS - MV P1/2T: 31.8 MSEC
BH CV ECHO MEAS - MV V2 VTI: 12 CM
BH CV ECHO MEAS - MVA(P1/2T): 6.9 CM2
BH CV ECHO MEAS - MVA(VTI): 4.5 CM2
BH CV ECHO MEAS - PA ACC TIME: 0.14 SEC
BH CV ECHO MEAS - SI(MOD-SP2): 15.7 ML/M2
BH CV ECHO MEAS - SI(MOD-SP4): 21.5 ML/M2
BH CV ECHO MEAS - SV(LVOT): 53.7 ML
BH CV ECHO MEAS - SV(MOD-SP2): 29.9 ML
BH CV ECHO MEAS - SV(MOD-SP4): 40.9 ML
BH CV ECHO MEAS - TAPSE (>1.6): 1.72 CM
BH CV VAS BP RIGHT ARM: NORMAL MMHG
BH CV XLRA - RV BASE: 3 CM
BH CV XLRA - RV LENGTH: 6.2 CM
BH CV XLRA - RV MID: 1.8 CM
BH CV XLRA - TDI S': 13.5 CM/SEC
BH CV XLRA MEAS LEFT DIST CCA EDV: 39 CM/SEC
BH CV XLRA MEAS LEFT DIST CCA PSV: 123 CM/SEC
BH CV XLRA MEAS LEFT DIST ICA EDV: 44.1 CM/SEC
BH CV XLRA MEAS LEFT DIST ICA PSV: 97.4 CM/SEC
BH CV XLRA MEAS LEFT ICA/CCA RATIO: 0.8
BH CV XLRA MEAS LEFT MID CCA EDV: 30.3 CM/SEC
BH CV XLRA MEAS LEFT MID CCA PSV: 120 CM/SEC
BH CV XLRA MEAS LEFT MID ICA EDV: 30.8 CM/SEC
BH CV XLRA MEAS LEFT MID ICA PSV: 75 CM/SEC
BH CV XLRA MEAS LEFT PROX CCA EDV: 34.1 CM/SEC
BH CV XLRA MEAS LEFT PROX CCA PSV: 182 CM/SEC
BH CV XLRA MEAS LEFT PROX ECA EDV: 25.9 CM/SEC
BH CV XLRA MEAS LEFT PROX ECA PSV: 128 CM/SEC
BH CV XLRA MEAS LEFT PROX ICA EDV: 24.5 CM/SEC
BH CV XLRA MEAS LEFT PROX ICA PSV: 86.9 CM/SEC
BH CV XLRA MEAS LEFT PROX SCLA PSV: 120 CM/SEC
BH CV XLRA MEAS LEFT VERTEBRAL A EDV: 16.2 CM/SEC
BH CV XLRA MEAS LEFT VERTEBRAL A PSV: 59.7 CM/SEC
BH CV XLRA MEAS RIGHT DIST CCA EDV: 31.2 CM/SEC
BH CV XLRA MEAS RIGHT DIST CCA PSV: 121 CM/SEC
BH CV XLRA MEAS RIGHT DIST ICA EDV: 26.3 CM/SEC
BH CV XLRA MEAS RIGHT DIST ICA PSV: 62 CM/SEC
BH CV XLRA MEAS RIGHT ICA/CCA RATIO: 0.8
BH CV XLRA MEAS RIGHT MID CCA EDV: 34 CM/SEC
BH CV XLRA MEAS RIGHT MID CCA PSV: 137 CM/SEC
BH CV XLRA MEAS RIGHT MID ICA EDV: 41.2 CM/SEC
BH CV XLRA MEAS RIGHT MID ICA PSV: 92.2 CM/SEC
BH CV XLRA MEAS RIGHT PROX CCA EDV: 38.4 CM/SEC
BH CV XLRA MEAS RIGHT PROX CCA PSV: 155 CM/SEC
BH CV XLRA MEAS RIGHT PROX ECA EDV: 22.4 CM/SEC
BH CV XLRA MEAS RIGHT PROX ECA PSV: 111 CM/SEC
BH CV XLRA MEAS RIGHT PROX ICA EDV: 31.2 CM/SEC
BH CV XLRA MEAS RIGHT PROX ICA PSV: 78.1 CM/SEC
BH CV XLRA MEAS RIGHT PROX SCLA PSV: 176 CM/SEC
BH CV XLRA MEAS RIGHT VERTEBRAL A EDV: 29.4 CM/SEC
BH CV XLRA MEAS RIGHT VERTEBRAL A PSV: 65.9 CM/SEC
LEFT ARM BP: NORMAL MMHG
RIGHT ARM BP: NORMAL MMHG

## 2023-09-19 PROCEDURE — 93880 EXTRACRANIAL BILAT STUDY: CPT

## 2023-09-19 PROCEDURE — 93880 EXTRACRANIAL BILAT STUDY: CPT | Performed by: INTERNAL MEDICINE

## 2023-09-19 PROCEDURE — 93306 TTE W/DOPPLER COMPLETE: CPT | Performed by: INTERNAL MEDICINE

## 2023-09-19 PROCEDURE — 93306 TTE W/DOPPLER COMPLETE: CPT

## 2023-09-26 ENCOUNTER — OFFICE VISIT (OUTPATIENT)
Dept: NEUROLOGY | Facility: CLINIC | Age: 53
End: 2023-09-26
Payer: COMMERCIAL

## 2023-09-26 VITALS
DIASTOLIC BLOOD PRESSURE: 76 MMHG | BODY MASS INDEX: 30.32 KG/M2 | HEART RATE: 98 BPM | HEIGHT: 65 IN | OXYGEN SATURATION: 98 % | SYSTOLIC BLOOD PRESSURE: 118 MMHG | WEIGHT: 182 LBS

## 2023-09-26 DIAGNOSIS — Z86.69 HX OF MIGRAINES: Primary | ICD-10-CM

## 2023-09-26 DIAGNOSIS — M54.2 NECK PAIN, CHRONIC: ICD-10-CM

## 2023-09-26 DIAGNOSIS — G89.29 NECK PAIN, CHRONIC: ICD-10-CM

## 2023-09-26 DIAGNOSIS — R29.2 HYPERREFLEXIA: ICD-10-CM

## 2023-09-26 NOTE — PROGRESS NOTES
"   Neuro Office Visit      Encounter Date: 2023   Patient Name: Joyce Orona  : 1970   MRN: [id number]   PCP:  Trisha Aggarwal APRN     Chief Complaint:    Chief Complaint   Patient presents with    Extremity Weakness     BUE    Dizziness    Headache    Vision Disturbance       History of Present Illness: Joyce Orona is a 53 y.o. female who is here today in Neurology for  arm weakness, dizziness, headache, abnormal MRI, visual disturbance.    PMH of GERD, mammogram, radiation therapy, migraine, occipital lymphadenopathy, otitis media, thyroid cancer with radioiodine treatment, tubular adenoma of colon, hypothyroidism, positive LO, eczema    Patient was evaluated by PCP on 2023 and at that visit reported symptoms that occurred the prior Saturday in which she fell like she might start to get a migraine and her left-sided vision was blurry for a few minutes, on Monday she had dizziness and felt that her heart was fluttering 1 time, she had also noticed that her left arm and left leg felt weak as well as with a slight tremor, at that visit reported that symptoms had resolved.  PCP had ordered echo and carotid duplex.  She then came to primary care on 9/15/2023 and reported another episode of visual disturbance that started the night prior and for like a migraine may be coming on, she has not had migraines in many years.  Vision started \"to go\" in the left eye and progressed little to the right, she felt weak in bilateral upper arms, dizzy, and on 9/15 she felt slightly weak and that her vision a decrease in her left eye.  No history of TIA/CVA.  She has been taking aspirin 325 mg daily.  In office at that point neuro exam was normal except for slightly decreased vision.  MRI brain was ordered.    MRI brain without contrast performed on 2023 which per impression showed no acute intracranial findings, nonspecific punctate areas of periventricular and subcortical white matter " signal which is felt to be due to chronic microvascular ischemia, minor chronic paranasal sinus disease.    She had carotid duplex performed on 9/19/2023 which showed right internal carotid artery with normal flow without evidence of hemodynamically significant stenosis, left internal carotid artery with normal flow without evidence of hemodynamically significant stenosis.  Antegrade flow in the vertebral arteries bilaterally.  She also underwent cardiac echo on 9/19/2023 for evaluation of palpitations which per report showed left ventricular systolic function is normal, EF 62%, normal right ventricular size and systolic function, no significant valvular abnormalities, no pericardial effusion, per report compared to prior echocardiogram dated 3/26/2019 there are no significant changes.    No vascular risk factors of HTN, HLD, diabetes or smoking.     In clinic today she reports that she last had migraines 32 years ago, recently she started drinking caffeine again which had been a trigger for her migraines before (drinking 2-3 cups of sweet tea), after resuming sweet tea she started having visual disturbance of L eye with decreased focus and then vision will go out in the left eye, states that this occurred on August 28, 2023. She took 3 advil, 1 hour later the visual disturbance went away. The following Monday she had a transient left leg weakness and balance impairment - denies true dizziness but felt unsteady. Arms felt heavy with this episode - she attributed all of this to stress - reports that this lasted about 2 minutes.     She cut caffeine out again and has not had any more episodes. In the afternoons she had noted some fatigue and eye strain, which feels like the beginning of headache. Reports increased stress lately. Sleep is good overall.     She does note chronic neck pain that wakes her up at night. She reports that 2 years ago she did lift a heavy bird bath and thinks she may have injured her neck at  that time, no history of neck surgery or neck injury. She previously did do PT for her neck without much improvement. She reports that she has had neck pain for 12 years. Denies persistent arm weakness or sensation changes.     Great great paternal grandmother had Alzheimer's disease.    Subjective      Review of Systems   Eyes:  Positive for visual disturbance.   Respiratory: Negative.     Endocrine: Negative.    Genitourinary: Negative.    Musculoskeletal:  Positive for neck pain and neck stiffness.   Skin: Negative.    Allergic/Immunologic: Negative.    Neurological:  Positive for dizziness, tremors, weakness and headaches.   Hematological: Negative.    Psychiatric/Behavioral: Negative.          Past Medical History:   Past Medical History:   Diagnosis Date    GERD (gastroesophageal reflux disease)     H/O bone density study 09/2018    normal    H/O mammogram 02/01/2017    Hx of radiation therapy     Migraine     Occipital lymphadenopathy 1/26/2021    Otitis media 8/15/2016    Impression: 02/13/2015 - amoxil 875 mg bid x 10 days;     Pap smear for cervical cancer screening 04/01/2017    Thyroid cancer 05/2007    radioiodine tx     Tubular adenoma of colon 07/2019       Past Surgical History:   Past Surgical History:   Procedure Laterality Date    COLONOSCOPY  07/30/2019    Dr. Trujillo, tubular adenoma    TONSILLECTOMY      TOTAL THYROIDECTOMY  05/2007    Dr. FALCON    VAGINAL DELIVERY      x1       Family History:   Family History   Problem Relation Age of Onset    Hypertension Mother     Diabetes type I Father     Kidney failure Father 42    No Known Problems Brother     Diabetes type I Daughter     Heart attack Maternal Grandmother 72    Arthritis Maternal Grandmother     Arthritis Paternal Grandmother     Arthritis Maternal Grandfather     Arthritis Paternal Grandfather     Heart attack Paternal Grandfather 72    Colon cancer Paternal Grandfather     Breast cancer Neg Hx     Ovarian cancer Neg Hx        Social  "History:   Social History     Socioeconomic History    Marital status:    Tobacco Use    Smoking status: Never     Passive exposure: Never    Smokeless tobacco: Never   Vaping Use    Vaping Use: Never used   Substance and Sexual Activity    Alcohol use: No    Drug use: No    Sexual activity: Yes     Partners: Male     Birth control/protection: None     Comment:        Medications:     Current Outpatient Medications:     Cholecalciferol (Vitamin D3) 1.25 MG (22319 UT) capsule, Take 1 capsule by mouth Every 7 (Seven) Days for 12 doses., Disp: 12 capsule, Rfl: 0    imiquimod (ALDARA) 5 % cream, Apply 1 application topically to the affected area(s) as directed once daily, Disp: 24 each, Rfl: 1    lansoprazole (Prevacid) 30 MG capsule, Take 1 capsule by mouth 2 (Two) Times a Day., Disp: 180 capsule, Rfl: 3    levothyroxine (Synthroid) 112 MCG tablet, Take 1 tablet by mouth Daily., Disp: 90 tablet, Rfl: 1    tretinoin (RETIN-A) 0.025 % cream, Apply 1 application topically to the appropriate area as directed Every Night., Disp: 20 g, Rfl: 2    escitalopram (Lexapro) 5 MG tablet, Take 1 tablet by mouth Daily. (Patient not taking: Reported on 9/26/2023), Disp: 30 tablet, Rfl: 1    Allergies:   Allergies   Allergen Reactions    Antihistamines, Chlorpheniramine-Type Palpitations     TABS.  Elevated heart rate      Codeine Rash         Objective     Objective:    /76   Pulse 98   Ht 165.1 cm (65\")   Wt 82.6 kg (182 lb)   LMP 12/16/2016   SpO2 98%   BMI 30.29 kg/m²   Body mass index is 30.29 kg/m².    Physical Exam  Vitals reviewed.   Constitutional:       Appearance: Normal appearance.   HENT:      Head: Normocephalic and atraumatic.      Mouth/Throat:      Mouth: Mucous membranes are moist.      Pharynx: Oropharynx is clear.   Pulmonary:      Effort: Pulmonary effort is normal. No respiratory distress.   Musculoskeletal:      Right lower leg: No edema.      Left lower leg: No edema.   Skin:     " General: Skin is warm and dry.   Neurological:      Mental Status: She is alert.        Neurology Exam:    General apperance: NAD.     Mental status: Alert, awake and oriented to time place and person.    Fund of knowledge:  Normal.     Language and Speech: No aphasia or dysarthria.    Naming , Repetition and Comprehension:  Can name objects, repeat a sentence and follow commands. Speech is clear and fluent with good repetition, comprehension, and naming.    Cranial Nerves:   CN II: Visual fields are full. Intact. Pupils - PERRLA  CN III, IV and VI: Extraocular movements are intact. Normal saccades.   CN V: Facial sensation is intact.   CN VII: Muscles of facial expression reveal no asymmetry. Intact.   CN VIII: Hearing is intact.   CN IX and X: Palate elevates symmetrically. Intact  CN XI: Shoulder shrug is intact.   CN XII: Tongue is midline without evidence of atrophy or fasciculation.     Motor:  Right UE muscle strength 5/5. Normal tone.     Left UE muscle strength 5/5. Normal tone.      Right LE muscle strength 5/5. Normal tone.     Left LE muscle strength 5/5. Normal tone.      Sensory: Normal light touch and vibration sensation bilaterally.    DTRs: 3+ bilaterally in upper and 2+ lower extremities.    Coordination: Normal finger-to-nose, heel to shin B/L.    Rhomberg: Negative.    Gait: Normal.    Herrera negative bilaterally      Results:   Imaging:   MRI Brain Without Contrast    Result Date: 9/16/2023  Impression: 1. No acute intracranial findings. 2. Nonspecific punctate areas of periventricular and subcortical white matter signal which is felt to be due to chronic microvascular ischemia. 3. Minor chronic paranasal sinus disease. Electronically Signed: Mitch Vera MD  9/16/2023 11:11 AM EDT  Workstation ID: [id number]       Labs:   Lab Results   Component Value Date    GLUCOSE 80 08/30/2023    BUN 15 08/30/2023    CREATININE 0.80 08/30/2023    EGFR 87.1 08/30/2023    BCR 18.8 08/30/2023    K 3.7 08/30/2023     CO2 23.0 08/30/2023    CALCIUM 9.7 08/30/2023    PROTENTOTREF 7.0 06/12/2015    ALBUMIN 4.6 08/30/2023    BILITOT 0.2 08/30/2023    AST 24 08/30/2023    ALT 15 08/30/2023         Assessment / Plan      Assessment/Plan:   Diagnoses and all orders for this visit:    1. Hx of migraines (Primary)    2. Hyperreflexia  -     MRI Cervical Spine Without Contrast; Future    3. Neck pain, chronic  -     MRI Cervical Spine Without Contrast; Future      Joyce Orona is in neurology clinic to establish care for abnormal MRI brain along with evaluation of recent neurologic changes including transient visual changes, left leg and bilateral arm weakness, and history of migraines. I have reviewed her MRI brain and HPI today with Dr. Rodriguez and he felt that the nonspecific punctate areas of periventricular and subcortical white matter signal were likely from age and history of migraines, he did not feel that this was attributable to demyelinating disease such as multiple sclerosis.  On exam I did note that she had hyperreflexia of her bilateral upper extremities which combined with her history of longstanding neck pain and report of neck strain that occurred approximately 2 years ago I have decided to further evaluate with MRI cervical spine, of note she does not have any weakness or numbness noted on today's exam.  Gil negative.  We did discuss her history of migraines as well, she states that since cutting caffeine out of her diet again she has not had any recurrence of these episodes and feels that the migraine-like episodes were likely triggered by resumption of caffeine, she did not wish to start any preventative or abortive treatment for migraines at this time as they seem to have resolved, however I have advised her to please contact me if she notices that migraines are becoming more frequent to discuss additional treatment options, we will plan to see her back in clinic in approximately 3 months or sooner for any  questions or concerns.      Patient Education:       Reviewed medications, potential side effects and signs and symptoms to report. Discussed risk versus benefits of treatment plan with patient and/or family-including medications, labs and radiology that may be ordered. Addressed questions and concerns during visit. Patient and/or family verbalized understanding and agree with plan. Instructed to call the office with any questions and report to ER with any life-threatening symptoms.     Follow Up:   Return in about 3 months (around 12/26/2023).    I spent 30  minutes face to face with the patient. I personally spent 50 percent of this time counseling and discussing diagnosis, diagnostic testing, evaluation, treatment options, and management .       During this visit the following were done:  Labs Reviewed [x]    Labs Ordered []    Radiology Reports Reviewed [x]    Radiology Ordered [x]    PCP Records Reviewed [x]    Referring Provider Records Reviewed []    ER Records Reviewed []    Hospital Records Reviewed []    History Obtained From Family []    Radiology Images Reviewed [x]    Other Reviewed []    Records Requested []      ELVIA Maldonado  [facility]  [address] [address] [phone]

## 2023-09-28 ENCOUNTER — TELEPHONE (OUTPATIENT)
Dept: INTERNAL MEDICINE | Facility: CLINIC | Age: 53
End: 2023-09-28
Payer: COMMERCIAL

## 2023-09-28 RX ORDER — LEVOTHYROXINE SODIUM 112 UG/1
112 TABLET ORAL DAILY
Qty: 90 TABLET | Refills: 1 | Status: SHIPPED | OUTPATIENT
Start: 2023-09-28

## 2023-09-28 NOTE — TELEPHONE ENCOUNTER
Good Morning,I am needing a refill on my name brand only Synthroid 125 to [facility]. Thank you !

## 2023-10-16 ENCOUNTER — FLU SHOT (OUTPATIENT)
Dept: INTERNAL MEDICINE | Facility: CLINIC | Age: 53
End: 2023-10-16
Payer: COMMERCIAL

## 2023-10-16 DIAGNOSIS — Z23 FLU VACCINE NEED: Primary | ICD-10-CM

## 2023-10-16 DIAGNOSIS — Z23 FLU VACCINE NEED: ICD-10-CM

## 2023-10-16 PROCEDURE — 90471 IMMUNIZATION ADMIN: CPT | Performed by: NURSE PRACTITIONER

## 2023-10-16 PROCEDURE — 90686 IIV4 VACC NO PRSV 0.5 ML IM: CPT | Performed by: NURSE PRACTITIONER

## 2023-11-03 ENCOUNTER — TRANSCRIBE ORDERS (OUTPATIENT)
Dept: ADMINISTRATIVE | Facility: HOSPITAL | Age: 53
End: 2023-11-03
Payer: COMMERCIAL

## 2023-11-03 DIAGNOSIS — Z12.31 VISIT FOR SCREENING MAMMOGRAM: Primary | ICD-10-CM

## 2023-12-26 ENCOUNTER — HOSPITAL ENCOUNTER (OUTPATIENT)
Dept: MAMMOGRAPHY | Facility: HOSPITAL | Age: 53
Discharge: HOME | End: 2023-12-26
Admitting: NURSE PRACTITIONER
Payer: COMMERCIAL

## 2023-12-26 DIAGNOSIS — Z12.31 VISIT FOR SCREENING MAMMOGRAM: ICD-10-CM

## 2023-12-26 PROCEDURE — 77063 BREAST TOMOSYNTHESIS BI: CPT

## 2023-12-26 PROCEDURE — 77067 SCR MAMMO BI INCL CAD: CPT

## 2024-01-21 DIAGNOSIS — B02.9 HERPES ZOSTER WITHOUT COMPLICATION: Primary | ICD-10-CM

## 2024-01-21 DIAGNOSIS — R52 PAIN: ICD-10-CM

## 2024-01-21 RX ORDER — IBUPROFEN 800 MG/1
800 TABLET ORAL EVERY 6 HOURS PRN
Qty: 90 TABLET | Refills: 2 | Status: SHIPPED | OUTPATIENT
Start: 2024-01-21

## 2024-01-21 RX ORDER — VALACYCLOVIR HYDROCHLORIDE 1 G/1
1000 TABLET, FILM COATED ORAL 3 TIMES DAILY
Qty: 21 TABLET | Refills: 0 | Status: SHIPPED | OUTPATIENT
Start: 2024-01-21 | End: 2024-01-28

## 2024-01-21 NOTE — PROGRESS NOTES
Joyce called reporting shingles rash that started today. Right chest wall, burning, small blisters.   No itching. Mild pain.    Valacyclovir sent.   She will let me know if worsens or does not resolve

## 2024-02-21 DIAGNOSIS — K21.9 GASTROESOPHAGEAL REFLUX DISEASE WITHOUT ESOPHAGITIS: ICD-10-CM

## 2024-02-21 RX ORDER — LANSOPRAZOLE 30 MG/1
30 CAPSULE, DELAYED RELEASE ORAL 2 TIMES DAILY
Qty: 180 CAPSULE | Refills: 3 | Status: SHIPPED | OUTPATIENT
Start: 2024-02-21

## 2024-03-22 ENCOUNTER — OFFICE VISIT (OUTPATIENT)
Dept: ENDOCRINOLOGY | Facility: CLINIC | Age: 54
End: 2024-03-22
Payer: COMMERCIAL

## 2024-03-22 VITALS
SYSTOLIC BLOOD PRESSURE: 128 MMHG | WEIGHT: 186 LBS | BODY MASS INDEX: 30.99 KG/M2 | HEIGHT: 65 IN | DIASTOLIC BLOOD PRESSURE: 64 MMHG | HEART RATE: 77 BPM

## 2024-03-22 DIAGNOSIS — C73 PAPILLARY CARCINOMA OF THYROID: ICD-10-CM

## 2024-03-22 DIAGNOSIS — E89.0 POSTOPERATIVE HYPOTHYROIDISM: Primary | ICD-10-CM

## 2024-03-22 LAB
25(OH)D3 SERPL-MCNC: 40 NG/ML (ref 30–100)
ALBUMIN SERPL-MCNC: 4 G/DL (ref 3.5–5.2)
ALBUMIN/GLOB SERPL: 1.2 G/DL
ALP SERPL-CCNC: 118 U/L (ref 39–117)
ALT SERPL W P-5'-P-CCNC: 17 U/L (ref 1–33)
ANION GAP SERPL CALCULATED.3IONS-SCNC: 13.8 MMOL/L (ref 5–15)
AST SERPL-CCNC: 23 U/L (ref 1–32)
BILIRUB SERPL-MCNC: 0.3 MG/DL (ref 0–1.2)
BUN SERPL-MCNC: 13 MG/DL (ref 6–20)
BUN/CREAT SERPL: 16.5 (ref 7–25)
CALCIUM SPEC-SCNC: 9.6 MG/DL (ref 8.6–10.5)
CHLORIDE SERPL-SCNC: 107 MMOL/L (ref 98–107)
CO2 SERPL-SCNC: 22.2 MMOL/L (ref 22–29)
CREAT SERPL-MCNC: 0.79 MG/DL (ref 0.57–1)
EGFRCR SERPLBLD CKD-EPI 2021: 88.5 ML/MIN/1.73
GLOBULIN UR ELPH-MCNC: 3.3 GM/DL
GLUCOSE SERPL-MCNC: 94 MG/DL (ref 65–99)
POTASSIUM SERPL-SCNC: 3.9 MMOL/L (ref 3.5–5.2)
PROT SERPL-MCNC: 7.3 G/DL (ref 6–8.5)
SODIUM SERPL-SCNC: 143 MMOL/L (ref 136–145)
T4 FREE SERPL-MCNC: 1.5 NG/DL (ref 0.93–1.7)
TSH SERPL DL<=0.05 MIU/L-ACNC: 3.71 UIU/ML (ref 0.27–4.2)

## 2024-03-22 PROCEDURE — 84432 ASSAY OF THYROGLOBULIN: CPT | Performed by: INTERNAL MEDICINE

## 2024-03-22 PROCEDURE — 86800 THYROGLOBULIN ANTIBODY: CPT | Performed by: INTERNAL MEDICINE

## 2024-03-22 PROCEDURE — 84443 ASSAY THYROID STIM HORMONE: CPT | Performed by: INTERNAL MEDICINE

## 2024-03-22 PROCEDURE — 84439 ASSAY OF FREE THYROXINE: CPT | Performed by: INTERNAL MEDICINE

## 2024-03-22 PROCEDURE — 80053 COMPREHEN METABOLIC PANEL: CPT | Performed by: INTERNAL MEDICINE

## 2024-03-22 PROCEDURE — 82306 VITAMIN D 25 HYDROXY: CPT | Performed by: INTERNAL MEDICINE

## 2024-03-22 NOTE — PROGRESS NOTES
"Chief complaint  Hypothyroidism    Subjective   Joyce Orona is a 53 y.o. female is here today for follow-up.  Follow-up for thyroid cancer.   S/p thyroidectomy 2007 and I - 131 was administered afterwards. She had lab work to follow the cancer and the work -up was negative.   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 T1 Nx Mx.  In July 2007 patient received 100.4 millicuries of radioactive iodine and uptake in the thyroid bed was seen on posttreatment scan. In July 2008 patient had repeat whole body scan and recurrent activity in the thyroid bed was seen, for which patient received additional therapy.  Thyroglobulin is undetectable since then and she remained on suppression therapy, She has been followed with yearly u/s and TG evaluation.     Vit D deficency was dx on labs and she was given supplements    C/o fatigue and weight gain.     Medications    Current Outpatient Medications:     ibuprofen (ADVIL,MOTRIN) 800 MG tablet, Take 1 tablet by mouth Every 6 (Six) Hours As Needed for Moderate Pain., Disp: 90 tablet, Rfl: 2    lansoprazole (Prevacid) 30 MG capsule, Take 1 capsule by mouth 2 (Two) Times a Day., Disp: 180 capsule, Rfl: 3    levothyroxine (Synthroid) 112 MCG tablet, Take 1 tablet by mouth Daily., Disp: 90 tablet, Rfl: 1    tretinoin (RETIN-A) 0.025 % cream, Apply 1 Application topically to the appropriate area as directed Every Night., Disp: , Rfl:     Review of systems  Review of Systems   Constitutional:  Positive for fatigue and unexpected weight change (weight gain).   All other systems reviewed and are negative.      Physical exam  Objective   /64   Pulse 77   Ht 165.1 cm (65\")   Wt 84.4 kg (186 lb)   LMP 12/16/2016   BMI 30.95 kg/m²   Physical Exam   Constitutional: She is oriented to person, place, and time. She appears well-developed.   HENT:   Head: Normocephalic and atraumatic.   Eyes: " Conjunctivae are normal.   Neck: No thyromegaly present.   The neck is supple and symmetric   Cardiovascular: Normal rate, regular rhythm and normal heart sounds.   Pulmonary/Chest: Effort normal and breath sounds normal.   Lymphadenopathy:     She has no cervical adenopathy.   Neurological: She is alert and oriented to person, place, and time. She has normal reflexes.   Skin: Skin is warm and dry. No rash noted.   Psychiatric: Thought content normal.   Vitals reviewed.        LABS AND IMAGING    Post-operative Neck Ultrasound  [facility]    PT: Joyce Orona  : 1970  Date:  24   Indication: History of thyroid cancer.    Technique: Real time high resolution imaging of the anterior neck was performed in longitudinal and transverse planes.    Findings:   Compartment / Level I  (Submandibular): no abnormal lymph nodules bilaterally  Compartment / Level II  (Suprahyoid parajugular): no abnormal lymph nodules bilaterally  Compartment / Level III  (Infrahyoid supracricoid parajugular): no abnormal lymph nodules bilaterally  Compartment / Level IV (Infracricoid parajugular): no abnormal lymph nodules bilaterally  Compartment / Level V (Posterior cervical triangle): no abnormal lymph nodules bilaterally  Compartment / Level VI (Central compartment): no abnormal lymph nodules identified  Compartment / Level VII (Substernal space): no abnormal lymph nodules bilaterally    Impression: The structures of the neck are shifted medially as expected post thyroidectomy. The thyroid bed is unremarkable.   No abnormal lymph nodes were identified in surgical anatomic compartments I - VII.      Recommendation: Repeat Neck US recommended as clinically indicated.      Signed: Kimberly Vance MD            Assessment  Assessment & Plan   Problems Addressed this Visit          Endocrine and Metabolic    Hypothyroidism - Primary (Chronic)    Relevant Medications    tretinoin (RETIN-A) 0.025 % cream     Other Relevant Orders    T4, Free    TSH    Comprehensive Metabolic Panel    Thyroglobulin Antibody and Thyroglobulin, MAHNAZ or LC/MS-MS       Hematology and Neoplasia    Papillary carcinoma of thyroid (Chronic)    Relevant Orders    Vitamin D,25-Hydroxy    US Thyroid (Completed)     Diagnoses         Codes Comments    Postoperative hypothyroidism    -  Primary ICD-10-CM: E89.0  ICD-9-CM: 244.0     Papillary carcinoma of thyroid     ICD-10-CM: C73  ICD-9-CM: 193             Plan  No evidence of thyroid cancer by current evaluation.   Check TG.   U/s showed no evidence of recurrence.   Continue levothyroxine 112 mcg and will adjust the dose after lab review.   Patient has some symptoms of possible hypothyroidism.   Will add Vit D and CMP to the orders.   Follow-up in 1 year.

## 2024-03-25 LAB
THYROGLOB AB SERPL-ACNC: <1 IU/ML (ref 0–0.9)
THYROGLOB SERPL-MCNC: 0.1 NG/ML (ref 1.5–38.5)

## 2024-03-28 RX ORDER — LEVOTHYROXINE SODIUM 0.12 MG/1
125 TABLET ORAL DAILY
Qty: 30 TABLET | Refills: 6 | Status: SHIPPED | OUTPATIENT
Start: 2024-03-28

## 2024-05-06 ENCOUNTER — OFFICE VISIT (OUTPATIENT)
Dept: INTERNAL MEDICINE | Facility: CLINIC | Age: 54
End: 2024-05-06
Payer: COMMERCIAL

## 2024-05-06 VITALS
TEMPERATURE: 97.3 F | DIASTOLIC BLOOD PRESSURE: 72 MMHG | RESPIRATION RATE: 16 BRPM | HEART RATE: 68 BPM | BODY MASS INDEX: 30.16 KG/M2 | SYSTOLIC BLOOD PRESSURE: 116 MMHG | WEIGHT: 181 LBS | OXYGEN SATURATION: 97 % | HEIGHT: 65 IN

## 2024-05-06 DIAGNOSIS — Z12.11 COLON CANCER SCREENING: ICD-10-CM

## 2024-05-06 DIAGNOSIS — R19.7 DIARRHEA, UNSPECIFIED TYPE: ICD-10-CM

## 2024-05-06 DIAGNOSIS — K21.9 GASTROESOPHAGEAL REFLUX DISEASE WITHOUT ESOPHAGITIS: Primary | Chronic | ICD-10-CM

## 2024-05-06 DIAGNOSIS — R14.0 BLOATING: ICD-10-CM

## 2024-05-06 DIAGNOSIS — R68.81 EARLY SATIETY: ICD-10-CM

## 2024-05-06 DIAGNOSIS — R10.9 ABDOMINAL PAIN, UNSPECIFIED ABDOMINAL LOCATION: ICD-10-CM

## 2024-05-06 LAB
BILIRUB BLD-MCNC: NEGATIVE MG/DL
CLARITY, POC: CLEAR
COLOR UR: YELLOW
EXPIRATION DATE: NORMAL
GLUCOSE UR STRIP-MCNC: NEGATIVE MG/DL
KETONES UR QL: NEGATIVE
LEUKOCYTE EST, POC: NEGATIVE
Lab: NORMAL
NITRITE UR-MCNC: NEGATIVE MG/ML
PH UR: 5.5 [PH] (ref 5–8)
PROT UR STRIP-MCNC: NEGATIVE MG/DL
RBC # UR STRIP: NEGATIVE /UL
SP GR UR: 1.01 (ref 1–1.03)
UROBILINOGEN UR QL: NORMAL

## 2024-05-06 PROCEDURE — 99213 OFFICE O/P EST LOW 20 MIN: CPT | Performed by: NURSE PRACTITIONER

## 2024-05-06 PROCEDURE — 81003 URINALYSIS AUTO W/O SCOPE: CPT | Performed by: NURSE PRACTITIONER

## 2024-05-09 ENCOUNTER — HOSPITAL ENCOUNTER (OUTPATIENT)
Facility: HOSPITAL | Age: 54
Discharge: HOME | End: 2024-05-09
Admitting: NURSE PRACTITIONER
Payer: COMMERCIAL

## 2024-05-09 ENCOUNTER — LAB (OUTPATIENT)
Dept: INTERNAL MEDICINE | Facility: CLINIC | Age: 54
End: 2024-05-09
Payer: COMMERCIAL

## 2024-05-09 DIAGNOSIS — R10.9 ABDOMINAL PAIN, UNSPECIFIED ABDOMINAL LOCATION: ICD-10-CM

## 2024-05-09 DIAGNOSIS — R19.7 DIARRHEA, UNSPECIFIED TYPE: ICD-10-CM

## 2024-05-09 DIAGNOSIS — R68.81 EARLY SATIETY: ICD-10-CM

## 2024-05-09 DIAGNOSIS — K21.9 GASTROESOPHAGEAL REFLUX DISEASE WITHOUT ESOPHAGITIS: Chronic | ICD-10-CM

## 2024-05-09 DIAGNOSIS — R14.0 BLOATING: ICD-10-CM

## 2024-05-09 LAB
ALBUMIN SERPL-MCNC: 4.6 G/DL (ref 3.5–5.2)
ALBUMIN/GLOB SERPL: 1.6 G/DL
ALP SERPL-CCNC: 113 U/L (ref 39–117)
ALT SERPL W P-5'-P-CCNC: 21 U/L (ref 1–33)
AMYLASE SERPL-CCNC: 77 U/L (ref 28–100)
ANION GAP SERPL CALCULATED.3IONS-SCNC: 13 MMOL/L (ref 5–15)
AST SERPL-CCNC: 19 U/L (ref 1–32)
BILIRUB SERPL-MCNC: 0.3 MG/DL (ref 0–1.2)
BUN SERPL-MCNC: 12 MG/DL (ref 6–20)
BUN/CREAT SERPL: 14.8 (ref 7–25)
CALCIUM SPEC-SCNC: 9.4 MG/DL (ref 8.6–10.5)
CHLORIDE SERPL-SCNC: 107 MMOL/L (ref 98–107)
CHOLEST SERPL-MCNC: 153 MG/DL (ref 0–200)
CO2 SERPL-SCNC: 24 MMOL/L (ref 22–29)
CREAT SERPL-MCNC: 0.81 MG/DL (ref 0.57–1)
CRP SERPL-MCNC: <0.3 MG/DL (ref 0–0.5)
DEPRECATED RDW RBC AUTO: 41 FL (ref 37–54)
EGFRCR SERPLBLD CKD-EPI 2021: 85.3 ML/MIN/1.73
ERYTHROCYTE [DISTWIDTH] IN BLOOD BY AUTOMATED COUNT: 12.7 % (ref 12.3–15.4)
GLOBULIN UR ELPH-MCNC: 2.8 GM/DL
GLUCOSE SERPL-MCNC: 86 MG/DL (ref 65–99)
HCT VFR BLD AUTO: 44.3 % (ref 34–46.6)
HDLC SERPL-MCNC: 40 MG/DL (ref 40–60)
HGB BLD-MCNC: 14.9 G/DL (ref 12–15.9)
LDLC SERPL CALC-MCNC: 91 MG/DL (ref 0–100)
LDLC/HDLC SERPL: 2.23 {RATIO}
LIPASE SERPL-CCNC: 53 U/L (ref 13–60)
MCH RBC QN AUTO: 29.8 PG (ref 26.6–33)
MCHC RBC AUTO-ENTMCNC: 33.6 G/DL (ref 31.5–35.7)
MCV RBC AUTO: 88.6 FL (ref 79–97)
PLATELET # BLD AUTO: 347 10*3/MM3 (ref 140–450)
PMV BLD AUTO: 9.5 FL (ref 6–12)
POTASSIUM SERPL-SCNC: 3.9 MMOL/L (ref 3.5–5.2)
PROT SERPL-MCNC: 7.4 G/DL (ref 6–8.5)
RBC # BLD AUTO: 5 10*6/MM3 (ref 3.77–5.28)
SODIUM SERPL-SCNC: 144 MMOL/L (ref 136–145)
TRIGL SERPL-MCNC: 120 MG/DL (ref 0–150)
VLDLC SERPL-MCNC: 22 MG/DL (ref 5–40)
WBC NRBC COR # BLD AUTO: 5.52 10*3/MM3 (ref 3.4–10.8)

## 2024-05-09 PROCEDURE — 86258 DGP ANTIBODY EACH IG CLASS: CPT | Performed by: NURSE PRACTITIONER

## 2024-05-09 PROCEDURE — 80053 COMPREHEN METABOLIC PANEL: CPT | Performed by: NURSE PRACTITIONER

## 2024-05-09 PROCEDURE — 85027 COMPLETE CBC AUTOMATED: CPT | Performed by: NURSE PRACTITIONER

## 2024-05-09 PROCEDURE — 86364 TISS TRNSGLTMNASE EA IG CLAS: CPT | Performed by: NURSE PRACTITIONER

## 2024-05-09 PROCEDURE — 86140 C-REACTIVE PROTEIN: CPT | Performed by: NURSE PRACTITIONER

## 2024-05-09 PROCEDURE — 80061 LIPID PANEL: CPT | Performed by: NURSE PRACTITIONER

## 2024-05-09 PROCEDURE — 82784 ASSAY IGA/IGD/IGG/IGM EACH: CPT | Performed by: NURSE PRACTITIONER

## 2024-05-09 PROCEDURE — 36415 COLL VENOUS BLD VENIPUNCTURE: CPT | Performed by: NURSE PRACTITIONER

## 2024-05-09 PROCEDURE — 83690 ASSAY OF LIPASE: CPT | Performed by: NURSE PRACTITIONER

## 2024-05-09 PROCEDURE — 82150 ASSAY OF AMYLASE: CPT | Performed by: NURSE PRACTITIONER

## 2024-05-10 ENCOUNTER — HOSPITAL ENCOUNTER (OUTPATIENT)
Dept: ULTRASOUND IMAGING | Facility: HOSPITAL | Age: 54
Discharge: HOME | End: 2024-05-10
Admitting: NURSE PRACTITIONER
Payer: COMMERCIAL

## 2024-05-10 DIAGNOSIS — K21.9 GASTROESOPHAGEAL REFLUX DISEASE WITHOUT ESOPHAGITIS: Primary | ICD-10-CM

## 2024-05-10 DIAGNOSIS — R14.0 BLOATING: ICD-10-CM

## 2024-05-10 DIAGNOSIS — R10.9 ABDOMINAL PAIN, UNSPECIFIED ABDOMINAL LOCATION: ICD-10-CM

## 2024-05-10 DIAGNOSIS — R19.7 DIARRHEA, UNSPECIFIED TYPE: ICD-10-CM

## 2024-05-10 DIAGNOSIS — K21.9 GASTROESOPHAGEAL REFLUX DISEASE WITHOUT ESOPHAGITIS: ICD-10-CM

## 2024-05-10 DIAGNOSIS — R68.81 EARLY SATIETY: ICD-10-CM

## 2024-05-10 DIAGNOSIS — K82.4 GALLBLADDER POLYP: Primary | ICD-10-CM

## 2024-05-10 LAB
GLIADIN PEPTIDE IGA SER-ACNC: 9 UNITS (ref 0–19)
IGA SERPL-MCNC: 331 MG/DL (ref 87–352)
TTG IGA SER-ACNC: <2 U/ML (ref 0–3)

## 2024-05-10 PROCEDURE — 76705 ECHO EXAM OF ABDOMEN: CPT

## 2024-05-21 ENCOUNTER — LAB (OUTPATIENT)
Dept: INTERNAL MEDICINE | Facility: CLINIC | Age: 54
End: 2024-05-21
Payer: COMMERCIAL

## 2024-05-21 DIAGNOSIS — Z01.818 PREOP TESTING: Primary | ICD-10-CM

## 2024-05-21 DIAGNOSIS — Z01.818 PREOP TESTING: ICD-10-CM

## 2024-05-21 LAB
ALBUMIN SERPL-MCNC: 4.3 G/DL (ref 3.5–5.2)
ALBUMIN/GLOB SERPL: 1.5 G/DL
ALP SERPL-CCNC: 105 U/L (ref 39–117)
ALT SERPL W P-5'-P-CCNC: 21 U/L (ref 1–33)
ANION GAP SERPL CALCULATED.3IONS-SCNC: 12.3 MMOL/L (ref 5–15)
AST SERPL-CCNC: 24 U/L (ref 1–32)
BASOPHILS # BLD AUTO: 0.04 10*3/MM3 (ref 0–0.2)
BASOPHILS NFR BLD AUTO: 0.9 % (ref 0–1.5)
BILIRUB SERPL-MCNC: 0.4 MG/DL (ref 0–1.2)
BUN SERPL-MCNC: 15 MG/DL (ref 6–20)
BUN/CREAT SERPL: 18.8 (ref 7–25)
CALCIUM SPEC-SCNC: 9.5 MG/DL (ref 8.6–10.5)
CHLORIDE SERPL-SCNC: 104 MMOL/L (ref 98–107)
CO2 SERPL-SCNC: 21.7 MMOL/L (ref 22–29)
CREAT SERPL-MCNC: 0.8 MG/DL (ref 0.57–1)
DEPRECATED RDW RBC AUTO: 41.4 FL (ref 37–54)
EGFRCR SERPLBLD CKD-EPI 2021: 86.6 ML/MIN/1.73
EOSINOPHIL # BLD AUTO: 0.19 10*3/MM3 (ref 0–0.4)
EOSINOPHIL NFR BLD AUTO: 4.1 % (ref 0.3–6.2)
ERYTHROCYTE [DISTWIDTH] IN BLOOD BY AUTOMATED COUNT: 12.6 % (ref 12.3–15.4)
GLOBULIN UR ELPH-MCNC: 2.9 GM/DL
GLUCOSE SERPL-MCNC: 105 MG/DL (ref 65–99)
HCT VFR BLD AUTO: 44.5 % (ref 34–46.6)
HGB BLD-MCNC: 15 G/DL (ref 12–15.9)
IMM GRANULOCYTES # BLD AUTO: 0.01 10*3/MM3 (ref 0–0.05)
IMM GRANULOCYTES NFR BLD AUTO: 0.2 % (ref 0–0.5)
LYMPHOCYTES # BLD AUTO: 1.65 10*3/MM3 (ref 0.7–3.1)
LYMPHOCYTES NFR BLD AUTO: 35.4 % (ref 19.6–45.3)
MCH RBC QN AUTO: 30.4 PG (ref 26.6–33)
MCHC RBC AUTO-ENTMCNC: 33.7 G/DL (ref 31.5–35.7)
MCV RBC AUTO: 90.1 FL (ref 79–97)
MONOCYTES # BLD AUTO: 0.32 10*3/MM3 (ref 0.1–0.9)
MONOCYTES NFR BLD AUTO: 6.9 % (ref 5–12)
NEUTROPHILS NFR BLD AUTO: 2.45 10*3/MM3 (ref 1.7–7)
NEUTROPHILS NFR BLD AUTO: 52.5 % (ref 42.7–76)
NRBC BLD AUTO-RTO: 0 /100 WBC (ref 0–0.2)
PLATELET # BLD AUTO: 319 10*3/MM3 (ref 140–450)
PMV BLD AUTO: 9.7 FL (ref 6–12)
POTASSIUM SERPL-SCNC: 3.8 MMOL/L (ref 3.5–5.2)
PROT SERPL-MCNC: 7.2 G/DL (ref 6–8.5)
RBC # BLD AUTO: 4.94 10*6/MM3 (ref 3.77–5.28)
SODIUM SERPL-SCNC: 138 MMOL/L (ref 136–145)
WBC NRBC COR # BLD AUTO: 4.66 10*3/MM3 (ref 3.4–10.8)

## 2024-05-21 PROCEDURE — 36415 COLL VENOUS BLD VENIPUNCTURE: CPT | Performed by: NURSE PRACTITIONER

## 2024-05-21 PROCEDURE — 80053 COMPREHEN METABOLIC PANEL: CPT | Performed by: NURSE PRACTITIONER

## 2024-05-21 PROCEDURE — 85025 COMPLETE CBC W/AUTO DIFF WBC: CPT | Performed by: NURSE PRACTITIONER

## 2024-05-28 ENCOUNTER — HOSPITAL ENCOUNTER (OUTPATIENT)
Facility: HOSPITAL | Age: 54
Setting detail: HOSPITAL OUTPATIENT SURGERY
Discharge: HOME | End: 2024-05-28
Attending: STUDENT IN AN ORGANIZED HEALTH CARE EDUCATION/TRAINING PROGRAM | Admitting: STUDENT IN AN ORGANIZED HEALTH CARE EDUCATION/TRAINING PROGRAM
Payer: COMMERCIAL

## 2024-05-28 ENCOUNTER — ANESTHESIA (OUTPATIENT)
Dept: PERIOP | Facility: HOSPITAL | Age: 54
End: 2024-05-28
Payer: COMMERCIAL

## 2024-05-28 ENCOUNTER — ANESTHESIA EVENT (OUTPATIENT)
Dept: PERIOP | Facility: HOSPITAL | Age: 54
End: 2024-05-28
Payer: COMMERCIAL

## 2024-05-28 VITALS
HEART RATE: 66 BPM | BODY MASS INDEX: 28.99 KG/M2 | HEIGHT: 65 IN | OXYGEN SATURATION: 99 % | DIASTOLIC BLOOD PRESSURE: 62 MMHG | WEIGHT: 174 LBS | RESPIRATION RATE: 16 BRPM | SYSTOLIC BLOOD PRESSURE: 119 MMHG | TEMPERATURE: 98.8 F

## 2024-05-28 DIAGNOSIS — K82.4 GALLBLADDER POLYP: ICD-10-CM

## 2024-05-28 PROCEDURE — 25010000002 KETOROLAC TROMETHAMINE PER 15 MG: Performed by: NURSE ANESTHETIST, CERTIFIED REGISTERED

## 2024-05-28 PROCEDURE — 25010000002 PROPOFOL 10 MG/ML EMULSION: Performed by: NURSE ANESTHETIST, CERTIFIED REGISTERED

## 2024-05-28 PROCEDURE — 25010000002 DEXAMETHASONE PER 1 MG: Performed by: NURSE ANESTHETIST, CERTIFIED REGISTERED

## 2024-05-28 PROCEDURE — 25010000002 SUGAMMADEX 200 MG/2ML SOLUTION: Performed by: NURSE ANESTHETIST, CERTIFIED REGISTERED

## 2024-05-28 PROCEDURE — 25010000002 CEFAZOLIN PER 500 MG: Performed by: STUDENT IN AN ORGANIZED HEALTH CARE EDUCATION/TRAINING PROGRAM

## 2024-05-28 PROCEDURE — 88304 TISSUE EXAM BY PATHOLOGIST: CPT | Performed by: STUDENT IN AN ORGANIZED HEALTH CARE EDUCATION/TRAINING PROGRAM

## 2024-05-28 PROCEDURE — 25810000003 LACTATED RINGERS PER 1000 ML: Performed by: ANESTHESIOLOGY

## 2024-05-28 PROCEDURE — 25010000002 ONDANSETRON PER 1 MG: Performed by: NURSE ANESTHETIST, CERTIFIED REGISTERED

## 2024-05-28 PROCEDURE — 25010000002 FENTANYL CITRATE (PF) 100 MCG/2ML SOLUTION: Performed by: NURSE ANESTHETIST, CERTIFIED REGISTERED

## 2024-05-28 PROCEDURE — 25810000003 SODIUM CHLORIDE PER 500 ML: Performed by: STUDENT IN AN ORGANIZED HEALTH CARE EDUCATION/TRAINING PROGRAM

## 2024-05-28 DEVICE — LIGACLIP 10-M/L, 10MM ENDOSCOPIC ROTATING MULTIPLE CLIP APPLIERS
Type: IMPLANTABLE DEVICE | Site: BILE DUCT | Status: FUNCTIONAL
Brand: LIGACLIP

## 2024-05-28 RX ORDER — MIDAZOLAM HYDROCHLORIDE 1 MG/ML
1 INJECTION INTRAMUSCULAR; INTRAVENOUS
Status: DISCONTINUED | OUTPATIENT
Start: 2024-05-28 | End: 2024-05-28 | Stop reason: HOSPADM

## 2024-05-28 RX ORDER — PROMETHAZINE HYDROCHLORIDE 25 MG/1
25 TABLET ORAL ONCE AS NEEDED
Status: DISCONTINUED | OUTPATIENT
Start: 2024-05-28 | End: 2024-05-28 | Stop reason: HOSPADM

## 2024-05-28 RX ORDER — PHENYLEPHRINE HCL IN 0.9% NACL 1 MG/10 ML
SYRINGE (ML) INTRAVENOUS AS NEEDED
Status: DISCONTINUED | OUTPATIENT
Start: 2024-05-28 | End: 2024-05-28 | Stop reason: SURG

## 2024-05-28 RX ORDER — HYDROMORPHONE HYDROCHLORIDE 1 MG/ML
0.5 INJECTION, SOLUTION INTRAMUSCULAR; INTRAVENOUS; SUBCUTANEOUS
Status: DISCONTINUED | OUTPATIENT
Start: 2024-05-28 | End: 2024-05-28 | Stop reason: HOSPADM

## 2024-05-28 RX ORDER — LIDOCAINE HYDROCHLORIDE 10 MG/ML
INJECTION, SOLUTION EPIDURAL; INFILTRATION; INTRACAUDAL; PERINEURAL AS NEEDED
Status: DISCONTINUED | OUTPATIENT
Start: 2024-05-28 | End: 2024-05-28 | Stop reason: SURG

## 2024-05-28 RX ORDER — OXYCODONE HYDROCHLORIDE AND ACETAMINOPHEN 5; 325 MG/1; MG/1
1 TABLET ORAL ONCE AS NEEDED
Status: DISCONTINUED | OUTPATIENT
Start: 2024-05-28 | End: 2024-05-28 | Stop reason: HOSPADM

## 2024-05-28 RX ORDER — SODIUM CHLORIDE 0.9 % (FLUSH) 0.9 %
10 SYRINGE (ML) INJECTION AS NEEDED
Status: CANCELLED | OUTPATIENT
Start: 2024-05-28

## 2024-05-28 RX ORDER — ROCURONIUM BROMIDE 10 MG/ML
INJECTION, SOLUTION INTRAVENOUS AS NEEDED
Status: DISCONTINUED | OUTPATIENT
Start: 2024-05-28 | End: 2024-05-28 | Stop reason: SURG

## 2024-05-28 RX ORDER — ONDANSETRON 2 MG/ML
4 INJECTION INTRAMUSCULAR; INTRAVENOUS ONCE AS NEEDED
Status: DISCONTINUED | OUTPATIENT
Start: 2024-05-28 | End: 2024-05-28 | Stop reason: HOSPADM

## 2024-05-28 RX ORDER — DEXAMETHASONE SODIUM PHOSPHATE 4 MG/ML
INJECTION, SOLUTION INTRA-ARTICULAR; INTRALESIONAL; INTRAMUSCULAR; INTRAVENOUS; SOFT TISSUE AS NEEDED
Status: DISCONTINUED | OUTPATIENT
Start: 2024-05-28 | End: 2024-05-28 | Stop reason: SURG

## 2024-05-28 RX ORDER — SODIUM CHLORIDE 9 MG/ML
INJECTION, SOLUTION INTRAVENOUS AS NEEDED
Status: DISCONTINUED | OUTPATIENT
Start: 2024-05-28 | End: 2024-05-28 | Stop reason: HOSPADM

## 2024-05-28 RX ORDER — TRAMADOL HYDROCHLORIDE 50 MG/1
25 TABLET ORAL EVERY 6 HOURS PRN
Qty: 10 TABLET | Refills: 0 | Status: SHIPPED | OUTPATIENT
Start: 2024-05-28

## 2024-05-28 RX ORDER — LIDOCAINE HYDROCHLORIDE 10 MG/ML
0.5 INJECTION, SOLUTION EPIDURAL; INFILTRATION; INTRACAUDAL; PERINEURAL ONCE AS NEEDED
Status: COMPLETED | OUTPATIENT
Start: 2024-05-28 | End: 2024-05-28

## 2024-05-28 RX ORDER — LABETALOL HYDROCHLORIDE 5 MG/ML
5 INJECTION, SOLUTION INTRAVENOUS
Status: DISCONTINUED | OUTPATIENT
Start: 2024-05-28 | End: 2024-05-28 | Stop reason: HOSPADM

## 2024-05-28 RX ORDER — SODIUM CHLORIDE 9 MG/ML
40 INJECTION, SOLUTION INTRAVENOUS AS NEEDED
Status: DISCONTINUED | OUTPATIENT
Start: 2024-05-28 | End: 2024-05-28 | Stop reason: HOSPADM

## 2024-05-28 RX ORDER — FAMOTIDINE 20 MG/1
20 TABLET, FILM COATED ORAL ONCE
Status: COMPLETED | OUTPATIENT
Start: 2024-05-28 | End: 2024-05-28

## 2024-05-28 RX ORDER — FENTANYL CITRATE 50 UG/ML
INJECTION, SOLUTION INTRAMUSCULAR; INTRAVENOUS AS NEEDED
Status: DISCONTINUED | OUTPATIENT
Start: 2024-05-28 | End: 2024-05-28 | Stop reason: SURG

## 2024-05-28 RX ORDER — SODIUM CHLORIDE 9 MG/ML
40 INJECTION, SOLUTION INTRAVENOUS AS NEEDED
Status: CANCELLED | OUTPATIENT
Start: 2024-05-28

## 2024-05-28 RX ORDER — NALOXONE HCL 0.4 MG/ML
0.4 VIAL (ML) INJECTION AS NEEDED
Status: DISCONTINUED | OUTPATIENT
Start: 2024-05-28 | End: 2024-05-28 | Stop reason: HOSPADM

## 2024-05-28 RX ORDER — IPRATROPIUM BROMIDE AND ALBUTEROL SULFATE 2.5; .5 MG/3ML; MG/3ML
3 SOLUTION RESPIRATORY (INHALATION) ONCE AS NEEDED
Status: DISCONTINUED | OUTPATIENT
Start: 2024-05-28 | End: 2024-05-28 | Stop reason: HOSPADM

## 2024-05-28 RX ORDER — KETOROLAC TROMETHAMINE 30 MG/ML
INJECTION, SOLUTION INTRAMUSCULAR; INTRAVENOUS AS NEEDED
Status: DISCONTINUED | OUTPATIENT
Start: 2024-05-28 | End: 2024-05-28 | Stop reason: SURG

## 2024-05-28 RX ORDER — ONDANSETRON 2 MG/ML
INJECTION INTRAMUSCULAR; INTRAVENOUS AS NEEDED
Status: DISCONTINUED | OUTPATIENT
Start: 2024-05-28 | End: 2024-05-28 | Stop reason: SURG

## 2024-05-28 RX ORDER — PROMETHAZINE HYDROCHLORIDE 25 MG/1
25 SUPPOSITORY RECTAL ONCE AS NEEDED
Status: DISCONTINUED | OUTPATIENT
Start: 2024-05-28 | End: 2024-05-28 | Stop reason: HOSPADM

## 2024-05-28 RX ORDER — DROPERIDOL 2.5 MG/ML
0.62 INJECTION, SOLUTION INTRAMUSCULAR; INTRAVENOUS
Status: DISCONTINUED | OUTPATIENT
Start: 2024-05-28 | End: 2024-05-28 | Stop reason: HOSPADM

## 2024-05-28 RX ORDER — BUPIVACAINE HYDROCHLORIDE AND EPINEPHRINE 2.5; 5 MG/ML; UG/ML
INJECTION, SOLUTION INFILTRATION; PERINEURAL AS NEEDED
Status: DISCONTINUED | OUTPATIENT
Start: 2024-05-28 | End: 2024-05-28 | Stop reason: HOSPADM

## 2024-05-28 RX ORDER — SODIUM CHLORIDE 0.9 % (FLUSH) 0.9 %
3 SYRINGE (ML) INJECTION EVERY 12 HOURS SCHEDULED
Status: DISCONTINUED | OUTPATIENT
Start: 2024-05-28 | End: 2024-05-28 | Stop reason: HOSPADM

## 2024-05-28 RX ORDER — PROPOFOL 10 MG/ML
VIAL (ML) INTRAVENOUS AS NEEDED
Status: DISCONTINUED | OUTPATIENT
Start: 2024-05-28 | End: 2024-05-28 | Stop reason: SURG

## 2024-05-28 RX ORDER — DEXMEDETOMIDINE HYDROCHLORIDE 4 UG/ML
INJECTION, SOLUTION INTRAVENOUS AS NEEDED
Status: DISCONTINUED | OUTPATIENT
Start: 2024-05-28 | End: 2024-05-28 | Stop reason: SURG

## 2024-05-28 RX ORDER — SODIUM CHLORIDE, SODIUM LACTATE, POTASSIUM CHLORIDE, CALCIUM CHLORIDE 600; 310; 30; 20 MG/100ML; MG/100ML; MG/100ML; MG/100ML
9 INJECTION, SOLUTION INTRAVENOUS CONTINUOUS
Status: DISCONTINUED | OUTPATIENT
Start: 2024-05-28 | End: 2024-05-28 | Stop reason: HOSPADM

## 2024-05-28 RX ORDER — HYDRALAZINE HYDROCHLORIDE 20 MG/ML
5 INJECTION INTRAMUSCULAR; INTRAVENOUS
Status: DISCONTINUED | OUTPATIENT
Start: 2024-05-28 | End: 2024-05-28 | Stop reason: HOSPADM

## 2024-05-28 RX ORDER — DROPERIDOL 2.5 MG/ML
0.62 INJECTION, SOLUTION INTRAMUSCULAR; INTRAVENOUS ONCE AS NEEDED
Status: DISCONTINUED | OUTPATIENT
Start: 2024-05-28 | End: 2024-05-28 | Stop reason: HOSPADM

## 2024-05-28 RX ORDER — FAMOTIDINE 10 MG/ML
20 INJECTION, SOLUTION INTRAVENOUS ONCE
Status: CANCELLED | OUTPATIENT
Start: 2024-05-28 | End: 2024-05-28

## 2024-05-28 RX ORDER — LEVOTHYROXINE SODIUM 0.12 MG/1
125 TABLET ORAL DAILY
Qty: 30 TABLET | Refills: 8 | Status: SHIPPED | OUTPATIENT
Start: 2024-05-28

## 2024-05-28 RX ORDER — FENTANYL CITRATE 50 UG/ML
50 INJECTION, SOLUTION INTRAMUSCULAR; INTRAVENOUS
Status: DISCONTINUED | OUTPATIENT
Start: 2024-05-28 | End: 2024-05-28 | Stop reason: HOSPADM

## 2024-05-28 RX ORDER — SODIUM CHLORIDE 0.9 % (FLUSH) 0.9 %
10 SYRINGE (ML) INJECTION EVERY 12 HOURS SCHEDULED
Status: CANCELLED | OUTPATIENT
Start: 2024-05-28

## 2024-05-28 RX ORDER — MEPERIDINE HYDROCHLORIDE 25 MG/ML
12.5 INJECTION INTRAMUSCULAR; INTRAVENOUS; SUBCUTANEOUS
Status: DISCONTINUED | OUTPATIENT
Start: 2024-05-28 | End: 2024-05-28 | Stop reason: HOSPADM

## 2024-05-28 RX ORDER — SODIUM CHLORIDE 0.9 % (FLUSH) 0.9 %
3-10 SYRINGE (ML) INJECTION AS NEEDED
Status: DISCONTINUED | OUTPATIENT
Start: 2024-05-28 | End: 2024-05-28 | Stop reason: HOSPADM

## 2024-05-28 RX ADMIN — SODIUM CHLORIDE 2000 MG: 900 INJECTION INTRAVENOUS at 15:25

## 2024-05-28 RX ADMIN — LIDOCAINE HYDROCHLORIDE 80 MG: 10 INJECTION, SOLUTION EPIDURAL; INFILTRATION; INTRACAUDAL; PERINEURAL at 15:20

## 2024-05-28 RX ADMIN — DEXMEDETOMIDINE HYDROCHLORIDE IN 0.9% SODIUM CHLORIDE 4 MCG: 4 INJECTION INTRAVENOUS at 15:23

## 2024-05-28 RX ADMIN — DEXMEDETOMIDINE HYDROCHLORIDE IN 0.9% SODIUM CHLORIDE 4 MCG: 4 INJECTION INTRAVENOUS at 15:48

## 2024-05-28 RX ADMIN — LIDOCAINE HYDROCHLORIDE 0.5 ML: 10 INJECTION, SOLUTION EPIDURAL; INFILTRATION; INTRACAUDAL; PERINEURAL at 13:25

## 2024-05-28 RX ADMIN — DEXMEDETOMIDINE HYDROCHLORIDE IN 0.9% SODIUM CHLORIDE 4 MCG: 4 INJECTION INTRAVENOUS at 15:36

## 2024-05-28 RX ADMIN — FENTANYL CITRATE 100 MCG: 50 INJECTION, SOLUTION INTRAMUSCULAR; INTRAVENOUS at 16:11

## 2024-05-28 RX ADMIN — PROPOFOL 200 MG: 10 INJECTION, EMULSION INTRAVENOUS at 15:20

## 2024-05-28 RX ADMIN — SODIUM CHLORIDE, POTASSIUM CHLORIDE, SODIUM LACTATE AND CALCIUM CHLORIDE 9 ML/HR: 600; 310; 30; 20 INJECTION, SOLUTION INTRAVENOUS at 13:24

## 2024-05-28 RX ADMIN — Medication 100 MCG: at 15:41

## 2024-05-28 RX ADMIN — DEXAMETHASONE SODIUM PHOSPHATE 8 MG: 4 INJECTION INTRA-ARTICULAR; INTRALESIONAL; INTRAMUSCULAR; INTRAVENOUS; SOFT TISSUE at 15:25

## 2024-05-28 RX ADMIN — ONDANSETRON 4 MG: 2 INJECTION INTRAMUSCULAR; INTRAVENOUS at 15:57

## 2024-05-28 RX ADMIN — ROCURONIUM BROMIDE 40 MG: 10 INJECTION INTRAVENOUS at 15:20

## 2024-05-28 RX ADMIN — FAMOTIDINE 20 MG: 20 TABLET, FILM COATED ORAL at 13:25

## 2024-05-28 RX ADMIN — SUGAMMADEX 200 MG: 100 INJECTION, SOLUTION INTRAVENOUS at 16:08

## 2024-05-28 RX ADMIN — FENTANYL CITRATE 100 MCG: 50 INJECTION, SOLUTION INTRAMUSCULAR; INTRAVENOUS at 15:20

## 2024-05-28 RX ADMIN — KETOROLAC TROMETHAMINE 30 MG: 30 INJECTION, SOLUTION INTRAMUSCULAR; INTRAVENOUS at 15:57

## 2024-05-28 NOTE — ANESTHESIA PROCEDURE NOTES
Airway  Urgency: elective    Date/Time: 5/28/2024 3:22 PM  Airway not difficult    General Information and Staff    Patient location during procedure: OR  CRNA/CAA: Bing Novak CRNA    Indications and Patient Condition  Indications for airway management: airway protection    Preoxygenated: yes  MILS not maintained throughout  Mask difficulty assessment: 1 - vent by mask    Final Airway Details  Final airway type: endotracheal airway      Successful airway: ETT  Cuffed: yes   Successful intubation technique: direct laryngoscopy  Endotracheal tube insertion site: oral  Blade: Yancy  Blade size: 3  ETT size (mm): 7.0  Cormack-Lehane Classification: grade I - full view of glottis  Placement verified by: chest auscultation and capnometry   Measured from: lips  ETT/EBT  to lips (cm): 21  Number of attempts at approach: 1  Assessment: lips, teeth, and gum same as pre-op and atraumatic intubation    Additional Comments  Negative epigastric sounds, Breath sound equal bilaterally with symmetric chest rise and fall

## 2024-05-28 NOTE — TELEPHONE ENCOUNTER
Rx Refill Note  Requested Prescriptions     Pending Prescriptions Disp Refills    levothyroxine (Synthroid) 125 MCG tablet 30 tablet 6     Sig: Take 1 tablet by mouth Daily.      Last office visit with prescribing clinician: 3/22/2024   Last telemedicine visit with prescribing clinician: Visit date not found   Next office visit with prescribing clinician: 3/26/2025                         Would you like a call back once the refill request has been completed: [] Yes [] No    If the office needs to give you a call back, can they leave a voicemail: [] Yes [] No    Valentina García MA  05/28/24, 08:55 EDT

## 2024-05-28 NOTE — ANESTHESIA POSTPROCEDURE EVALUATION
Patient: Joyce Orona    Procedure Summary       Date: 05/28/24 Room / Location:  [facility] [address] [place] [address] [facility]    Anesthesia Start: 1515 Anesthesia Stop:     Procedure: CHOLECYSTECTOMY LAPAROSCOPIC (Abdomen) Diagnosis:     Surgeons: Matias Driver MD Provider: Ke Chatman MD    Anesthesia Type: general ASA Status: 2            Anesthesia Type: general    Vitals  No vitals data found for the desired time range.          Post Anesthesia Care and Evaluation    Patient location during evaluation: PACU  Patient participation: complete - patient participated  Level of consciousness: awake and alert  Pain management: adequate    Airway patency: patent  Anesthetic complications: No anesthetic complications  PONV Status: none  Cardiovascular status: hemodynamically stable and acceptable  Respiratory status: nonlabored ventilation, acceptable and nasal cannula  Hydration status: acceptable

## 2024-05-28 NOTE — OP NOTE
OPERATIVE NOTE    Patient Name:  Joyce Orona  YOB: 1970  [id number]     Date of Surgery:  5/28/2024       Pre-op Diagnosis: Gallbladder polyp    Post-op Diagnosis: Gallbladder polyp      Procedure:   Laparoscopic cholecystectomy    Surgeon: Matias Driver MD MS    Assistant:   None    Anesthesia: General    Estimated Blood Loss: minimal    Complications: None apparent    Implants:    Implant Name Type Inv. Item Serial No.  Lot No. LRB No. Used Action   CLIPAPPLR M/ ENDO LIGACLIP ROT 10MM MD/LG - [id number] - [id number] Implant CLIPAPPLR M/ ENDO LIGACLIP ROT 10MM MD/AIDAN [id number] [employer]  DIV OF J AND J [id number] N/A 1 Implanted       Specimen:          Specimens       ID Source Type Tests Collected By Collected At Frozen?    A Gallbladder Tissue TISSUE PATHOLOGY EXAM   Matias Driver MD 5/28/24 1554 No    This specimen was not marked as sent.              Findings: Normal appearing gallbladder, wide-mouth cystic duct    Indications:  Mrs. Joyce Orona is a 54 year old woman presenting with biliary colic in the setting of a gallbladder polyp. Due to this, it was decided she should be taken to the OR for laparoscopic cholecystectomy.     The risks and benefits of the procedure were discussed including, but not limited to: bleeding, infection, injury to adjacent structures, need for re-intervention (operative intervention, drain placement, etc.), and medical complications due to the patient's underlying co-morbidities and the risks of general anesthesia. The risks of bile leak and CBD injury were reviewed which are specific to this procedure.     The patient wishes to proceed and consented for surgery. All of the patient's questions were answered.       Description of Procedure:   After informed consent was obtained, patient identification verified, and pre-operative checklist completed, the patient was brought to the Operating Room and placed comfortably in the supine  position on the operating table.      Intraoperatively, the patient was given Ancef as antimicrobial prophylaxis for surgical site infection.    General anesthesia was administered without complication and the patient was intubated without difficulty.  The patient’s abdomen was clipped, prepped with Chloraprep solution, and draped in the usual sterile fashion.    After an appropriate surgical pause and time-out was completed, a curvilinear incision was made in a natural skin line in the supraumbilical area after local anesthetic was infiltrated at the proposed incision site.       The fascia was elevated and the Veress needle was inserted.  Proper position was confirmed by aspiration and saline meniscus test. The abdomen was insufflated with carbon dioxide to a pressure of 12-15 mmHg.  The patient tolerated insufflation well.  A 5 mm trocar was then inserted at the umbilicus using the OptiView technique.    The 5 mm, 30 degree laparoscope was inserted and the abdomen was inspected.  No injuries from initial trocar placement were noted.  Under direct visualization, additional trocars were then inserted in the following locations:  a 12 mm trocar in the right epigastrium and two 5 mm trocars along the right costal margin.  The table was placed in the reverse Trendelenburg position with the right side elevated.    Filmy adhesions between the gallbladder, omentum, and duodenum were lysed sharply.  The dome of the gallbladder was grasped with an atraumatic grasper passed through the lateral port and retracted over the dome of the liver.  The infundibulum was also grasped with an atraumatic grasper through the mid-clavicular port and retracted towards the right lower quadrant.  This maneuver exposed Calot’s triangle.  The peritoneum overlying the gallbladder infundibulum was then incised and the cystic duct and cystic artery were identified and circumferentially dissected.  A critical view was obtained. The cystic duct  and cystic artery were then doubly clipped and divided close to the gallbladder. The cystic duct was wide thus it was reinforced with an endoloop.    The gallbladder was then dissected from its peritoneal attachments by electrocautery.  Adequate hemostasis was checked and achieved and the gallbladder was removed using an endoscopic retrieval bag placed through the 12 mm port.  The gallbladder and contents were passed off the operative field and sent to Pathology as a permanent specimen.  The gallbladder fossa was inspected and adequate hemostasis was again checked and achieved.  There was no evidence of bleeding of the gallbladder fossa or cystic artery or leakage of bile from the cystic duct stump.    The fascia at the 12 mm port site was closed with an interrupted 0-Vicryl suture using an Endo Close device. Secondary trocars were removed under direct vision and no bleeding was noted.  The laparoscope was withdrawn and the umbilical trocar removed.  The abdomen was allowed to collapse. The skin was closed with subcuticular sutures of 4-0 Monocryl.  Adhesive glue was applied to all incision sites.    All sponge and needle counts were correct x2 at the completion of the procedure. The patient recovered from anesthesia, was extubated in the Operating Room and was transported to the PACU in stable condition.      Matias Driver MD     Date: 5/28/2024  Time: 16:16 EDT

## 2024-05-28 NOTE — INTERVAL H&P NOTE
"[facility] Pre-op    Full history and physical note from office is attached.    /99 (BP Location: Right arm, Patient Position: Lying)   Pulse 102   Temp 97.4 °F (36.3 °C) (Temporal)   Resp 16   Ht 165.1 cm (65\")   Wt 78.9 kg (174 lb)   LMP 12/16/2016   SpO2 97%   BMI 28.96 kg/m²     Lungs: Clear to auscultation bilaterally, respirations unlabored  Heart:   Regular rate and rhythm, S1 and S2 normal    LAB Results:  Lab Results   Component Value Date    WBC 4.66 05/21/2024    HGB 15.0 05/21/2024    HCT 44.5 05/21/2024    MCV 90.1 05/21/2024     05/21/2024    NEUTROABS 2.45 05/21/2024    GLUCOSE 105 (H) 05/21/2024    BUN 15 05/21/2024    CREATININE 0.80 05/21/2024    EGFRIFNONA 81 12/20/2021    EGFRIFAFRI 105 06/12/2015     05/21/2024    K 3.8 05/21/2024     05/21/2024    CO2 21.7 (L) 05/21/2024    CALCIUM 9.5 05/21/2024    ALBUMIN 4.3 05/21/2024    AST 24 05/21/2024    ALT 21 05/21/2024    BILITOT 0.4 05/21/2024     Study Result    Narrative & Impression   US LIVER     Date of Exam: 5/10/2024 10:42 AM EDT     Indication: abd pain/diarrhea/need liver & GB visualization.     Comparison: No comparisons available.     Technique: Grayscale and color Doppler ultrasound evaluation of the right upper quadrant was performed.        Findings:  LIVER:  The liver appears increased in echogenicity.No focal lesions identified. Normal flow is present within the hepatic vasculature.      GALLBLADDER:  The gallbladder appears normal in size. There is a 3 mm polyp. No evidence of stones, wall thickening or pericholecystic fluid. The common bile duct appears normal in caliber.  No positive sonographic Edmond sign reported.     PANCREAS:  Visualized portions are unremarkable.     RIGHT KIDNEY:  Normal in size with no focal lesions. No evidence of nephrolithiasis or hydronephrosis. The right kidney measures 10.1 cm in [id number] length.     Impression:     1. Hepatic steatosis.  2. 3 mm " gallbladder polyp.     Cancer Staging (if applicable)  Cancer Patient: __ yes __no __unknown__N/A; If yes, clinical stage T:__ N:__M:__, stage group or __N/A      Impression: Gallbladder polyps      Plan: CHOLECYSTECTOMY LAPAROSCOPIC       Rupali Harding, ELVIA   5/28/2024   13:43 EDT

## 2024-05-28 NOTE — ANESTHESIA PREPROCEDURE EVALUATION
Anesthesia Evaluation     Patient summary reviewed and Nursing notes reviewed   NPO Solid Status: > 8 hours  NPO Liquid Status: > 8 hours           Airway   Mallampati: II  TM distance: >3 FB  Neck ROM: full  No difficulty expected  Dental - normal exam     Pulmonary     breath sounds clear to auscultation  Cardiovascular   Exercise tolerance: good (4-7 METS)    Rhythm: regular  Rate: normal        Neuro/Psych  GI/Hepatic/Renal/Endo      Musculoskeletal     Abdominal    Substance History      OB/GYN          Other                    Anesthesia Plan    ASA 2     general     intravenous induction     Anesthetic plan, risks, benefits, and alternatives have been provided, discussed and informed consent has been obtained with: patient.    CODE STATUS:

## 2024-06-27 ENCOUNTER — TELEPHONE (OUTPATIENT)
Dept: INTERNAL MEDICINE | Facility: CLINIC | Age: 54
End: 2024-06-27
Payer: COMMERCIAL

## 2024-06-27 DIAGNOSIS — B37.31 YEAST VAGINITIS: Primary | ICD-10-CM

## 2024-06-27 RX ORDER — AMOXICILLIN 500 MG/1
500 CAPSULE ORAL 3 TIMES DAILY
Qty: 30 CAPSULE | Refills: 0 | Status: SHIPPED | OUTPATIENT
Start: 2024-06-27

## 2024-06-27 RX ORDER — FLUCONAZOLE 150 MG/1
150 TABLET ORAL ONCE
Qty: 1 TABLET | Refills: 0 | Status: SHIPPED | OUTPATIENT
Start: 2024-06-27 | End: 2024-06-27

## 2024-06-27 NOTE — TELEPHONE ENCOUNTER
Patient dentist told her she has an abscess on her gum and needs some Amoxicillin to treat. [address] is preferred pharmacy today.

## 2024-06-27 NOTE — TELEPHONE ENCOUNTER
Pt notified. Verbalized understanding and appreciation. She would like to have Diflucan just incase.

## 2024-07-05 DIAGNOSIS — L30.4 INTERTRIGO: Primary | ICD-10-CM

## 2024-07-05 RX ORDER — CLOTRIMAZOLE AND BETAMETHASONE DIPROPIONATE 10; .64 MG/G; MG/G
1 CREAM TOPICAL 2 TIMES DAILY
Qty: 45 G | Refills: 0 | Status: SHIPPED | OUTPATIENT
Start: 2024-07-05

## 2024-07-08 DIAGNOSIS — T78.40XA ALLERGIC REACTION, INITIAL ENCOUNTER: Primary | ICD-10-CM

## 2024-07-08 PROCEDURE — 96372 THER/PROPH/DIAG INJ SC/IM: CPT | Performed by: NURSE PRACTITIONER

## 2024-07-08 RX ORDER — METHYLPREDNISOLONE ACETATE 40 MG/ML
40 INJECTION, SUSPENSION INTRA-ARTICULAR; INTRALESIONAL; INTRAMUSCULAR; SOFT TISSUE ONCE
Status: COMPLETED | OUTPATIENT
Start: 2024-07-08 | End: 2024-07-08

## 2024-07-08 RX ORDER — FLUCONAZOLE 150 MG/1
150 TABLET ORAL
Qty: 3 TABLET | Refills: 0 | Status: SHIPPED | OUTPATIENT
Start: 2024-07-08 | End: 2024-07-15

## 2024-07-08 RX ADMIN — METHYLPREDNISOLONE ACETATE 40 MG: 40 INJECTION, SUSPENSION INTRA-ARTICULAR; INTRALESIONAL; INTRAMUSCULAR; SOFT TISSUE at 10:22

## 2024-07-08 NOTE — PROGRESS NOTES
76 he has yeast infection under the right breast which she use Lotrisone cream for unfortunately the Lotrisone has caused allergic reaction will treat with steroid injection in office and she will do Pepcid twice daily as well advise antihistamine daily

## 2024-07-23 ENCOUNTER — OFFICE VISIT (OUTPATIENT)
Dept: INTERNAL MEDICINE | Facility: CLINIC | Age: 54
End: 2024-07-23
Payer: COMMERCIAL

## 2024-07-23 VITALS
RESPIRATION RATE: 14 BRPM | HEIGHT: 65 IN | WEIGHT: 168 LBS | OXYGEN SATURATION: 97 % | DIASTOLIC BLOOD PRESSURE: 84 MMHG | HEART RATE: 92 BPM | SYSTOLIC BLOOD PRESSURE: 114 MMHG | BODY MASS INDEX: 27.99 KG/M2 | TEMPERATURE: 96.6 F

## 2024-07-23 DIAGNOSIS — E55.9 VITAMIN D DEFICIENCY: ICD-10-CM

## 2024-07-23 DIAGNOSIS — Z00.00 ANNUAL PHYSICAL EXAM: Primary | ICD-10-CM

## 2024-07-23 DIAGNOSIS — Z78.0 POSTMENOPAUSAL: ICD-10-CM

## 2024-07-23 DIAGNOSIS — K21.9 GASTROESOPHAGEAL REFLUX DISEASE WITHOUT ESOPHAGITIS: Chronic | ICD-10-CM

## 2024-07-23 DIAGNOSIS — E89.0 POSTOPERATIVE HYPOTHYROIDISM: Chronic | ICD-10-CM

## 2024-07-23 DIAGNOSIS — Z85.850 HISTORY OF THYROID CANCER: Chronic | ICD-10-CM

## 2024-07-23 PROCEDURE — 99396 PREV VISIT EST AGE 40-64: CPT | Performed by: NURSE PRACTITIONER

## 2024-07-23 RX ORDER — CHOLECALCIFEROL (VITAMIN D3) 25 MCG
1000 TABLET ORAL DAILY
COMMUNITY

## 2024-07-25 LAB — REF LAB TEST METHOD: NORMAL

## 2024-07-28 NOTE — PROGRESS NOTES
Patient Care Team:  Trisha Aggarwal APRN as PCP - General (Nurse Practitioner)  Lucy Harris MD as Consulting Physician (Obstetrics and Gynecology)  Kimberly Vance MD as Consulting Physician (Endocrinology)  Lisandro Trujillo MD as Consulting Physician (Gastroenterology)     Chief Complaint   Patient presents with    Annual Exam     Pt is not fasting               Patient is a 54 y.o. female who presents for her yearly physical exam.     HPI     Daughter recently got  and moved out. Joyce dealing ok with the change.      Had cholecystectomy for gallbladder polyp. Path was benign.   Had heat rash/yeast on right chest. Allergic reaction to Lotrisone. All is resolved now.     GERD well controlled with Prevacid.     Hx thyroid cancer. Follow with endo. Last thyroid labs stable.   Alternating 125 mcg and 112 mcg of her Synthroid. Has to use name brand.     Vit d low in the past. She stopped taking her vit d and started having bone/joint pain and feeling awful. Once restarted vit D, symptoms resolved.     Diet: mix of healthy and unhealthy.   Walking daily, several miles.   Lost some weight recently. Thinks it is because her gallbladder surgery.       Health maintenance/lifestyle:  Health Maintenance   Topic Date Due    ZOSTER VACCINE (1 of 2) Never done    DXA SCAN  09/17/2020    ANNUAL PHYSICAL  09/10/2022    COVID-19 Vaccine (6 - 2023-24 season) 09/01/2023    COLORECTAL CANCER SCREENING  07/30/2024    INFLUENZA VACCINE  08/01/2024    MAMMOGRAM  12/26/2024    BMI FOLLOWUP  05/06/2025    PAP SMEAR  07/23/2027    TDAP/TD VACCINES (2 - Td or Tdap) 02/14/2028    HEPATITIS C SCREENING  Completed    Pneumococcal Vaccine 0-64  Aged Out     Immunization History   Administered Date(s) Administered    COVID-19 (MODERNA) 1st,2nd,3rd Dose Monovalent 10/13/2021    COVID-19 (PFIZER) BIVALENT 12+YRS 09/29/2022    COVID-19 (PFIZER) Purple Cap Monovalent 01/15/2021, 02/05/2021, 10/08/2021    Fluzone (or Fluarix &  Flulaval for VFC) >6mos 09/06/2020, 10/27/2022, 10/16/2023    Hepatitis A 09/13/2018, 03/13/2019    INFLUENZA SPLIT TRI 09/15/2021    Influenza, Unspecified 10/01/2017, 10/01/2018, 10/01/2019    Tdap 02/14/2018    Tetanus 01/01/2005    influenza Split 10/21/2021     Cancer-related family history includes Colon cancer in her paternal grandfather. There is no history of Breast cancer or Ovarian cancer.  Sexual activity questions deferred to the physician.  Social History     Tobacco Use   Smoking Status Never    Passive exposure: Never   Smokeless Tobacco Never     Social History     Substance and Sexual Activity   Alcohol Use No       History  Social History     Socioeconomic History    Marital status:    Tobacco Use    Smoking status: Never     Passive exposure: Never    Smokeless tobacco: Never   Vaping Use    Vaping status: Never Used   Substance and Sexual Activity    Alcohol use: No    Drug use: No    Sexual activity: Defer     Partners: Male     Birth control/protection: None     Comment:      Past Medical History:   Diagnosis Date    GERD (gastroesophageal reflux disease)     Hx of radiation therapy     Migraine     Occipital lymphadenopathy 01/26/2021    Otitis media 08/15/2016    Impression: 02/13/2015 - amoxil 875 mg bid x 10 days;     Thyroid cancer 05/2007    radioiodine tx     Tubular adenoma of colon 07/2019    Wears eyeglasses       Past Surgical History:   Procedure Laterality Date    CHOLECYSTECTOMY N/A 5/28/2024    Procedure: CHOLECYSTECTOMY LAPAROSCOPIC;  Surgeon: Matias Driver MD;  Location[facility];  Service: General;  Laterality: N/A;    COLONOSCOPY  07/30/2019    Dr. Trujillo, tubular adenoma    TONSILLECTOMY      TOTAL THYROIDECTOMY  05/2007    Dr. FALCON    VAGINAL DELIVERY      x1      Allergies   Allergen Reactions    Antihistamines, Chlorpheniramine-Type Palpitations     TABS.  Elevated heart rate      Shellfish-Derived Products Hives and Swelling     Throat swelling     Sulfa  "Antibiotics Swelling and Rash     Tongue swells     Lortab [Hydrocodone-Acetaminophen] Other (See Comments)     Involuntary Muscle Jerks    Codeine Rash    Lotrisone Lotion [Clotrimazole-Betamethasone] Rash      Family History   Problem Relation Age of Onset    Hypertension Mother     Diabetes type I Father     Kidney failure Father 42    No Known Problems Brother     Diabetes type I Daughter     Heart attack Maternal Grandmother 72    Arthritis Maternal Grandmother     Arthritis Paternal Grandmother     Arthritis Maternal Grandfather     Arthritis Paternal Grandfather     Heart attack Paternal Grandfather 72    Colon cancer Paternal Grandfather     Breast cancer Neg Hx     Ovarian cancer Neg Hx        Current Outpatient Medications:     Cholecalciferol 25 MCG (1000 UT) tablet, Take 1 tablet by mouth Daily., Disp: , Rfl:     ibuprofen (ADVIL,MOTRIN) 800 MG tablet, Take 1 tablet by mouth Every 6 (Six) Hours As Needed for Moderate Pain., Disp: 90 tablet, Rfl: 2    lansoprazole (Prevacid) 30 MG capsule, Take 1 capsule by mouth 2 (Two) Times a Day., Disp: 180 capsule, Rfl: 3    levothyroxine (Synthroid) 125 MCG tablet, Take 1 tablet by mouth Daily., Disp: 30 tablet, Rfl: 8                /84 (BP Location: Left arm, Patient Position: Sitting, Cuff Size: Adult)   Pulse 92   Temp 96.6 °F (35.9 °C) (Infrared)   Resp 14   Ht 165.1 cm (65\")   Wt 76.2 kg (168 lb)   LMP 12/16/2016   SpO2 97%   BMI 27.96 kg/m²       Physical Exam  Vitals and nursing note reviewed. Exam conducted with a chaperone present.   Constitutional:       General: She is not in acute distress.     Appearance: Normal appearance. She is well-developed. She is not ill-appearing, toxic-appearing or diaphoretic.   HENT:      Head: Normocephalic and atraumatic.      Right Ear: Tympanic membrane and external ear normal.      Left Ear: Tympanic membrane and external ear normal.      Nose: Nose normal.      Mouth/Throat:      Lips: Pink. No lesions. "      Mouth: Mucous membranes are moist.      Tongue: No lesions.      Palate: No mass.      Pharynx: Oropharynx is clear.   Eyes:      General: Lids are normal. No scleral icterus.        Right eye: No discharge.         Left eye: No discharge.      Conjunctiva/sclera: Conjunctivae normal.      Pupils: Pupils are equal, round, and reactive to light.   Neck:      Thyroid: No thyroid mass, thyromegaly or thyroid tenderness.      Vascular: No carotid bruit or JVD.      Trachea: No tracheal deviation.   Cardiovascular:      Rate and Rhythm: Normal rate and regular rhythm.      Chest Wall: PMI is not displaced. No thrill.      Pulses: Normal pulses.      Heart sounds: Normal heart sounds. No murmur heard.     No friction rub. No gallop.   Pulmonary:      Effort: Pulmonary effort is normal. No accessory muscle usage or respiratory distress.      Breath sounds: Normal breath sounds.   Chest:      Chest wall: No tenderness.   Breasts:     Breasts are symmetrical.      Right: No swelling, bleeding, inverted nipple, mass, nipple discharge, skin change or tenderness.      Left: No swelling, bleeding, inverted nipple, mass, nipple discharge, skin change or tenderness.   Abdominal:      General: Bowel sounds are normal. There is no distension.      Palpations: Abdomen is soft. There is no mass.      Tenderness: There is no abdominal tenderness.      Hernia: No hernia is present.   Genitourinary:     Exam position: Supine.      Pubic Area: No rash.       Labia:         Right: No rash or tenderness.         Left: No rash or tenderness.       Vagina: Normal.      Cervix: Normal.      Uterus: Normal.       Adnexa: Right adnexa normal and left adnexa normal.   Musculoskeletal:         General: No tenderness or deformity. Normal range of motion.      Cervical back: Normal range of motion and neck supple.      Right lower leg: No edema.      Left lower leg: No edema.   Lymphadenopathy:      Head:      Right side of head: No submental,  submandibular, tonsillar, preauricular, posterior auricular or occipital adenopathy.      Left side of head: No submental, submandibular, tonsillar, preauricular, posterior auricular or occipital adenopathy.      Cervical: No cervical adenopathy.   Skin:     General: Skin is warm and dry.      Coloration: Skin is not ashen, jaundiced, mottled or pale.      Findings: No erythema or rash.   Neurological:      General: No focal deficit present.      Mental Status: She is alert and oriented to person, place, and time.   Psychiatric:         Attention and Perception: Attention normal.         Mood and Affect: Mood and affect normal.         Speech: Speech normal.         Behavior: Behavior normal. Behavior is cooperative.         Thought Content: Thought content normal.         Cognition and Memory: Cognition normal.         Judgment: Judgment normal.                   Diagnoses and all orders for this visit:    1. Annual physical exam (Primary)  -     CBC (No Diff); Future  -     Comprehensive Metabolic Panel; Future  -     Lipid Panel; Future  -     TSH; Future  -     T4, free; Future  -     ; Future  -     Hemoglobin A1c; Future  -     LIQUID-BASED PAP SMEAR WITH HPV GENOTYPING REGARDLESS OF INTERPRETATION (SEE,COR,MAD); Future  -     LIQUID-BASED PAP SMEAR WITH HPV GENOTYPING REGARDLESS OF INTERPRETATION (SEE,COR,MAD)    2. Gastroesophageal reflux disease without esophagitis  -     CBC (No Diff); Future  -     Comprehensive Metabolic Panel; Future  -     Lipid Panel; Future  -     TSH; Future  -     T4, free; Future  -     ; Future  -     Hemoglobin A1c; Future  -     LIQUID-BASED PAP SMEAR WITH HPV GENOTYPING REGARDLESS OF INTERPRETATION (SEE,COR,MAD); Future  -     LIQUID-BASED PAP SMEAR WITH HPV GENOTYPING REGARDLESS OF INTERPRETATION (SEE,COR,MAD)    3. Postoperative hypothyroidism  -     CBC (No Diff); Future  -     Comprehensive Metabolic Panel; Future  -     Lipid Panel; Future  -     TSH;  Future  -     T4, free; Future  -     ; Future  -     Hemoglobin A1c; Future  -     LIQUID-BASED PAP SMEAR WITH HPV GENOTYPING REGARDLESS OF INTERPRETATION (SEE,COR,MAD); Future  -     LIQUID-BASED PAP SMEAR WITH HPV GENOTYPING REGARDLESS OF INTERPRETATION (SEE,COR,MAD)    4. History of thyroid cancer  -     CBC (No Diff); Future  -     Comprehensive Metabolic Panel; Future  -     Lipid Panel; Future  -     TSH; Future  -     T4, free; Future  -     ; Future  -     Hemoglobin A1c; Future  -     LIQUID-BASED PAP SMEAR WITH HPV GENOTYPING REGARDLESS OF INTERPRETATION (SEE,COR,MAD); Future  -     LIQUID-BASED PAP SMEAR WITH HPV GENOTYPING REGARDLESS OF INTERPRETATION (SEE,COR,MAD)    5. Postmenopausal  -     DEXA Bone Density Axial; Future  -     CBC (No Diff); Future  -     Comprehensive Metabolic Panel; Future  -     Lipid Panel; Future  -     TSH; Future  -     T4, free; Future  -     ; Future  -     Hemoglobin A1c; Future  -     LIQUID-BASED PAP SMEAR WITH HPV GENOTYPING REGARDLESS OF INTERPRETATION (SEE,COR,MAD); Future  -     LIQUID-BASED PAP SMEAR WITH HPV GENOTYPING REGARDLESS OF INTERPRETATION (SEE,COR,MAD)    6. Vitamin D deficiency        Pap completed today.   Flu vaccine due this fall.   Dexa ordered  Declined shingles and covid vaccines   Cont current meds.         Labs  ordered as above if indicated- will notify of results and treat accordingly. If patient has not received results within one week via mychart or letter, they will notify my office  Immunizations and screenings: preventative/screenings are up-to-date/addressed as noted in the HPI.  Counseling: The patient is advised to cont healthy diet, and walking daily.         Follow up: Return in about 1 year (around 7/23/2025) for Annual, and needs to return for labs within 1-2 weeks.  Plan of care discussed with pt. They verbalized understanding and agreement.     Electronically signed by : ELVIA Garcia   7/28/2024   12:21  EDT

## 2024-07-29 ENCOUNTER — LAB (OUTPATIENT)
Dept: INTERNAL MEDICINE | Facility: CLINIC | Age: 54
End: 2024-07-29
Payer: COMMERCIAL

## 2024-07-29 DIAGNOSIS — Z78.0 POSTMENOPAUSAL: ICD-10-CM

## 2024-07-29 DIAGNOSIS — K21.9 GASTROESOPHAGEAL REFLUX DISEASE WITHOUT ESOPHAGITIS: Chronic | ICD-10-CM

## 2024-07-29 DIAGNOSIS — Z85.850 HISTORY OF THYROID CANCER: Chronic | ICD-10-CM

## 2024-07-29 DIAGNOSIS — Z00.00 ANNUAL PHYSICAL EXAM: ICD-10-CM

## 2024-07-29 DIAGNOSIS — E89.0 POSTOPERATIVE HYPOTHYROIDISM: Chronic | ICD-10-CM

## 2024-07-29 LAB
ALBUMIN SERPL-MCNC: 4.4 G/DL (ref 3.5–5.2)
ALBUMIN/GLOB SERPL: 1.4 G/DL
ALP SERPL-CCNC: 113 U/L (ref 39–117)
ALT SERPL W P-5'-P-CCNC: 17 U/L (ref 1–33)
ANION GAP SERPL CALCULATED.3IONS-SCNC: 12.9 MMOL/L (ref 5–15)
AST SERPL-CCNC: 24 U/L (ref 1–32)
BILIRUB SERPL-MCNC: 0.3 MG/DL (ref 0–1.2)
BUN SERPL-MCNC: 15 MG/DL (ref 6–20)
BUN/CREAT SERPL: 18.3 (ref 7–25)
CALCIUM SPEC-SCNC: 9.7 MG/DL (ref 8.6–10.5)
CANCER AG125 SERPL QL: 7.5 U/ML (ref 0–38.1)
CHLORIDE SERPL-SCNC: 103 MMOL/L (ref 98–107)
CHOLEST SERPL-MCNC: 177 MG/DL (ref 0–200)
CO2 SERPL-SCNC: 24.1 MMOL/L (ref 22–29)
CREAT SERPL-MCNC: 0.82 MG/DL (ref 0.57–1)
DEPRECATED RDW RBC AUTO: 41 FL (ref 37–54)
EGFRCR SERPLBLD CKD-EPI 2021: 84.1 ML/MIN/1.73
ERYTHROCYTE [DISTWIDTH] IN BLOOD BY AUTOMATED COUNT: 12.4 % (ref 12.3–15.4)
GLOBULIN UR ELPH-MCNC: 3.1 GM/DL
GLUCOSE SERPL-MCNC: 83 MG/DL (ref 65–99)
HBA1C MFR BLD: 5.5 % (ref 4.8–5.6)
HCT VFR BLD AUTO: 43.6 % (ref 34–46.6)
HDLC SERPL-MCNC: 56 MG/DL (ref 40–60)
HGB BLD-MCNC: 14.5 G/DL (ref 12–15.9)
LDLC SERPL CALC-MCNC: 106 MG/DL (ref 0–100)
LDLC/HDLC SERPL: 1.87 {RATIO}
MCH RBC QN AUTO: 30.1 PG (ref 26.6–33)
MCHC RBC AUTO-ENTMCNC: 33.3 G/DL (ref 31.5–35.7)
MCV RBC AUTO: 90.5 FL (ref 79–97)
PLATELET # BLD AUTO: 323 10*3/MM3 (ref 140–450)
PMV BLD AUTO: 9.5 FL (ref 6–12)
POTASSIUM SERPL-SCNC: 3.7 MMOL/L (ref 3.5–5.2)
PROT SERPL-MCNC: 7.5 G/DL (ref 6–8.5)
RBC # BLD AUTO: 4.82 10*6/MM3 (ref 3.77–5.28)
SODIUM SERPL-SCNC: 140 MMOL/L (ref 136–145)
T4 FREE SERPL-MCNC: 1.5 NG/DL (ref 0.92–1.68)
TRIGL SERPL-MCNC: 82 MG/DL (ref 0–150)
TSH SERPL DL<=0.05 MIU/L-ACNC: 4.76 UIU/ML (ref 0.27–4.2)
VLDLC SERPL-MCNC: 15 MG/DL (ref 5–40)
WBC NRBC COR # BLD AUTO: 6.05 10*3/MM3 (ref 3.4–10.8)

## 2024-07-29 PROCEDURE — 36415 COLL VENOUS BLD VENIPUNCTURE: CPT | Performed by: NURSE PRACTITIONER

## 2024-07-29 PROCEDURE — 80050 GENERAL HEALTH PANEL: CPT | Performed by: NURSE PRACTITIONER

## 2024-07-29 PROCEDURE — 84439 ASSAY OF FREE THYROXINE: CPT | Performed by: NURSE PRACTITIONER

## 2024-07-29 PROCEDURE — 83036 HEMOGLOBIN GLYCOSYLATED A1C: CPT | Performed by: NURSE PRACTITIONER

## 2024-07-29 PROCEDURE — 86304 IMMUNOASSAY TUMOR CA 125: CPT | Performed by: NURSE PRACTITIONER

## 2024-07-29 PROCEDURE — 80061 LIPID PANEL: CPT | Performed by: NURSE PRACTITIONER

## 2024-10-07 ENCOUNTER — FLU SHOT (OUTPATIENT)
Dept: INTERNAL MEDICINE | Facility: CLINIC | Age: 54
End: 2024-10-07
Payer: COMMERCIAL

## 2024-10-07 DIAGNOSIS — Z23 NEED FOR INFLUENZA VACCINATION: Primary | ICD-10-CM

## 2024-10-07 PROCEDURE — 90656 IIV3 VACC NO PRSV 0.5 ML IM: CPT | Performed by: NURSE PRACTITIONER

## 2024-10-07 PROCEDURE — 90471 IMMUNIZATION ADMIN: CPT | Performed by: NURSE PRACTITIONER

## 2024-12-18 ENCOUNTER — LAB (OUTPATIENT)
Dept: INTERNAL MEDICINE | Facility: CLINIC | Age: 54
End: 2024-12-18
Payer: COMMERCIAL

## 2024-12-18 DIAGNOSIS — K21.9 GASTROESOPHAGEAL REFLUX DISEASE WITHOUT ESOPHAGITIS: Primary | Chronic | ICD-10-CM

## 2024-12-18 DIAGNOSIS — E55.9 VITAMIN D DEFICIENCY: ICD-10-CM

## 2024-12-18 DIAGNOSIS — Z85.850 HISTORY OF THYROID CANCER: Chronic | ICD-10-CM

## 2024-12-18 DIAGNOSIS — E89.0 POSTOPERATIVE HYPOTHYROIDISM: Chronic | ICD-10-CM

## 2024-12-18 DIAGNOSIS — K21.9 GASTROESOPHAGEAL REFLUX DISEASE WITHOUT ESOPHAGITIS: Chronic | ICD-10-CM

## 2024-12-18 LAB
25(OH)D3 SERPL-MCNC: 36.5 NG/ML (ref 30–100)
DEPRECATED RDW RBC AUTO: 43.7 FL (ref 37–54)
ERYTHROCYTE [DISTWIDTH] IN BLOOD BY AUTOMATED COUNT: 13 % (ref 12.3–15.4)
HCT VFR BLD AUTO: 46 % (ref 34–46.6)
HGB BLD-MCNC: 15 G/DL (ref 12–15.9)
MCH RBC QN AUTO: 29.8 PG (ref 26.6–33)
MCHC RBC AUTO-ENTMCNC: 32.6 G/DL (ref 31.5–35.7)
MCV RBC AUTO: 91.3 FL (ref 79–97)
PLATELET # BLD AUTO: 352 10*3/MM3 (ref 140–450)
PMV BLD AUTO: 9.5 FL (ref 6–12)
RBC # BLD AUTO: 5.04 10*6/MM3 (ref 3.77–5.28)
VIT B12 BLD-MCNC: 698 PG/ML (ref 211–946)
WBC NRBC COR # BLD AUTO: 7.44 10*3/MM3 (ref 3.4–10.8)

## 2024-12-18 PROCEDURE — 82607 VITAMIN B-12: CPT | Performed by: NURSE PRACTITIONER

## 2024-12-18 PROCEDURE — 36415 COLL VENOUS BLD VENIPUNCTURE: CPT | Performed by: NURSE PRACTITIONER

## 2024-12-18 PROCEDURE — 82306 VITAMIN D 25 HYDROXY: CPT | Performed by: NURSE PRACTITIONER

## 2024-12-18 PROCEDURE — 80061 LIPID PANEL: CPT | Performed by: NURSE PRACTITIONER

## 2024-12-18 PROCEDURE — 80050 GENERAL HEALTH PANEL: CPT | Performed by: NURSE PRACTITIONER

## 2024-12-19 LAB
ALBUMIN SERPL-MCNC: 4.3 G/DL (ref 3.5–5.2)
ALBUMIN/GLOB SERPL: 1.3 G/DL
ALP SERPL-CCNC: 114 U/L (ref 39–117)
ALT SERPL W P-5'-P-CCNC: 16 U/L (ref 1–33)
ANION GAP SERPL CALCULATED.3IONS-SCNC: 12 MMOL/L (ref 5–15)
AST SERPL-CCNC: 23 U/L (ref 1–32)
BILIRUB SERPL-MCNC: 0.3 MG/DL (ref 0–1.2)
BUN SERPL-MCNC: 18 MG/DL (ref 6–20)
BUN/CREAT SERPL: 22.5 (ref 7–25)
CALCIUM SPEC-SCNC: 9.5 MG/DL (ref 8.6–10.5)
CHLORIDE SERPL-SCNC: 108 MMOL/L (ref 98–107)
CHOLEST SERPL-MCNC: 161 MG/DL (ref 0–200)
CO2 SERPL-SCNC: 22 MMOL/L (ref 22–29)
CREAT SERPL-MCNC: 0.8 MG/DL (ref 0.57–1)
EGFRCR SERPLBLD CKD-EPI 2021: 86.6 ML/MIN/1.73
GLOBULIN UR ELPH-MCNC: 3.3 GM/DL
GLUCOSE SERPL-MCNC: 82 MG/DL (ref 65–99)
HDLC SERPL-MCNC: 45 MG/DL (ref 40–60)
LDLC SERPL CALC-MCNC: 91 MG/DL (ref 0–100)
LDLC/HDLC SERPL: 1.94 {RATIO}
POTASSIUM SERPL-SCNC: 3.9 MMOL/L (ref 3.5–5.2)
PROT SERPL-MCNC: 7.6 G/DL (ref 6–8.5)
SODIUM SERPL-SCNC: 142 MMOL/L (ref 136–145)
TRIGL SERPL-MCNC: 144 MG/DL (ref 0–150)
TSH SERPL DL<=0.05 MIU/L-ACNC: 4.15 UIU/ML (ref 0.27–4.2)
VLDLC SERPL-MCNC: 25 MG/DL (ref 5–40)

## 2024-12-23 ENCOUNTER — TRANSCRIBE ORDERS (OUTPATIENT)
Dept: INTERNAL MEDICINE | Facility: CLINIC | Age: 54
End: 2024-12-23
Payer: COMMERCIAL

## 2024-12-23 DIAGNOSIS — Z12.31 VISIT FOR SCREENING MAMMOGRAM: Primary | ICD-10-CM

## 2025-01-24 ENCOUNTER — HOSPITAL ENCOUNTER (OUTPATIENT)
Dept: MAMMOGRAPHY | Facility: HOSPITAL | Age: 55
Discharge: HOME | End: 2025-01-24
Admitting: NURSE PRACTITIONER
Payer: COMMERCIAL

## 2025-01-24 DIAGNOSIS — Z12.31 VISIT FOR SCREENING MAMMOGRAM: ICD-10-CM

## 2025-01-24 PROCEDURE — 77067 SCR MAMMO BI INCL CAD: CPT

## 2025-01-24 PROCEDURE — 77063 BREAST TOMOSYNTHESIS BI: CPT

## 2025-02-12 RX ORDER — SODIUM, POTASSIUM,MAG SULFATES 17.5-3.13G
SOLUTION, RECONSTITUTED, ORAL ORAL
Qty: 354 ML | Refills: 0 | Status: SHIPPED | OUTPATIENT
Start: 2025-02-12

## 2025-02-24 ENCOUNTER — OUTSIDE FACILITY SERVICE (OUTPATIENT)
Dept: GASTROENTEROLOGY | Facility: CLINIC | Age: 55
End: 2025-02-24
Payer: COMMERCIAL

## 2025-02-24 PROCEDURE — 88305 TISSUE EXAM BY PATHOLOGIST: CPT | Performed by: INTERNAL MEDICINE

## 2025-02-24 PROCEDURE — 45385 COLONOSCOPY W/LESION REMOVAL: CPT | Performed by: INTERNAL MEDICINE

## 2025-02-25 ENCOUNTER — LAB REQUISITION (OUTPATIENT)
Dept: LAB | Facility: HOSPITAL | Age: 55
End: 2025-02-25
Payer: COMMERCIAL

## 2025-02-25 DIAGNOSIS — Z86.0100 PERSONAL HISTORY OF COLON POLYPS, UNSPECIFIED: ICD-10-CM

## 2025-03-26 ENCOUNTER — OFFICE VISIT (OUTPATIENT)
Dept: ENDOCRINOLOGY | Facility: CLINIC | Age: 55
End: 2025-03-26
Payer: COMMERCIAL

## 2025-03-26 VITALS
HEART RATE: 88 BPM | WEIGHT: 176 LBS | BODY MASS INDEX: 29.32 KG/M2 | HEIGHT: 65 IN | DIASTOLIC BLOOD PRESSURE: 72 MMHG | SYSTOLIC BLOOD PRESSURE: 120 MMHG | OXYGEN SATURATION: 96 %

## 2025-03-26 DIAGNOSIS — C73 PAPILLARY CARCINOMA OF THYROID: ICD-10-CM

## 2025-03-26 DIAGNOSIS — E89.0 POSTOPERATIVE HYPOTHYROIDISM: Primary | ICD-10-CM

## 2025-03-26 PROCEDURE — 84432 ASSAY OF THYROGLOBULIN: CPT | Performed by: INTERNAL MEDICINE

## 2025-03-26 PROCEDURE — 86800 THYROGLOBULIN ANTIBODY: CPT | Performed by: INTERNAL MEDICINE

## 2025-03-26 PROCEDURE — 84439 ASSAY OF FREE THYROXINE: CPT | Performed by: INTERNAL MEDICINE

## 2025-03-26 PROCEDURE — 99214 OFFICE O/P EST MOD 30 MIN: CPT | Performed by: INTERNAL MEDICINE

## 2025-03-26 PROCEDURE — 76536 US EXAM OF HEAD AND NECK: CPT | Performed by: INTERNAL MEDICINE

## 2025-03-26 PROCEDURE — 84443 ASSAY THYROID STIM HORMONE: CPT | Performed by: INTERNAL MEDICINE

## 2025-03-26 NOTE — PROGRESS NOTES
Chief complaint  Thyroid Problem (Postoperative hypothyroidism/)    Subjective   Joyce Orona is a 54 y.o. female is here today for follow-up.  Follow-up for thyroid cancer.   S/p thyroidectomy 2007 and I - 131 was administered afterwards. She had lab work to follow the cancer and the work -up was negative.   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 T1 Nx Mx.  In July 2007 patient received 100.4 millicuries of radioactive iodine and uptake in the thyroid bed was seen on posttreatment scan.   In July 2008 patient had repeat whole body scan and recurrent activity in the thyroid bed was seen, for which patient received additional therapy.  Thyroglobulin is undetectable since then and she remained on suppression therapy, She has been followed with yearly u/s and TG evaluation.     Vit D deficency was dx on labs and she was given supplements    No complaints.    Medications    Current Outpatient Medications:     ibuprofen (ADVIL,MOTRIN) 800 MG tablet, Take 1 tablet by mouth Every 6 (Six) Hours As Needed for Moderate Pain., Disp: 90 tablet, Rfl: 2    lansoprazole (Prevacid) 30 MG capsule, Take 1 capsule by mouth 2 (Two) Times a Day., Disp: 180 capsule, Rfl: 3    levothyroxine (Synthroid) 125 MCG tablet, Take 1 tablet by mouth Daily., Disp: 30 tablet, Rfl: 8    Cholecalciferol 25 MCG (1000 UT) tablet, Take 1 tablet by mouth Daily., Disp: , Rfl:     sodium-potassium-magnesium sulfates (Suprep Bowel Prep Kit) 17.5-3.13-1.6 GM/177ML solution oral solution, Use as directed by provider for colonoscopy prep, Disp: 354 mL, Rfl: 0    Review of systems  Review of Systems   Constitutional:  Positive for fatigue and unexpected weight change (weight gain). Negative for chills, diaphoresis and fever.   HENT:  Negative for congestion and sore throat.    Respiratory:  Negative for cough.    Cardiovascular:  Negative for chest pain.  "  Gastrointestinal:  Negative for abdominal pain, nausea and vomiting.   Genitourinary:  Negative for dysuria.   Musculoskeletal:  Negative for myalgias and neck pain.   Skin:  Negative for rash.   Neurological:  Negative for weakness, numbness and headaches.   All other systems reviewed and are negative.      Physical exam  Objective   /72 (BP Location: Left arm, Patient Position: Sitting, Cuff Size: Adult)   Pulse 88   Ht 165.1 cm (65\")   Wt 79.8 kg (176 lb)   LMP 2016   SpO2 96%   BMI 29.29 kg/m²   Physical Exam   Constitutional: She is oriented to person, place, and time. She appears well-developed.   HENT:   Head: Normocephalic and atraumatic.   Eyes: Conjunctivae are normal.   Neck: No thyromegaly present.   The neck is supple and symmetric   Cardiovascular: Normal rate, regular rhythm and normal heart sounds.   Pulmonary/Chest: Effort normal and breath sounds normal.   Lymphadenopathy:     She has no cervical adenopathy.   Neurological: She is alert and oriented to person, place, and time. She has normal reflexes.   Skin: Skin is warm and dry. No rash noted.   Psychiatric: Thought content normal.   Vitals reviewed.        LABS AND IMAGING    Post-operative Neck Ultrasound  [facility]    PT: Joyce Orona  : 1970  Date:  24   Indication: History of thyroid cancer.    Technique: Real time high resolution imaging of the anterior neck was performed in longitudinal and transverse planes.    Findings:   Compartment / Level I  (Submandibular): no abnormal lymph nodules bilaterally  Compartment / Level II  (Suprahyoid parajugular): left level IIB has 0.3 x 1.13 cm benign appearing lymph node.   Compartment / Level III  (Infrahyoid supracricoid parajugular): no abnormal lymph nodules bilaterally  Compartment / Level IV (Infracricoid parajugular): no abnormal lymph nodules bilaterally  Compartment / Level V (Posterior cervical triangle): no abnormal lymph " nodules bilaterally  Compartment / Level VI (Central compartment): no abnormal lymph nodules identified  Compartment / Level VII (Substernal space): no abnormal lymph nodules bilaterally    Impression: The structures of the neck are shifted medially as expected post thyroidectomy. The thyroid bed is unremarkable.   No abnormal lymph nodes were identified in surgical anatomic compartments I - VII.      Recommendation: Repeat Neck US recommended as clinically indicated.      Signed: Kimberly Vance MD            Assessment  Assessment & Plan   Problems Addressed this Visit          Endocrine and Metabolic    Hypothyroidism - Primary (Chronic)    Relevant Orders    US Thyroid (Completed)       Hematology and Neoplasia    Papillary carcinoma of thyroid (Chronic)    Relevant Orders    T4, Free    TSH    Thyroglobulin Antibody and Thyroglobulin, MAHNAZ or LC/MS-MS     Diagnoses         Codes Comments      Postoperative hypothyroidism    -  Primary ICD-10-CM: E89.0  ICD-9-CM: 244.0       Papillary carcinoma of thyroid     ICD-10-CM: C73  ICD-9-CM: 193             Plan  No evidence of thyroid cancer by current evaluation.   Check TG and thyroid function today.   U/s showed no evidence of recurrence. Benign appearing small l/n left level II B. Will continue to monitor every 2 years - due in 2027.   Continue levothyroxine 125 mcg and will adjust the dose after lab review. Her last TSH was 4.1 - suboptimal for thyroid cancer tx.     Follow-up in 1 year.

## 2025-03-27 LAB
T4 FREE SERPL-MCNC: 1.77 NG/DL (ref 0.92–1.68)
TSH SERPL DL<=0.05 MIU/L-ACNC: 0.93 UIU/ML (ref 0.27–4.2)

## 2025-03-28 LAB
THYROGLOB AB SERPL-ACNC: <1 IU/ML (ref 0–0.9)
THYROGLOB SERPL-MCNC: <0.1 NG/ML (ref 1.5–38.5)

## 2025-04-04 RX ORDER — LEVOTHYROXINE SODIUM 125 MCG
125 TABLET ORAL DAILY
Qty: 30 TABLET | Refills: 11 | Status: SHIPPED | OUTPATIENT
Start: 2025-04-04

## 2025-04-06 DIAGNOSIS — K21.9 GASTROESOPHAGEAL REFLUX DISEASE WITHOUT ESOPHAGITIS: ICD-10-CM

## 2025-04-08 RX ORDER — LANSOPRAZOLE 30 MG/1
30 CAPSULE, DELAYED RELEASE ORAL 2 TIMES DAILY
Qty: 180 CAPSULE | Refills: 3 | Status: SHIPPED | OUTPATIENT
Start: 2025-04-08

## (undated) DEVICE — LAPAROSCOPIC SMOKE FILTRATION SYSTEM: Brand: PALL LAPAROSHIELD® PLUS LAPAROSCOPIC SMOKE FILTRATION SYSTEM

## (undated) DEVICE — DRAPE,UTILITY,TAPE,15X26,STERILE: Brand: MEDLINE

## (undated) DEVICE — ENDOPATH XCEL UNIVERSAL TROCAR STABLILITY SLEEVES: Brand: ENDOPATH XCEL

## (undated) DEVICE — PATIENT RETURN ELECTRODE, SINGLE-USE, CONTACT QUALITY MONITORING, ADULT, WITH 9FT CORD, FOR PATIENTS WEIGING OVER 33LBS. (15KG): Brand: MEGADYNE

## (undated) DEVICE — PK LAP LASR CHOLE 10

## (undated) DEVICE — ENDOPATH XCEL BLADELESS TROCARS WITH STABILITY SLEEVES: Brand: ENDOPATH XCEL

## (undated) DEVICE — ANTIBACTERIAL UNDYED BRAIDED (POLYGLACTIN 910), SYNTHETIC ABSORBABLE SURGICAL SUTURE: Brand: COATED VICRYL

## (undated) DEVICE — APPL CHLORAPREP TINTED 26ML TEAL

## (undated) DEVICE — INTENDED FOR TISSUE SEPARATION, AND OTHER PROCEDURES THAT REQUIRE A SHARP SURGICAL BLADE TO PUNCTURE OR CUT.: Brand: BARD-PARKER ® STAINLESS STEEL BLADES

## (undated) DEVICE — ENDOPATH PNEUMONEEDLE INSUFFLATION NEEDLES WITH LUER LOCK CONNECTORS 120MM: Brand: ENDOPATH

## (undated) DEVICE — GLV SURG BIOGEL LTX PF 7

## (undated) DEVICE — SUT MNCRYL PLS ANTIB UD 4/0 PS2 18IN

## (undated) DEVICE — UNDRGLV SURG BIOGEL PUNCTUREINDICATION SZ7 PF STRL

## (undated) DEVICE — REPOSABLE SCISSORS: Brand: DIRECT DRIVE REPOSABLE SCISSORS

## (undated) DEVICE — ADHS SKIN PREMIERPRO EXOFIN TOPICAL HI/VISC .5ML

## (undated) DEVICE — SUT VIC 0 UR6 27IN VCP603H

## (undated) DEVICE — ENDOPOUCH RETRIEVER SPECIMEN RETRIEVAL BAGS: Brand: ENDOPOUCH RETRIEVER

## (undated) DEVICE — BLANKT WARM UPPR/BDY ARM/OUT 57X196CM

## (undated) DEVICE — LAPAROVUE VISIBILITY SYSTEM LAPAROSCOPIC SOLUTIONS: Brand: LAPAROVUE

## (undated) DEVICE — [HIGH FLOW INSUFFLATOR,  DO NOT USE IF PACKAGE IS DAMAGED,  KEEP DRY,  KEEP AWAY FROM SUNLIGHT,  PROTECT FROM HEAT AND RADIOACTIVE SOURCES.]: Brand: PNEUMOSURE

## (undated) DEVICE — SYR LL TP 10ML STRL